# Patient Record
Sex: FEMALE | Race: WHITE | Employment: FULL TIME | URBAN - METROPOLITAN AREA
[De-identification: names, ages, dates, MRNs, and addresses within clinical notes are randomized per-mention and may not be internally consistent; named-entity substitution may affect disease eponyms.]

---

## 2017-01-03 ENCOUNTER — HOSPITAL ENCOUNTER (OUTPATIENT)
Dept: PHYSICAL THERAPY | Age: 57
Discharge: HOME | End: 2017-01-03
Admitting: PHYSICIAN ASSISTANT

## 2017-01-09 ENCOUNTER — OFFICE VISIT (OUTPATIENT)
Dept: ORTHOPEDIC SURGERY | Age: 57
End: 2017-01-09

## 2017-01-09 VITALS
WEIGHT: 205 LBS | SYSTOLIC BLOOD PRESSURE: 117 MMHG | BODY MASS INDEX: 27.77 KG/M2 | TEMPERATURE: 96.7 F | DIASTOLIC BLOOD PRESSURE: 76 MMHG | HEIGHT: 72 IN | HEART RATE: 82 BPM

## 2017-01-09 DIAGNOSIS — M70.62 TROCHANTERIC BURSITIS OF LEFT HIP: Primary | ICD-10-CM

## 2017-01-09 DIAGNOSIS — M17.0 PRIMARY OSTEOARTHRITIS OF BOTH KNEES: ICD-10-CM

## 2017-01-09 PROCEDURE — 20610 DRAIN/INJ JOINT/BURSA W/O US: CPT | Performed by: PHYSICIAN ASSISTANT

## 2017-01-09 PROCEDURE — 99213 OFFICE O/P EST LOW 20 MIN: CPT | Performed by: PHYSICIAN ASSISTANT

## 2017-01-10 ENCOUNTER — HOSPITAL ENCOUNTER (OUTPATIENT)
Dept: PHYSICAL THERAPY | Age: 57
Discharge: HOME | End: 2017-01-10
Admitting: PHYSICIAN ASSISTANT

## 2017-01-11 ENCOUNTER — OFFICE VISIT (OUTPATIENT)
Dept: PRIMARY CARE CLINIC | Age: 57
End: 2017-01-11

## 2017-01-11 VITALS
TEMPERATURE: 98 F | HEART RATE: 81 BPM | WEIGHT: 199 LBS | BODY MASS INDEX: 26.99 KG/M2 | SYSTOLIC BLOOD PRESSURE: 132 MMHG | RESPIRATION RATE: 18 BRPM | OXYGEN SATURATION: 98 % | DIASTOLIC BLOOD PRESSURE: 84 MMHG

## 2017-01-11 DIAGNOSIS — Z83.3 FAMILY HISTORY OF TYPE 2 DIABETES MELLITUS IN FATHER: ICD-10-CM

## 2017-01-11 DIAGNOSIS — Z23 NEED FOR STREPTOCOCCUS PNEUMONIAE VACCINATION: ICD-10-CM

## 2017-01-11 DIAGNOSIS — Z76.89 ENCOUNTER TO ESTABLISH CARE: ICD-10-CM

## 2017-01-11 DIAGNOSIS — C44.90 SKIN CANCER: ICD-10-CM

## 2017-01-11 DIAGNOSIS — E78.00 HYPERCHOLESTEROLEMIA: ICD-10-CM

## 2017-01-11 DIAGNOSIS — G56.03 BILATERAL CARPAL TUNNEL SYNDROME: ICD-10-CM

## 2017-01-11 DIAGNOSIS — M54.32 BILATERAL SCIATICA: Primary | ICD-10-CM

## 2017-01-11 DIAGNOSIS — K21.9 GASTROESOPHAGEAL REFLUX DISEASE WITHOUT ESOPHAGITIS: ICD-10-CM

## 2017-01-11 DIAGNOSIS — M54.31 BILATERAL SCIATICA: Primary | ICD-10-CM

## 2017-01-11 DIAGNOSIS — Z11.59 NEED FOR HEPATITIS C SCREENING TEST: ICD-10-CM

## 2017-01-11 DIAGNOSIS — M81.0 OSTEOPOROSIS: ICD-10-CM

## 2017-01-11 DIAGNOSIS — M79.672 PAIN IN BOTH FEET: ICD-10-CM

## 2017-01-11 DIAGNOSIS — M79.671 PAIN IN BOTH FEET: ICD-10-CM

## 2017-01-11 DIAGNOSIS — E89.0 H/O PARTIAL THYROIDECTOMY: ICD-10-CM

## 2017-01-11 PROCEDURE — 99215 OFFICE O/P EST HI 40 MIN: CPT | Performed by: INTERNAL MEDICINE

## 2017-01-11 PROCEDURE — 90471 IMMUNIZATION ADMIN: CPT | Performed by: INTERNAL MEDICINE

## 2017-01-11 PROCEDURE — 90732 PPSV23 VACC 2 YRS+ SUBQ/IM: CPT | Performed by: INTERNAL MEDICINE

## 2017-01-11 RX ORDER — PRAVASTATIN SODIUM 40 MG
TABLET ORAL
Qty: 90 TABLET | Refills: 3 | Status: SHIPPED | OUTPATIENT
Start: 2017-01-11 | End: 2017-01-20

## 2017-01-11 RX ORDER — ALENDRONATE SODIUM 70 MG/1
TABLET ORAL
Qty: 12 TABLET | Refills: 3 | Status: SHIPPED | OUTPATIENT
Start: 2017-01-11 | End: 2017-01-20

## 2017-01-11 RX ORDER — RANITIDINE 150 MG/1
150 TABLET ORAL NIGHTLY
Qty: 180 TABLET | Refills: 3 | Status: SHIPPED | OUTPATIENT
Start: 2017-01-11 | End: 2017-04-06

## 2017-01-11 ASSESSMENT — ENCOUNTER SYMPTOMS
SINUS PRESSURE: 1
PHOTOPHOBIA: 0
NAUSEA: 1
VOMITING: 0
ABDOMINAL PAIN: 0
CONSTIPATION: 1
VOICE CHANGE: 0
SORE THROAT: 0
EYE ITCHING: 0
EYE DISCHARGE: 0
ANAL BLEEDING: 0
RHINORRHEA: 1
STRIDOR: 0
APNEA: 1
WHEEZING: 0
BACK PAIN: 1
BLOOD IN STOOL: 0
CHOKING: 1
ABDOMINAL DISTENTION: 0
TROUBLE SWALLOWING: 0
COUGH: 0
RECTAL PAIN: 0
CHEST TIGHTNESS: 0
EYE REDNESS: 0
EYE PAIN: 0
DIARRHEA: 0

## 2017-01-12 LAB
A/G RATIO: 1.6 (ref 1.1–2.2)
ALBUMIN SERPL-MCNC: 4.1 G/DL (ref 3.4–5)
ALP BLD-CCNC: 91 U/L (ref 40–129)
ALT SERPL-CCNC: 17 U/L (ref 10–40)
ANION GAP SERPL CALCULATED.3IONS-SCNC: 14 MMOL/L (ref 3–16)
AST SERPL-CCNC: 15 U/L (ref 15–37)
BASOPHILS ABSOLUTE: 0 K/UL (ref 0–0.2)
BASOPHILS RELATIVE PERCENT: 0.5 %
BILIRUB SERPL-MCNC: 0.4 MG/DL (ref 0–1)
BILIRUBIN URINE: NEGATIVE
BLOOD, URINE: NEGATIVE
BUN BLDV-MCNC: 30 MG/DL (ref 7–20)
CALCIUM SERPL-MCNC: 9.3 MG/DL (ref 8.3–10.6)
CHLORIDE BLD-SCNC: 104 MMOL/L (ref 99–110)
CHOLESTEROL, TOTAL: 171 MG/DL (ref 0–199)
CLARITY: CLEAR
CO2: 26 MMOL/L (ref 21–32)
COLOR: YELLOW
COMMENT UA: ABNORMAL
CREAT SERPL-MCNC: 0.8 MG/DL (ref 0.6–1.1)
EOSINOPHILS ABSOLUTE: 0 K/UL (ref 0–0.6)
EOSINOPHILS RELATIVE PERCENT: 0.6 %
EPITHELIAL CELLS, UA: 5 /HPF (ref 0–5)
GFR AFRICAN AMERICAN: >60
GFR NON-AFRICAN AMERICAN: >60
GLOBULIN: 2.6 G/DL
GLUCOSE BLD-MCNC: 89 MG/DL (ref 70–99)
GLUCOSE URINE: NEGATIVE MG/DL
HCT VFR BLD CALC: 41.8 % (ref 36–48)
HDLC SERPL-MCNC: 73 MG/DL (ref 40–60)
HEMOGLOBIN: 13.6 G/DL (ref 12–16)
HEPATITIS C ANTIBODY INTERPRETATION: NORMAL
HYALINE CASTS: 6 /HPF (ref 0–8)
KETONES, URINE: NEGATIVE MG/DL
LDL CHOLESTEROL CALCULATED: 85 MG/DL
LEUKOCYTE ESTERASE, URINE: ABNORMAL
LYMPHOCYTES ABSOLUTE: 2.1 K/UL (ref 1–5.1)
LYMPHOCYTES RELATIVE PERCENT: 26.4 %
MCH RBC QN AUTO: 31.2 PG (ref 26–34)
MCHC RBC AUTO-ENTMCNC: 32.6 G/DL (ref 31–36)
MCV RBC AUTO: 95.6 FL (ref 80–100)
MICROSCOPIC EXAMINATION: YES
MONOCYTES ABSOLUTE: 0.5 K/UL (ref 0–1.3)
MONOCYTES RELATIVE PERCENT: 6.1 %
NEUTROPHILS ABSOLUTE: 5.3 K/UL (ref 1.7–7.7)
NEUTROPHILS RELATIVE PERCENT: 66.4 %
NITRITE, URINE: NEGATIVE
PDW BLD-RTO: 15.5 % (ref 12.4–15.4)
PH UA: 7
PLATELET # BLD: 346 K/UL (ref 135–450)
PMV BLD AUTO: 8.3 FL (ref 5–10.5)
POTASSIUM SERPL-SCNC: 4.9 MMOL/L (ref 3.5–5.1)
PROTEIN UA: NEGATIVE MG/DL
RBC # BLD: 4.37 M/UL (ref 4–5.2)
RBC UA: 6 /HPF (ref 0–4)
SODIUM BLD-SCNC: 144 MMOL/L (ref 136–145)
SPECIFIC GRAVITY UA: 1.02
TOTAL PROTEIN: 6.7 G/DL (ref 6.4–8.2)
TRIGL SERPL-MCNC: 66 MG/DL (ref 0–150)
TSH REFLEX FT4: 2.42 UIU/ML (ref 0.27–4.2)
URINE TYPE: ABNORMAL
UROBILINOGEN, URINE: 0.2 E.U./DL
VITAMIN B-12: 790 PG/ML (ref 211–911)
VITAMIN D 25-HYDROXY: 20.3 NG/ML
VLDLC SERPL CALC-MCNC: 13 MG/DL
WBC # BLD: 8 K/UL (ref 4–11)
WBC UA: 11 /HPF (ref 0–5)

## 2017-01-13 DIAGNOSIS — E55.9 VITAMIN D DEFICIENCY: Primary | ICD-10-CM

## 2017-01-13 LAB
ESTIMATED AVERAGE GLUCOSE: 105.4 MG/DL
HBA1C MFR BLD: 5.3 %

## 2017-01-13 RX ORDER — ERGOCALCIFEROL 1.25 MG/1
50000 CAPSULE ORAL WEEKLY
Qty: 4 CAPSULE | Refills: 5 | Status: SHIPPED | OUTPATIENT
Start: 2017-01-13 | End: 2017-01-14 | Stop reason: SDUPTHER

## 2017-01-14 DIAGNOSIS — E55.9 VITAMIN D DEFICIENCY: ICD-10-CM

## 2017-01-14 DIAGNOSIS — M79.672 PAIN IN BOTH FEET: ICD-10-CM

## 2017-01-14 DIAGNOSIS — R31.29 MICROSCOPIC HEMATURIA: ICD-10-CM

## 2017-01-14 DIAGNOSIS — M79.671 PAIN IN BOTH FEET: ICD-10-CM

## 2017-01-14 DIAGNOSIS — R82.81 PYURIA: Primary | ICD-10-CM

## 2017-01-14 RX ORDER — ERGOCALCIFEROL 1.25 MG/1
50000 CAPSULE ORAL WEEKLY
Qty: 4 CAPSULE | Refills: 5 | Status: SHIPPED | OUTPATIENT
Start: 2017-01-14 | End: 2017-02-08 | Stop reason: SDUPTHER

## 2017-01-14 RX ORDER — GABAPENTIN 300 MG/1
300 CAPSULE ORAL 3 TIMES DAILY
Qty: 90 CAPSULE | Refills: 3 | Status: SHIPPED | OUTPATIENT
Start: 2017-01-14 | End: 2017-01-20

## 2017-01-17 ENCOUNTER — PROCEDURE VISIT (OUTPATIENT)
Dept: NEUROLOGY | Age: 57
End: 2017-01-17

## 2017-01-17 DIAGNOSIS — R20.2 NUMBNESS AND TINGLING: Primary | ICD-10-CM

## 2017-01-17 DIAGNOSIS — R20.0 NUMBNESS AND TINGLING: Primary | ICD-10-CM

## 2017-01-17 PROCEDURE — 95909 NRV CNDJ TST 5-6 STUDIES: CPT | Performed by: PSYCHIATRY & NEUROLOGY

## 2017-01-17 PROCEDURE — 95886 MUSC TEST DONE W/N TEST COMP: CPT | Performed by: PSYCHIATRY & NEUROLOGY

## 2017-01-18 ENCOUNTER — HOSPITAL ENCOUNTER (OUTPATIENT)
Dept: PHYSICAL THERAPY | Age: 57
Discharge: HOME | End: 2017-01-18
Admitting: PHYSICIAN ASSISTANT

## 2017-01-20 ENCOUNTER — TELEPHONE (OUTPATIENT)
Dept: PRIMARY CARE CLINIC | Age: 57
End: 2017-01-20

## 2017-01-20 ENCOUNTER — HOSPITAL ENCOUNTER (OUTPATIENT)
Dept: CT IMAGING | Age: 57
Discharge: HOME | End: 2017-01-20
Attending: INTERNAL MEDICINE | Admitting: INTERNAL MEDICINE

## 2017-01-20 DIAGNOSIS — M79.605 PAIN IN BOTH LOWER EXTREMITIES: Primary | ICD-10-CM

## 2017-01-20 DIAGNOSIS — M79.671 PAIN IN BOTH FEET: ICD-10-CM

## 2017-01-20 DIAGNOSIS — M79.604 PAIN IN BOTH LOWER EXTREMITIES: Primary | ICD-10-CM

## 2017-01-20 DIAGNOSIS — R31.29 OTHER MICROSCOPIC HEMATURIA: ICD-10-CM

## 2017-01-20 DIAGNOSIS — R31.29 MICROSCOPIC HEMATURIA: ICD-10-CM

## 2017-01-20 DIAGNOSIS — R52 PAIN: ICD-10-CM

## 2017-01-20 DIAGNOSIS — M54.31 BILATERAL SCIATICA: ICD-10-CM

## 2017-01-20 DIAGNOSIS — E78.00 HYPERCHOLESTEROLEMIA: ICD-10-CM

## 2017-01-20 DIAGNOSIS — M54.32 BILATERAL SCIATICA: ICD-10-CM

## 2017-01-20 DIAGNOSIS — M79.672 PAIN IN BOTH FEET: ICD-10-CM

## 2017-01-20 LAB
C-REACTIVE PROTEIN: 0.7 MG/L (ref 0–5.1)
SEDIMENTATION RATE, ERYTHROCYTE: 30 MM/HR (ref 0–30)

## 2017-01-20 RX ORDER — LIDOCAINE 50 MG/G
1 PATCH TOPICAL DAILY
Qty: 30 PATCH | Refills: 2 | Status: SHIPPED | OUTPATIENT
Start: 2017-01-20 | End: 2017-02-08

## 2017-01-21 LAB — URINE CULTURE, ROUTINE: NORMAL

## 2017-01-24 ENCOUNTER — OFFICE VISIT (OUTPATIENT)
Dept: RHEUMATOLOGY | Age: 57
End: 2017-01-24

## 2017-01-24 VITALS
DIASTOLIC BLOOD PRESSURE: 82 MMHG | HEART RATE: 66 BPM | BODY MASS INDEX: 27.12 KG/M2 | WEIGHT: 200 LBS | SYSTOLIC BLOOD PRESSURE: 124 MMHG

## 2017-01-24 DIAGNOSIS — M79.7 FIBROMYALGIA: Primary | ICD-10-CM

## 2017-01-24 DIAGNOSIS — Z79.899 ENCOUNTER FOR LONG-TERM (CURRENT) USE OF MEDICATIONS: ICD-10-CM

## 2017-01-24 LAB
ALBUMIN SERPL-MCNC: 4.3 G/DL (ref 3.4–5)
ALP BLD-CCNC: 94 U/L (ref 40–129)
ALT SERPL-CCNC: 20 U/L (ref 10–40)
AST SERPL-CCNC: 19 U/L (ref 15–37)
BILIRUB SERPL-MCNC: 0.5 MG/DL (ref 0–1)
BILIRUBIN DIRECT: <0.2 MG/DL (ref 0–0.3)
BILIRUBIN, INDIRECT: NORMAL MG/DL (ref 0–1)
CREAT SERPL-MCNC: 0.8 MG/DL (ref 0.6–1.1)
GFR AFRICAN AMERICAN: >60
GFR NON-AFRICAN AMERICAN: >60
TOTAL PROTEIN: 6.9 G/DL (ref 6.4–8.2)

## 2017-01-24 PROCEDURE — 99213 OFFICE O/P EST LOW 20 MIN: CPT | Performed by: INTERNAL MEDICINE

## 2017-01-24 RX ORDER — VITAMIN B COMPLEX
1 CAPSULE ORAL DAILY
COMMUNITY

## 2017-01-24 RX ORDER — DULOXETIN HYDROCHLORIDE 30 MG/1
30 CAPSULE, DELAYED RELEASE ORAL DAILY
Qty: 30 CAPSULE | Refills: 2 | Status: SHIPPED | OUTPATIENT
Start: 2017-01-24 | End: 2017-02-08 | Stop reason: SDUPTHER

## 2017-01-24 RX ORDER — MELOXICAM 15 MG/1
15 TABLET ORAL DAILY
Qty: 30 TABLET | Refills: 3 | Status: SHIPPED | OUTPATIENT
Start: 2017-01-24 | End: 2017-04-06

## 2017-01-24 RX ORDER — DULOXETIN HYDROCHLORIDE 30 MG/1
30 CAPSULE, DELAYED RELEASE ORAL DAILY
COMMUNITY
End: 2017-01-24 | Stop reason: SDUPTHER

## 2017-01-31 ENCOUNTER — HOSPITAL ENCOUNTER (OUTPATIENT)
Dept: PHYSICAL THERAPY | Age: 57
Discharge: HOME | End: 2017-01-31
Admitting: PHYSICIAN ASSISTANT

## 2017-02-07 DIAGNOSIS — R31.29 MICROSCOPIC HEMATURIA: Primary | ICD-10-CM

## 2017-02-08 ENCOUNTER — OFFICE VISIT (OUTPATIENT)
Dept: PRIMARY CARE CLINIC | Age: 57
End: 2017-02-08

## 2017-02-08 VITALS
DIASTOLIC BLOOD PRESSURE: 85 MMHG | BODY MASS INDEX: 26.58 KG/M2 | SYSTOLIC BLOOD PRESSURE: 123 MMHG | TEMPERATURE: 97.5 F | HEART RATE: 77 BPM | WEIGHT: 196 LBS

## 2017-02-08 DIAGNOSIS — I73.00 RAYNAUD'S DISEASE WITHOUT GANGRENE: Primary | ICD-10-CM

## 2017-02-08 DIAGNOSIS — M79.7 FIBROMYALGIA: ICD-10-CM

## 2017-02-08 DIAGNOSIS — J02.9 PHARYNGITIS, UNSPECIFIED ETIOLOGY: ICD-10-CM

## 2017-02-08 DIAGNOSIS — E78.00 HYPERCHOLESTEROLEMIA: ICD-10-CM

## 2017-02-08 DIAGNOSIS — Z79.899 ENCOUNTER FOR LONG-TERM (CURRENT) USE OF MEDICATIONS: ICD-10-CM

## 2017-02-08 DIAGNOSIS — E55.9 VITAMIN D DEFICIENCY: ICD-10-CM

## 2017-02-08 LAB — TOTAL CK: 29 U/L (ref 26–192)

## 2017-02-08 PROCEDURE — 99214 OFFICE O/P EST MOD 30 MIN: CPT | Performed by: INTERNAL MEDICINE

## 2017-02-08 RX ORDER — PRAVASTATIN SODIUM 40 MG
TABLET ORAL
Qty: 90 TABLET | Refills: 3
Start: 2017-02-08 | End: 2018-01-15 | Stop reason: SDUPTHER

## 2017-02-08 RX ORDER — ERGOCALCIFEROL 1.25 MG/1
50000 CAPSULE ORAL WEEKLY
Qty: 4 CAPSULE | Refills: 5 | Status: SHIPPED | OUTPATIENT
Start: 2017-02-08 | End: 2017-12-15 | Stop reason: SDUPTHER

## 2017-02-08 RX ORDER — AZITHROMYCIN 250 MG/1
TABLET, FILM COATED ORAL
Qty: 1 PACKET | Refills: 0 | Status: SHIPPED | OUTPATIENT
Start: 2017-02-08 | End: 2017-02-18

## 2017-02-08 RX ORDER — AMLODIPINE BESYLATE 2.5 MG/1
2.5 TABLET ORAL DAILY
Qty: 30 TABLET | Refills: 3 | Status: SHIPPED | OUTPATIENT
Start: 2017-02-08 | End: 2017-06-07 | Stop reason: SDUPTHER

## 2017-02-08 RX ORDER — DULOXETINE 40 MG/1
30 CAPSULE, DELAYED RELEASE ORAL DAILY
Qty: 30 CAPSULE | Refills: 5 | Status: SHIPPED | OUTPATIENT
Start: 2017-02-08 | End: 2017-03-08

## 2017-02-08 ASSESSMENT — ENCOUNTER SYMPTOMS
EYE PAIN: 0
COUGH: 0
ANAL BLEEDING: 0
TROUBLE SWALLOWING: 0
NAUSEA: 0
ABDOMINAL DISTENTION: 0
SORE THROAT: 1
WHEEZING: 0
EYE ITCHING: 0
RECTAL PAIN: 0
SINUS PRESSURE: 0
STRIDOR: 0
ABDOMINAL PAIN: 0
BLOOD IN STOOL: 0
APNEA: 1
BACK PAIN: 1
DIARRHEA: 0
EYE REDNESS: 0
CHOKING: 0
CONSTIPATION: 0
PHOTOPHOBIA: 0
EYE DISCHARGE: 0
RHINORRHEA: 0
VOMITING: 0
VOICE CHANGE: 0
CHEST TIGHTNESS: 0

## 2017-02-08 ASSESSMENT — PATIENT HEALTH QUESTIONNAIRE - PHQ9
SUM OF ALL RESPONSES TO PHQ9 QUESTIONS 1 & 2: 0
1. LITTLE INTEREST OR PLEASURE IN DOING THINGS: 0
SUM OF ALL RESPONSES TO PHQ QUESTIONS 1-9: 0
2. FEELING DOWN, DEPRESSED OR HOPELESS: 0

## 2017-02-27 ENCOUNTER — OFFICE VISIT (OUTPATIENT)
Dept: PRIMARY CARE CLINIC | Age: 57
End: 2017-02-27

## 2017-02-27 VITALS
OXYGEN SATURATION: 97 % | TEMPERATURE: 97.4 F | BODY MASS INDEX: 26.5 KG/M2 | HEART RATE: 76 BPM | SYSTOLIC BLOOD PRESSURE: 123 MMHG | WEIGHT: 195.4 LBS | DIASTOLIC BLOOD PRESSURE: 81 MMHG

## 2017-02-27 DIAGNOSIS — H92.03 OTALGIA, BILATERAL: Primary | ICD-10-CM

## 2017-02-27 PROCEDURE — 99214 OFFICE O/P EST MOD 30 MIN: CPT | Performed by: FAMILY MEDICINE

## 2017-02-27 RX ORDER — AMOXICILLIN 500 MG/1
TABLET, FILM COATED ORAL
Qty: 30 TABLET | Refills: 0 | Status: SHIPPED | OUTPATIENT
Start: 2017-02-27 | End: 2017-04-06

## 2017-02-27 ASSESSMENT — ENCOUNTER SYMPTOMS
CHOKING: 0
DIARRHEA: 0
SINUS PRESSURE: 0
VOMITING: 0
BLOOD IN STOOL: 0
EYE REDNESS: 0
ABDOMINAL DISTENTION: 0
STRIDOR: 0
COUGH: 0
SHORTNESS OF BREATH: 0
PHOTOPHOBIA: 0
APNEA: 0
WHEEZING: 0
NAUSEA: 0
CONSTIPATION: 0
SORE THROAT: 0
RECTAL PAIN: 0
EYE DISCHARGE: 0
RHINORRHEA: 0
EYE PAIN: 0
EYE ITCHING: 0
COLOR CHANGE: 0
TROUBLE SWALLOWING: 0
CHEST TIGHTNESS: 0
BACK PAIN: 0

## 2017-03-08 ENCOUNTER — OFFICE VISIT (OUTPATIENT)
Dept: PRIMARY CARE CLINIC | Age: 57
End: 2017-03-08

## 2017-03-08 VITALS
RESPIRATION RATE: 18 BRPM | TEMPERATURE: 98.5 F | OXYGEN SATURATION: 94 % | WEIGHT: 194 LBS | DIASTOLIC BLOOD PRESSURE: 72 MMHG | SYSTOLIC BLOOD PRESSURE: 113 MMHG | HEART RATE: 89 BPM | BODY MASS INDEX: 26.31 KG/M2

## 2017-03-08 DIAGNOSIS — M79.7 FIBROMYALGIA: ICD-10-CM

## 2017-03-08 DIAGNOSIS — R49.0 HOARSENESS: ICD-10-CM

## 2017-03-08 DIAGNOSIS — M79.2 NEUROPATHIC PAIN: Primary | ICD-10-CM

## 2017-03-08 DIAGNOSIS — Z79.899 ENCOUNTER FOR LONG-TERM (CURRENT) USE OF MEDICATIONS: ICD-10-CM

## 2017-03-08 DIAGNOSIS — R13.14 PHARYNGOESOPHAGEAL DYSPHAGIA: ICD-10-CM

## 2017-03-08 DIAGNOSIS — B37.0 THRUSH: ICD-10-CM

## 2017-03-08 PROCEDURE — 99214 OFFICE O/P EST MOD 30 MIN: CPT | Performed by: INTERNAL MEDICINE

## 2017-03-08 RX ORDER — DULOXETIN HYDROCHLORIDE 60 MG/1
60 CAPSULE, DELAYED RELEASE ORAL DAILY
Qty: 30 CAPSULE | Refills: 3 | Status: SHIPPED | OUTPATIENT
Start: 2017-03-08 | End: 2017-07-29 | Stop reason: SDUPTHER

## 2017-03-08 ASSESSMENT — ENCOUNTER SYMPTOMS
APNEA: 1
NAUSEA: 0
EYE ITCHING: 0
TROUBLE SWALLOWING: 0
ABDOMINAL DISTENTION: 0
EYE DISCHARGE: 0
VOMITING: 0
VOICE CHANGE: 0
SORE THROAT: 1
CHOKING: 0
STRIDOR: 0
CHEST TIGHTNESS: 0
RECTAL PAIN: 0
PHOTOPHOBIA: 0
SINUS PRESSURE: 0
ANAL BLEEDING: 0
EYE REDNESS: 0
WHEEZING: 0
ABDOMINAL PAIN: 0
COUGH: 0
DIARRHEA: 0
BACK PAIN: 1
CONSTIPATION: 0
BLOOD IN STOOL: 0
EYE PAIN: 0
RHINORRHEA: 0

## 2017-03-08 ASSESSMENT — PATIENT HEALTH QUESTIONNAIRE - PHQ9
2. FEELING DOWN, DEPRESSED OR HOPELESS: 0
1. LITTLE INTEREST OR PLEASURE IN DOING THINGS: 0
SUM OF ALL RESPONSES TO PHQ QUESTIONS 1-9: 0
SUM OF ALL RESPONSES TO PHQ9 QUESTIONS 1 & 2: 0

## 2017-03-20 ENCOUNTER — HOSPITAL ENCOUNTER (OUTPATIENT)
Dept: ULTRASOUND IMAGING | Age: 57
Discharge: HOME | End: 2017-03-20
Attending: INTERNAL MEDICINE | Admitting: INTERNAL MEDICINE

## 2017-03-20 DIAGNOSIS — M79.7 FIBROMYALGIA: ICD-10-CM

## 2017-04-03 ENCOUNTER — INITIAL CONSULT (OUTPATIENT)
Dept: SURGERY | Age: 57
End: 2017-04-03

## 2017-04-03 VITALS
SYSTOLIC BLOOD PRESSURE: 123 MMHG | BODY MASS INDEX: 26.28 KG/M2 | HEIGHT: 72 IN | DIASTOLIC BLOOD PRESSURE: 79 MMHG | WEIGHT: 194 LBS | HEART RATE: 80 BPM

## 2017-04-03 DIAGNOSIS — R10.11 RUQ PAIN: Primary | ICD-10-CM

## 2017-04-03 PROCEDURE — 99204 OFFICE O/P NEW MOD 45 MIN: CPT | Performed by: SURGERY

## 2017-04-03 RX ORDER — PREDNISONE 1 MG/1
2.5 TABLET ORAL DAILY
COMMUNITY
End: 2017-04-13 | Stop reason: ALTCHOICE

## 2017-04-03 ASSESSMENT — ENCOUNTER SYMPTOMS
NAUSEA: 1
ABDOMINAL DISTENTION: 1
ABDOMINAL PAIN: 1

## 2017-04-06 ENCOUNTER — OFFICE VISIT (OUTPATIENT)
Dept: PRIMARY CARE CLINIC | Age: 57
End: 2017-04-06

## 2017-04-06 VITALS
HEART RATE: 77 BPM | OXYGEN SATURATION: 98 % | WEIGHT: 192 LBS | HEIGHT: 72 IN | TEMPERATURE: 98.2 F | BODY MASS INDEX: 26.01 KG/M2 | SYSTOLIC BLOOD PRESSURE: 123 MMHG | DIASTOLIC BLOOD PRESSURE: 80 MMHG | RESPIRATION RATE: 16 BRPM

## 2017-04-06 DIAGNOSIS — R49.0 HOARSENESS: ICD-10-CM

## 2017-04-06 DIAGNOSIS — R10.11 RUQ PAIN: Primary | ICD-10-CM

## 2017-04-06 DIAGNOSIS — R11.0 CHRONIC NAUSEA: ICD-10-CM

## 2017-04-06 DIAGNOSIS — B37.0 THRUSH: ICD-10-CM

## 2017-04-06 PROCEDURE — 99213 OFFICE O/P EST LOW 20 MIN: CPT | Performed by: INTERNAL MEDICINE

## 2017-04-06 RX ORDER — MELOXICAM 15 MG/1
15 TABLET ORAL DAILY
COMMUNITY
Start: 2017-01-24 | End: 2017-04-06

## 2017-04-06 RX ORDER — HYDROCODONE BITARTRATE AND ACETAMINOPHEN 5; 325 MG/1; MG/1
1 TABLET ORAL EVERY 6 HOURS PRN
Qty: 10 TABLET | Refills: 0 | Status: SHIPPED | OUTPATIENT
Start: 2017-04-06 | End: 2017-04-13

## 2017-04-06 RX ORDER — DULOXETIN HYDROCHLORIDE 20 MG/1
60 CAPSULE, DELAYED RELEASE ORAL
COMMUNITY
Start: 2017-02-13 | End: 2017-04-06

## 2017-04-06 RX ORDER — PANTOPRAZOLE SODIUM 40 MG/1
40 TABLET, DELAYED RELEASE ORAL DAILY
Qty: 30 TABLET | Refills: 3 | Status: SHIPPED | OUTPATIENT
Start: 2017-04-06 | End: 2017-04-17

## 2017-04-06 ASSESSMENT — ENCOUNTER SYMPTOMS
NAUSEA: 0
CHOKING: 0
ANAL BLEEDING: 0
APNEA: 1
EYE PAIN: 0
EYE DISCHARGE: 0
ABDOMINAL DISTENTION: 0
CONSTIPATION: 0
SINUS PRESSURE: 0
DIARRHEA: 0
RHINORRHEA: 0
SORE THROAT: 0
ABDOMINAL PAIN: 1
RECTAL PAIN: 0
VOICE CHANGE: 0
EYE REDNESS: 0
TROUBLE SWALLOWING: 0
EYE ITCHING: 0
BACK PAIN: 1
COUGH: 0
CHEST TIGHTNESS: 0
VOMITING: 0
PHOTOPHOBIA: 0
STRIDOR: 0
WHEEZING: 0
BLOOD IN STOOL: 0

## 2017-04-06 ASSESSMENT — PATIENT HEALTH QUESTIONNAIRE - PHQ9
SUM OF ALL RESPONSES TO PHQ QUESTIONS 1-9: 0
1. LITTLE INTEREST OR PLEASURE IN DOING THINGS: 0
2. FEELING DOWN, DEPRESSED OR HOPELESS: 0
SUM OF ALL RESPONSES TO PHQ9 QUESTIONS 1 & 2: 0

## 2017-04-07 ENCOUNTER — HOSPITAL ENCOUNTER (OUTPATIENT)
Dept: NUCLEAR MEDICINE | Age: 57
Discharge: HOME | End: 2017-04-07
Attending: SURGERY | Admitting: SURGERY

## 2017-04-07 DIAGNOSIS — R10.11 RUQ PAIN: ICD-10-CM

## 2017-04-07 RX ADMIN — Medication 6 MILLICURIE: at 08:15

## 2017-04-12 ENCOUNTER — TELEPHONE (OUTPATIENT)
Dept: SURGERY | Age: 57
End: 2017-04-12

## 2017-04-13 ENCOUNTER — PAT TELEPHONE (OUTPATIENT)
Dept: PREADMISSION TESTING | Age: 57
End: 2017-04-13

## 2017-04-13 VITALS — WEIGHT: 192 LBS | HEIGHT: 72 IN | BODY MASS INDEX: 26.01 KG/M2

## 2017-04-17 ENCOUNTER — HOSPITAL ENCOUNTER (OUTPATIENT)
Dept: ENDOSCOPY | Age: 57
Discharge: HOME | End: 2017-04-17
Attending: INTERNAL MEDICINE | Admitting: INTERNAL MEDICINE

## 2017-04-17 VITALS
RESPIRATION RATE: 20 BRPM | DIASTOLIC BLOOD PRESSURE: 66 MMHG | OXYGEN SATURATION: 99 % | SYSTOLIC BLOOD PRESSURE: 104 MMHG | HEART RATE: 70 BPM | TEMPERATURE: 96.8 F

## 2017-04-17 DIAGNOSIS — R49.0 HOARSENESS: ICD-10-CM

## 2017-04-17 DIAGNOSIS — R11.0 CHRONIC NAUSEA: ICD-10-CM

## 2017-04-17 DIAGNOSIS — R10.11 RUQ PAIN: ICD-10-CM

## 2017-04-17 RX ORDER — SODIUM CHLORIDE 9 MG/ML
INJECTION, SOLUTION INTRAVENOUS CONTINUOUS
Status: DISCONTINUED | OUTPATIENT
Start: 2017-04-17 | End: 2017-04-18 | Stop reason: HOSPADM

## 2017-04-17 RX ORDER — SUCRALFATE ORAL 1 G/10ML
1 SUSPENSION ORAL 4 TIMES DAILY
Qty: 1200 ML | Refills: 0 | Status: SHIPPED | OUTPATIENT
Start: 2017-04-17 | End: 2017-06-08

## 2017-04-17 RX ORDER — PANTOPRAZOLE SODIUM 40 MG/1
40 TABLET, DELAYED RELEASE ORAL 2 TIMES DAILY
Qty: 60 TABLET | Refills: 3 | Status: SHIPPED | OUTPATIENT
Start: 2017-04-17 | End: 2019-08-07 | Stop reason: ALTCHOICE

## 2017-04-17 RX ORDER — SODIUM CHLORIDE 0.9 % (FLUSH) 0.9 %
10 SYRINGE (ML) INJECTION EVERY 12 HOURS SCHEDULED
Status: DISCONTINUED | OUTPATIENT
Start: 2017-04-17 | End: 2017-04-18 | Stop reason: HOSPADM

## 2017-04-17 RX ORDER — APREPITANT 40 MG/1
40 CAPSULE ORAL ONCE
Status: DISCONTINUED | OUTPATIENT
Start: 2017-04-17 | End: 2017-04-17 | Stop reason: SDUPTHER

## 2017-04-17 RX ORDER — LABETALOL HYDROCHLORIDE 5 MG/ML
5 INJECTION, SOLUTION INTRAVENOUS EVERY 10 MIN PRN
Status: DISCONTINUED | OUTPATIENT
Start: 2017-04-17 | End: 2017-04-18 | Stop reason: HOSPADM

## 2017-04-17 RX ORDER — SODIUM CHLORIDE 0.9 % (FLUSH) 0.9 %
10 SYRINGE (ML) INJECTION PRN
Status: DISCONTINUED | OUTPATIENT
Start: 2017-04-17 | End: 2017-04-18 | Stop reason: HOSPADM

## 2017-04-17 RX ORDER — FENTANYL CITRATE 50 UG/ML
25 INJECTION, SOLUTION INTRAMUSCULAR; INTRAVENOUS EVERY 5 MIN PRN
Status: DISCONTINUED | OUTPATIENT
Start: 2017-04-17 | End: 2017-04-18 | Stop reason: HOSPADM

## 2017-04-17 RX ORDER — APREPITANT 40 MG/1
40 CAPSULE ORAL ONCE
Status: COMPLETED | OUTPATIENT
Start: 2017-04-17 | End: 2017-04-17

## 2017-04-17 RX ORDER — SCOLOPAMINE TRANSDERMAL SYSTEM 1 MG/1
1 PATCH, EXTENDED RELEASE TRANSDERMAL ONCE
Status: DISCONTINUED | OUTPATIENT
Start: 2017-04-17 | End: 2017-04-18 | Stop reason: HOSPADM

## 2017-04-17 RX ORDER — SCOLOPAMINE TRANSDERMAL SYSTEM 1 MG/1
1 PATCH, EXTENDED RELEASE TRANSDERMAL ONCE
Status: DISCONTINUED | OUTPATIENT
Start: 2017-04-17 | End: 2017-04-17 | Stop reason: SDUPTHER

## 2017-04-17 RX ORDER — PROMETHAZINE HYDROCHLORIDE 25 MG/ML
6.25 INJECTION, SOLUTION INTRAMUSCULAR; INTRAVENOUS
Status: ACTIVE | OUTPATIENT
Start: 2017-04-17 | End: 2017-04-17

## 2017-04-17 RX ADMIN — APREPITANT 40 MG: 40 CAPSULE ORAL at 07:40

## 2017-04-17 RX ADMIN — SODIUM CHLORIDE: 9 INJECTION, SOLUTION INTRAVENOUS at 07:40

## 2017-04-17 ASSESSMENT — PAIN - FUNCTIONAL ASSESSMENT: PAIN_FUNCTIONAL_ASSESSMENT: 0-10

## 2017-04-17 ASSESSMENT — PAIN SCALES - GENERAL: PAINLEVEL_OUTOF10: 0

## 2017-05-03 ENCOUNTER — HOSPITAL ENCOUNTER (OUTPATIENT)
Dept: CT IMAGING | Age: 57
Discharge: HOME | End: 2017-05-03
Attending: INTERNAL MEDICINE | Admitting: INTERNAL MEDICINE

## 2017-05-03 DIAGNOSIS — R10.11 RUQ PAIN: ICD-10-CM

## 2017-05-10 ENCOUNTER — HOSPITAL ENCOUNTER (OUTPATIENT)
Dept: OTHER | Age: 57
Discharge: HOME | End: 2017-05-10
Attending: INTERNAL MEDICINE | Admitting: INTERNAL MEDICINE

## 2017-05-10 DIAGNOSIS — R52 PAIN: ICD-10-CM

## 2017-05-15 ENCOUNTER — OFFICE VISIT (OUTPATIENT)
Dept: PRIMARY CARE CLINIC | Age: 57
End: 2017-05-15

## 2017-05-15 VITALS
HEART RATE: 85 BPM | DIASTOLIC BLOOD PRESSURE: 66 MMHG | TEMPERATURE: 98.8 F | OXYGEN SATURATION: 96 % | RESPIRATION RATE: 14 BRPM | BODY MASS INDEX: 25.41 KG/M2 | WEIGHT: 192.6 LBS | SYSTOLIC BLOOD PRESSURE: 98 MMHG

## 2017-05-15 DIAGNOSIS — G62.9 NEUROPATHY: ICD-10-CM

## 2017-05-15 DIAGNOSIS — K25.3 ACUTE GASTRIC ULCER: ICD-10-CM

## 2017-05-15 DIAGNOSIS — M54.14 RADICULAR PAIN OF THORACIC REGION: ICD-10-CM

## 2017-05-15 DIAGNOSIS — R16.1 SPLENOMEGALY: Primary | ICD-10-CM

## 2017-05-15 DIAGNOSIS — R10.2 PELVIC PAIN IN FEMALE: ICD-10-CM

## 2017-05-15 PROBLEM — K25.9 GASTRIC ULCER: Status: ACTIVE | Noted: 2017-05-15

## 2017-05-15 PROCEDURE — 99214 OFFICE O/P EST MOD 30 MIN: CPT | Performed by: INTERNAL MEDICINE

## 2017-05-15 RX ORDER — GABAPENTIN 300 MG/1
300 CAPSULE ORAL 3 TIMES DAILY
Qty: 90 CAPSULE | Refills: 3 | Status: SHIPPED | OUTPATIENT
Start: 2017-05-15 | End: 2019-06-19 | Stop reason: ALTCHOICE

## 2017-05-15 RX ORDER — TRAMADOL HYDROCHLORIDE 50 MG/1
TABLET ORAL
COMMUNITY
Start: 2017-05-11 | End: 2017-06-21

## 2017-05-20 ASSESSMENT — ENCOUNTER SYMPTOMS
RECTAL PAIN: 0
EYE PAIN: 0
CHOKING: 0
BACK PAIN: 1
APNEA: 0
RHINORRHEA: 0
SORE THROAT: 0
EYE DISCHARGE: 0
WHEEZING: 0
ABDOMINAL PAIN: 1
EYE REDNESS: 0
STRIDOR: 0
CHEST TIGHTNESS: 0
VOMITING: 0
PHOTOPHOBIA: 0
EYE ITCHING: 0
DIARRHEA: 0
BLOOD IN STOOL: 0
NAUSEA: 0
ANAL BLEEDING: 0
VOICE CHANGE: 0
TROUBLE SWALLOWING: 0
SINUS PRESSURE: 0
ABDOMINAL DISTENTION: 0
COUGH: 0
CONSTIPATION: 0

## 2017-05-20 ASSESSMENT — PATIENT HEALTH QUESTIONNAIRE - PHQ9
1. LITTLE INTEREST OR PLEASURE IN DOING THINGS: 0
2. FEELING DOWN, DEPRESSED OR HOPELESS: 0
SUM OF ALL RESPONSES TO PHQ QUESTIONS 1-9: 0
SUM OF ALL RESPONSES TO PHQ9 QUESTIONS 1 & 2: 0

## 2017-05-26 ENCOUNTER — TELEPHONE (OUTPATIENT)
Dept: PRIMARY CARE CLINIC | Age: 57
End: 2017-05-26

## 2017-05-26 DIAGNOSIS — M80.0AXA: Primary | ICD-10-CM

## 2017-05-31 ENCOUNTER — OFFICE VISIT (OUTPATIENT)
Dept: ORTHOPEDIC SURGERY | Age: 57
End: 2017-05-31

## 2017-05-31 VITALS
TEMPERATURE: 97.7 F | BODY MASS INDEX: 26.28 KG/M2 | DIASTOLIC BLOOD PRESSURE: 81 MMHG | SYSTOLIC BLOOD PRESSURE: 116 MMHG | HEIGHT: 72 IN | WEIGHT: 194 LBS | HEART RATE: 87 BPM

## 2017-05-31 DIAGNOSIS — M70.62 TROCHANTERIC BURSITIS, LEFT HIP: ICD-10-CM

## 2017-05-31 DIAGNOSIS — M17.0 PRIMARY OSTEOARTHRITIS OF BOTH KNEES: Primary | ICD-10-CM

## 2017-05-31 PROCEDURE — 99212 OFFICE O/P EST SF 10 MIN: CPT | Performed by: PHYSICIAN ASSISTANT

## 2017-05-31 PROCEDURE — 20610 DRAIN/INJ JOINT/BURSA W/O US: CPT | Performed by: PHYSICIAN ASSISTANT

## 2017-06-08 ENCOUNTER — HOSPITAL ENCOUNTER (OUTPATIENT)
Dept: NUCLEAR MEDICINE | Age: 57
Discharge: HOME | End: 2017-06-08
Admitting: INTERNAL MEDICINE

## 2017-06-08 DIAGNOSIS — M80.00XA AGE-RELATED OSTEOPOROSIS WITH CURRENT PATHOLOGICAL FRACTURE: ICD-10-CM

## 2017-06-08 DIAGNOSIS — M80.0AXA: ICD-10-CM

## 2017-06-08 RX ORDER — TC 99M MEDRONATE 20 MG/10ML
25 INJECTION, POWDER, LYOPHILIZED, FOR SOLUTION INTRAVENOUS
Status: COMPLETED | OUTPATIENT
Start: 2017-06-08 | End: 2017-06-08

## 2017-06-08 RX ADMIN — TC 99M MEDRONATE 25 MILLICURIE: 20 INJECTION, POWDER, LYOPHILIZED, FOR SOLUTION INTRAVENOUS at 09:00

## 2017-06-09 DIAGNOSIS — M80.0AXA: ICD-10-CM

## 2017-06-09 LAB
PROTEIN PROTEIN: 0.05 G/DL
PROTEIN PROTEIN: 47 MG/DL

## 2017-06-12 ENCOUNTER — HOSPITAL ENCOUNTER (OUTPATIENT)
Dept: WOMENS IMAGING | Age: 57
Discharge: HOME | End: 2017-06-12
Attending: INTERNAL MEDICINE | Admitting: INTERNAL MEDICINE

## 2017-06-12 DIAGNOSIS — M80.00XA AGE-RELATED OSTEOPOROSIS WITH CURRENT PATHOLOGICAL FRACTURE: ICD-10-CM

## 2017-06-12 DIAGNOSIS — M80.0AXA: ICD-10-CM

## 2017-06-12 LAB
ALBUMIN SERPL-MCNC: 3.8 G/DL (ref 3.1–4.9)
ALPHA-1-GLOBULIN: 0.3 G/DL (ref 0.2–0.4)
ALPHA-2-GLOBULIN: 0.8 G/DL (ref 0.4–1.1)
BETA GLOBULIN: 1.2 G/DL (ref 0.9–1.6)
GAMMA GLOBULIN: 1.3 G/DL (ref 0.6–1.8)
SPE/IFE INTERPRETATION: NORMAL
TOTAL PROTEIN: 7.4 G/DL (ref 6.4–8.2)
URINE ELECTROPHORESIS INTERP: NORMAL

## 2017-06-14 DIAGNOSIS — J45.991 COUGH VARIANT ASTHMA: Primary | ICD-10-CM

## 2017-06-14 RX ORDER — ALBUTEROL SULFATE 90 UG/1
2 AEROSOL, METERED RESPIRATORY (INHALATION) EVERY 6 HOURS PRN
Qty: 1 INHALER | Refills: 3 | Status: SHIPPED | OUTPATIENT
Start: 2017-06-14 | End: 2019-08-07 | Stop reason: ALTCHOICE

## 2017-06-14 RX ORDER — BUDESONIDE AND FORMOTEROL FUMARATE DIHYDRATE 160; 4.5 UG/1; UG/1
2 AEROSOL RESPIRATORY (INHALATION) 2 TIMES DAILY
Qty: 1 INHALER | Refills: 3 | Status: SHIPPED | OUTPATIENT
Start: 2017-06-14 | End: 2019-08-07 | Stop reason: ALTCHOICE

## 2017-06-21 ENCOUNTER — OFFICE VISIT (OUTPATIENT)
Dept: PRIMARY CARE CLINIC | Age: 57
End: 2017-06-21

## 2017-06-21 VITALS
BODY MASS INDEX: 25.63 KG/M2 | WEIGHT: 189 LBS | OXYGEN SATURATION: 98 % | DIASTOLIC BLOOD PRESSURE: 83 MMHG | TEMPERATURE: 98.8 F | SYSTOLIC BLOOD PRESSURE: 121 MMHG | HEART RATE: 80 BPM

## 2017-06-21 DIAGNOSIS — E55.9 VITAMIN D DEFICIENCY: ICD-10-CM

## 2017-06-21 DIAGNOSIS — B37.2 SKIN CANDIDIASIS: ICD-10-CM

## 2017-06-21 DIAGNOSIS — R80.9 PROTEINURIA: ICD-10-CM

## 2017-06-21 DIAGNOSIS — K25.3 ACUTE GASTRIC ULCER: ICD-10-CM

## 2017-06-21 DIAGNOSIS — J45.991 ASTHMA, COUGH VARIANT: ICD-10-CM

## 2017-06-21 DIAGNOSIS — S22.31XA CLOSED FRACTURE OF RIB OF RIGHT SIDE, INITIAL ENCOUNTER: ICD-10-CM

## 2017-06-21 DIAGNOSIS — J20.9 ACUTE BRONCHITIS, UNSPECIFIED ORGANISM: Primary | ICD-10-CM

## 2017-06-21 LAB
ALBUMIN SERPL-MCNC: 4.5 G/DL (ref 3.4–5)
ANION GAP SERPL CALCULATED.3IONS-SCNC: 13 MMOL/L (ref 3–16)
BASOPHILS ABSOLUTE: 0 K/UL (ref 0–0.2)
BASOPHILS RELATIVE PERCENT: 0.6 %
BUN BLDV-MCNC: 14 MG/DL (ref 7–20)
CALCIUM SERPL-MCNC: 9.5 MG/DL (ref 8.3–10.6)
CHLORIDE BLD-SCNC: 104 MMOL/L (ref 99–110)
CO2: 26 MMOL/L (ref 21–32)
CREAT SERPL-MCNC: 0.8 MG/DL (ref 0.6–1.1)
CREATININE URINE: 272.3 MG/DL (ref 28–259)
EOSINOPHILS ABSOLUTE: 0.1 K/UL (ref 0–0.6)
EOSINOPHILS RELATIVE PERCENT: 1.8 %
GFR AFRICAN AMERICAN: >60
GFR NON-AFRICAN AMERICAN: >60
GLUCOSE BLD-MCNC: 81 MG/DL (ref 70–99)
HCT VFR BLD CALC: 43.2 % (ref 36–48)
HEMOGLOBIN: 13.8 G/DL (ref 12–16)
LYMPHOCYTES ABSOLUTE: 2 K/UL (ref 1–5.1)
LYMPHOCYTES RELATIVE PERCENT: 29.6 %
MCH RBC QN AUTO: 31.1 PG (ref 26–34)
MCHC RBC AUTO-ENTMCNC: 31.9 G/DL (ref 31–36)
MCV RBC AUTO: 97.8 FL (ref 80–100)
MICROALBUMIN UR-MCNC: 1.8 MG/DL
MICROALBUMIN/CREAT UR-RTO: 6.6 MG/G (ref 0–30)
MONOCYTES ABSOLUTE: 0.3 K/UL (ref 0–1.3)
MONOCYTES RELATIVE PERCENT: 4.7 %
NEUTROPHILS ABSOLUTE: 4.2 K/UL (ref 1.7–7.7)
NEUTROPHILS RELATIVE PERCENT: 63.3 %
PDW BLD-RTO: 14.8 % (ref 12.4–15.4)
PHOSPHORUS: 3.6 MG/DL (ref 2.5–4.9)
PLATELET # BLD: 260 K/UL (ref 135–450)
PMV BLD AUTO: 8.2 FL (ref 5–10.5)
POTASSIUM SERPL-SCNC: 5.1 MMOL/L (ref 3.5–5.1)
RBC # BLD: 4.42 M/UL (ref 4–5.2)
SODIUM BLD-SCNC: 143 MMOL/L (ref 136–145)
VITAMIN D 25-HYDROXY: 39.5 NG/ML
WBC # BLD: 6.7 K/UL (ref 4–11)

## 2017-06-21 PROCEDURE — 99214 OFFICE O/P EST MOD 30 MIN: CPT | Performed by: INTERNAL MEDICINE

## 2017-06-21 RX ORDER — HYDROCODONE BITARTRATE AND ACETAMINOPHEN 7.5; 3 MG/1; MG/1
1 TABLET ORAL 2 TIMES DAILY PRN
Qty: 60 TABLET | Refills: 0 | Status: SHIPPED | OUTPATIENT
Start: 2017-06-21 | End: 2017-09-26

## 2017-06-21 RX ORDER — DOXYCYCLINE HYCLATE 100 MG
100 TABLET ORAL 2 TIMES DAILY
Qty: 20 TABLET | Refills: 0 | Status: SHIPPED | OUTPATIENT
Start: 2017-06-21 | End: 2017-07-01

## 2017-06-21 RX ORDER — KETOCONAZOLE 20 MG/G
CREAM TOPICAL
Qty: 30 G | Refills: 1 | Status: SHIPPED | OUTPATIENT
Start: 2017-06-21 | End: 2017-11-29 | Stop reason: ALTCHOICE

## 2017-06-21 ASSESSMENT — ENCOUNTER SYMPTOMS
COUGH: 1
PHOTOPHOBIA: 0
APNEA: 0
CHOKING: 0
ABDOMINAL PAIN: 1
VOMITING: 0
EYE ITCHING: 0
ABDOMINAL DISTENTION: 0
EYE DISCHARGE: 0
DIARRHEA: 0
BACK PAIN: 1
BLOOD IN STOOL: 0
RHINORRHEA: 0
SORE THROAT: 0
RECTAL PAIN: 0
STRIDOR: 0
EYE PAIN: 0
VOICE CHANGE: 0
ANAL BLEEDING: 0
NAUSEA: 0
CHEST TIGHTNESS: 0
WHEEZING: 0
TROUBLE SWALLOWING: 0
SINUS PRESSURE: 0
CONSTIPATION: 0
EYE REDNESS: 0

## 2017-07-05 ENCOUNTER — PAT TELEPHONE (OUTPATIENT)
Dept: PREADMISSION TESTING | Age: 57
End: 2017-07-05

## 2017-07-05 VITALS — BODY MASS INDEX: 25.47 KG/M2 | WEIGHT: 188 LBS | HEIGHT: 72 IN

## 2017-07-06 ENCOUNTER — HOSPITAL ENCOUNTER (OUTPATIENT)
Dept: ENDOSCOPY | Age: 57
Discharge: HOME | End: 2017-07-06
Attending: INTERNAL MEDICINE | Admitting: INTERNAL MEDICINE

## 2017-07-06 VITALS
WEIGHT: 188.05 LBS | SYSTOLIC BLOOD PRESSURE: 108 MMHG | DIASTOLIC BLOOD PRESSURE: 74 MMHG | RESPIRATION RATE: 16 BRPM | OXYGEN SATURATION: 98 % | HEART RATE: 71 BPM | BODY MASS INDEX: 24.81 KG/M2 | TEMPERATURE: 97.3 F

## 2017-07-06 RX ORDER — SODIUM CHLORIDE 0.9 % (FLUSH) 0.9 %
10 SYRINGE (ML) INJECTION EVERY 12 HOURS SCHEDULED
Status: DISCONTINUED | OUTPATIENT
Start: 2017-07-06 | End: 2017-07-07 | Stop reason: HOSPADM

## 2017-07-06 RX ORDER — FENTANYL CITRATE 50 UG/ML
50 INJECTION, SOLUTION INTRAMUSCULAR; INTRAVENOUS EVERY 5 MIN PRN
Status: DISCONTINUED | OUTPATIENT
Start: 2017-07-06 | End: 2017-07-07 | Stop reason: HOSPADM

## 2017-07-06 RX ORDER — FENTANYL CITRATE 50 UG/ML
25 INJECTION, SOLUTION INTRAMUSCULAR; INTRAVENOUS EVERY 5 MIN PRN
Status: DISCONTINUED | OUTPATIENT
Start: 2017-07-06 | End: 2017-07-07 | Stop reason: HOSPADM

## 2017-07-06 RX ORDER — SODIUM CHLORIDE 9 MG/ML
INJECTION, SOLUTION INTRAVENOUS CONTINUOUS
Status: DISCONTINUED | OUTPATIENT
Start: 2017-07-06 | End: 2017-07-07 | Stop reason: HOSPADM

## 2017-07-06 RX ORDER — ONDANSETRON 2 MG/ML
4 INJECTION INTRAMUSCULAR; INTRAVENOUS
Status: ACTIVE | OUTPATIENT
Start: 2017-07-06 | End: 2017-07-06

## 2017-07-06 RX ORDER — SODIUM CHLORIDE 0.9 % (FLUSH) 0.9 %
10 SYRINGE (ML) INJECTION PRN
Status: DISCONTINUED | OUTPATIENT
Start: 2017-07-06 | End: 2017-07-07 | Stop reason: HOSPADM

## 2017-07-06 RX ORDER — MEPERIDINE HYDROCHLORIDE 25 MG/ML
12.5 INJECTION INTRAMUSCULAR; INTRAVENOUS; SUBCUTANEOUS EVERY 5 MIN PRN
Status: DISCONTINUED | OUTPATIENT
Start: 2017-07-06 | End: 2017-07-07 | Stop reason: HOSPADM

## 2017-07-06 RX ADMIN — SODIUM CHLORIDE: 9 INJECTION, SOLUTION INTRAVENOUS at 10:36

## 2017-07-06 ASSESSMENT — PAIN SCALES - GENERAL
PAINLEVEL_OUTOF10: 0
PAINLEVEL_OUTOF10: 0

## 2017-07-06 ASSESSMENT — PAIN - FUNCTIONAL ASSESSMENT: PAIN_FUNCTIONAL_ASSESSMENT: 0-10

## 2017-07-17 ENCOUNTER — OFFICE VISIT (OUTPATIENT)
Dept: PRIMARY CARE CLINIC | Age: 57
End: 2017-07-17

## 2017-07-17 ENCOUNTER — HOSPITAL ENCOUNTER (OUTPATIENT)
Dept: OTHER | Age: 57
Discharge: HOME | End: 2017-07-17
Attending: INTERNAL MEDICINE | Admitting: INTERNAL MEDICINE

## 2017-07-17 VITALS
WEIGHT: 188 LBS | DIASTOLIC BLOOD PRESSURE: 77 MMHG | OXYGEN SATURATION: 97 % | SYSTOLIC BLOOD PRESSURE: 114 MMHG | RESPIRATION RATE: 16 BRPM | HEIGHT: 72 IN | BODY MASS INDEX: 25.47 KG/M2 | TEMPERATURE: 98.2 F | HEART RATE: 75 BPM

## 2017-07-17 DIAGNOSIS — R07.81 PAIN IN RIB: ICD-10-CM

## 2017-07-17 DIAGNOSIS — M79.18 GLUTEAL PAIN: Primary | ICD-10-CM

## 2017-07-17 DIAGNOSIS — M79.18 GLUTEAL PAIN: ICD-10-CM

## 2017-07-17 DIAGNOSIS — J45.991 COUGH VARIANT ASTHMA: ICD-10-CM

## 2017-07-17 LAB — TOTAL CK: 31 U/L (ref 26–192)

## 2017-07-17 PROCEDURE — 99213 OFFICE O/P EST LOW 20 MIN: CPT | Performed by: INTERNAL MEDICINE

## 2017-07-17 ASSESSMENT — PATIENT HEALTH QUESTIONNAIRE - PHQ9
2. FEELING DOWN, DEPRESSED OR HOPELESS: 0
SUM OF ALL RESPONSES TO PHQ9 QUESTIONS 1 & 2: 0
1. LITTLE INTEREST OR PLEASURE IN DOING THINGS: 0
SUM OF ALL RESPONSES TO PHQ QUESTIONS 1-9: 0

## 2017-07-19 ENCOUNTER — HOSPITAL ENCOUNTER (OUTPATIENT)
Dept: OTHER | Age: 57
Discharge: HOME | End: 2017-07-19
Attending: INTERNAL MEDICINE | Admitting: INTERNAL MEDICINE

## 2017-07-19 DIAGNOSIS — R07.81 PAIN IN RIB: ICD-10-CM

## 2017-07-20 ENCOUNTER — OFFICE VISIT (OUTPATIENT)
Dept: ORTHOPEDIC SURGERY | Age: 57
End: 2017-07-20

## 2017-07-20 VITALS
HEART RATE: 83 BPM | HEIGHT: 72 IN | DIASTOLIC BLOOD PRESSURE: 84 MMHG | BODY MASS INDEX: 26.28 KG/M2 | WEIGHT: 194 LBS | SYSTOLIC BLOOD PRESSURE: 133 MMHG

## 2017-07-20 DIAGNOSIS — M54.16 LUMBAR RADICULITIS: ICD-10-CM

## 2017-07-20 DIAGNOSIS — M41.9 SCOLIOSIS OF THORACOLUMBAR SPINE, UNSPECIFIED SCOLIOSIS TYPE: Primary | ICD-10-CM

## 2017-07-20 PROCEDURE — 99214 OFFICE O/P EST MOD 30 MIN: CPT | Performed by: NURSE PRACTITIONER

## 2017-07-20 RX ORDER — METHYLPREDNISOLONE 4 MG/1
TABLET ORAL
Qty: 1 KIT | Refills: 0 | Status: SHIPPED | OUTPATIENT
Start: 2017-07-20 | End: 2017-09-26

## 2017-07-24 ASSESSMENT — ENCOUNTER SYMPTOMS
SINUS PRESSURE: 0
CONSTIPATION: 0
APNEA: 0
EYE DISCHARGE: 0
EYE ITCHING: 0
TROUBLE SWALLOWING: 0
ABDOMINAL DISTENTION: 0
STRIDOR: 0
CHEST TIGHTNESS: 0
VOMITING: 0
CHOKING: 0
ANAL BLEEDING: 0
EYE REDNESS: 0
SORE THROAT: 0
COUGH: 0
VOICE CHANGE: 0
EYE PAIN: 0
RECTAL PAIN: 0
ABDOMINAL PAIN: 0
NAUSEA: 0
WHEEZING: 0
PHOTOPHOBIA: 0
BACK PAIN: 1
BLOOD IN STOOL: 0
RHINORRHEA: 0
DIARRHEA: 0

## 2017-07-29 DIAGNOSIS — M79.2 NEUROPATHIC PAIN: ICD-10-CM

## 2017-07-31 DIAGNOSIS — M79.2 NEUROPATHIC PAIN: ICD-10-CM

## 2017-07-31 RX ORDER — DULOXETIN HYDROCHLORIDE 60 MG/1
CAPSULE, DELAYED RELEASE ORAL
Qty: 30 CAPSULE | Refills: 0 | Status: SHIPPED | OUTPATIENT
Start: 2017-07-31 | End: 2017-07-31 | Stop reason: SDUPTHER

## 2017-08-03 RX ORDER — DULOXETIN HYDROCHLORIDE 60 MG/1
CAPSULE, DELAYED RELEASE ORAL
Qty: 30 CAPSULE | Refills: 2 | Status: SHIPPED | OUTPATIENT
Start: 2017-08-03 | End: 2017-11-27 | Stop reason: SDUPTHER

## 2017-08-23 ENCOUNTER — OFFICE VISIT (OUTPATIENT)
Dept: PRIMARY CARE CLINIC | Age: 57
End: 2017-08-23

## 2017-08-23 VITALS
RESPIRATION RATE: 18 BRPM | DIASTOLIC BLOOD PRESSURE: 76 MMHG | OXYGEN SATURATION: 94 % | TEMPERATURE: 98.5 F | WEIGHT: 194 LBS | HEIGHT: 72 IN | BODY MASS INDEX: 26.28 KG/M2 | SYSTOLIC BLOOD PRESSURE: 115 MMHG | HEART RATE: 82 BPM

## 2017-08-23 DIAGNOSIS — C06.0 SQUAMOUS CELL CANCER OF BUCCAL MUCOSA (HCC): ICD-10-CM

## 2017-08-23 DIAGNOSIS — R49.0 HOARSENESS OF VOICE: ICD-10-CM

## 2017-08-23 DIAGNOSIS — C44.719 BASAL CELL CARCINOMA, LEG, LEFT: ICD-10-CM

## 2017-08-23 DIAGNOSIS — M16.10 ARTHRITIS PAIN OF HIP: ICD-10-CM

## 2017-08-23 DIAGNOSIS — E04.1 LEFT THYROID NODULE: ICD-10-CM

## 2017-08-23 DIAGNOSIS — B35.1 NAIL FUNGUS: Primary | ICD-10-CM

## 2017-08-23 LAB
ALBUMIN SERPL-MCNC: 4.3 G/DL (ref 3.4–5)
ANION GAP SERPL CALCULATED.3IONS-SCNC: 12 MMOL/L (ref 3–16)
BUN BLDV-MCNC: 18 MG/DL (ref 7–20)
C-REACTIVE PROTEIN: 0.9 MG/L (ref 0–5.1)
CALCIUM SERPL-MCNC: 9.5 MG/DL (ref 8.3–10.6)
CHLORIDE BLD-SCNC: 102 MMOL/L (ref 99–110)
CO2: 27 MMOL/L (ref 21–32)
CREAT SERPL-MCNC: 0.8 MG/DL (ref 0.6–1.1)
GFR AFRICAN AMERICAN: >60
GFR NON-AFRICAN AMERICAN: >60
GLUCOSE BLD-MCNC: 81 MG/DL (ref 70–99)
PHOSPHORUS: 3.5 MG/DL (ref 2.5–4.9)
POTASSIUM SERPL-SCNC: 4.8 MMOL/L (ref 3.5–5.1)
SODIUM BLD-SCNC: 141 MMOL/L (ref 136–145)
TSH REFLEX FT4: 1.61 UIU/ML (ref 0.27–4.2)

## 2017-08-23 PROCEDURE — 99214 OFFICE O/P EST MOD 30 MIN: CPT | Performed by: INTERNAL MEDICINE

## 2017-08-23 RX ORDER — TERBINAFINE HYDROCHLORIDE 250 MG/1
250 TABLET ORAL DAILY
Qty: 42 TABLET | Refills: 0 | Status: SHIPPED | OUTPATIENT
Start: 2017-08-23 | End: 2017-09-24 | Stop reason: SDUPTHER

## 2017-08-23 ASSESSMENT — PATIENT HEALTH QUESTIONNAIRE - PHQ9
SUM OF ALL RESPONSES TO PHQ QUESTIONS 1-9: 0
SUM OF ALL RESPONSES TO PHQ9 QUESTIONS 1 & 2: 0
1. LITTLE INTEREST OR PLEASURE IN DOING THINGS: 0
2. FEELING DOWN, DEPRESSED OR HOPELESS: 0

## 2017-08-25 ENCOUNTER — HOSPITAL ENCOUNTER (OUTPATIENT)
Dept: ULTRASOUND IMAGING | Age: 57
Discharge: HOME | End: 2017-08-25
Attending: INTERNAL MEDICINE | Admitting: INTERNAL MEDICINE

## 2017-08-25 DIAGNOSIS — E04.1 NONTOXIC SINGLE THYROID NODULE: ICD-10-CM

## 2017-08-25 DIAGNOSIS — E04.1 LEFT THYROID NODULE: ICD-10-CM

## 2017-08-29 ASSESSMENT — ENCOUNTER SYMPTOMS
EYE ITCHING: 0
RHINORRHEA: 0
VOMITING: 0
COUGH: 0
EYE PAIN: 0
STRIDOR: 0
CHOKING: 0
ABDOMINAL PAIN: 0
ABDOMINAL DISTENTION: 0
RECTAL PAIN: 0
EYE DISCHARGE: 0
PHOTOPHOBIA: 0
ANAL BLEEDING: 0
NAUSEA: 0
SORE THROAT: 0
WHEEZING: 0
VOICE CHANGE: 0
DIARRHEA: 0
EYE REDNESS: 0
CHEST TIGHTNESS: 0
CONSTIPATION: 0
BLOOD IN STOOL: 0
TROUBLE SWALLOWING: 0
APNEA: 0
BACK PAIN: 1
SINUS PRESSURE: 0

## 2017-08-30 DIAGNOSIS — R07.81 RIB PAIN ON RIGHT SIDE: Primary | ICD-10-CM

## 2017-09-06 ENCOUNTER — HOSPITAL ENCOUNTER (OUTPATIENT)
Dept: OTHER | Age: 57
Discharge: HOME | End: 2017-09-06
Attending: INTERNAL MEDICINE | Admitting: INTERNAL MEDICINE

## 2017-09-06 DIAGNOSIS — R07.81 RIB PAIN ON RIGHT SIDE: ICD-10-CM

## 2017-09-07 DIAGNOSIS — M85.80 OSTEOPENIA, UNSPECIFIED LOCATION: Primary | ICD-10-CM

## 2017-09-07 DIAGNOSIS — S22.43XG: ICD-10-CM

## 2017-09-12 ENCOUNTER — TELEPHONE (OUTPATIENT)
Dept: PRIMARY CARE CLINIC | Age: 57
End: 2017-09-12

## 2017-09-24 DIAGNOSIS — B35.1 NAIL FUNGUS: ICD-10-CM

## 2017-09-26 ENCOUNTER — OFFICE VISIT (OUTPATIENT)
Dept: ENT CLINIC | Age: 57
End: 2017-09-26

## 2017-09-26 VITALS
SYSTOLIC BLOOD PRESSURE: 113 MMHG | WEIGHT: 199.1 LBS | HEART RATE: 74 BPM | BODY MASS INDEX: 26.97 KG/M2 | HEIGHT: 72 IN | DIASTOLIC BLOOD PRESSURE: 63 MMHG

## 2017-09-26 DIAGNOSIS — R49.0 HOARSENESS OF VOICE: Primary | ICD-10-CM

## 2017-09-26 DIAGNOSIS — R22.1 MASS OF LEFT SIDE OF NECK: ICD-10-CM

## 2017-09-26 PROCEDURE — 99243 OFF/OP CNSLTJ NEW/EST LOW 30: CPT | Performed by: OTOLARYNGOLOGY

## 2017-09-26 RX ORDER — AMOXICILLIN AND CLAVULANATE POTASSIUM 875; 125 MG/1; MG/1
1 TABLET, FILM COATED ORAL 2 TIMES DAILY
COMMUNITY
End: 2017-10-12 | Stop reason: ALTCHOICE

## 2017-09-26 RX ORDER — TRAMADOL HYDROCHLORIDE 50 MG/1
TABLET ORAL
COMMUNITY
Start: 2017-08-29 | End: 2017-10-23

## 2017-09-26 NOTE — MR AVS SNAPSHOT
After Visit Summary             Haleigh Zapata   2017 4:40 PM   Office Visit    Description:  Female : 1960   Provider:  Rosita Zhang MD   Department:  [facility] Ear Nose Throat              Your Follow-Up and Future Appointments         Below is a list of your follow-up and future appointments. This may not be a complete list as you may have made appointments directly with providers that we are not aware of or your providers may have made some for you. Please call your providers to confirm appointments. It is important to keep your appointments. Please bring your current insurance card, photo ID, co-pay, and all medication bottles to your appointment. If self-pay, payment is expected at the time of service. Your To-Do List     Future Appointments Provider Department Dept Phone    2017 8:45 AM Josiah Kent, [address] [facility] [phone]    Please arrive 15 minutes prior to scheduled appointment time to complete paper work, bring photo ID and insurance cards. 10/5/2017 1:50 PM [facility] [address] [facility] [phone]    10/12/2017 10:00 AM Dylan Lnik, [address] [facility] [phone]    Please arrive 15 minutes prior to scheduled appointment time to complete paper work, bring photo ID and insurance cards. 10/23/2017 9:20 AM Mervat Krueger  [address] [facility] [phone]    Please arrive 15 minutes prior to appointment, bring photo ID and insurance card. Future Orders Complete By Expires    CT Soft Tissue Neck W Contrast [[id number] Custom]  2017 (Approximate) 2018    Follow-Up    Return for flexible fiberoptic nasopharyngolaryngoscopy, one week after CT scan of neck.          Information from Your Visit        Department     Name Address Phone Fax    [facility] Andrew Martinez 14 [facility] 90 [address]  [address] 33 [address] [facility] [phone] albuterol sulfate HFA (VENTOLIN HFA) 108 (90 Base) MCG/ACT inhaler Inhale 2 puffs into the lungs every 6 hours as needed for Wheezing (cough)    amLODIPine (NORVASC) 2.5 MG tablet TAKE ONE TABLET BY MOUTH DAILY    gabapentin (NEURONTIN) 300 MG capsule Take 1 capsule by mouth 3 times daily    pantoprazole (PROTONIX) 40 MG tablet Take 1 tablet by mouth 2 times daily    ondansetron (ZOFRAN) 4 MG tablet Take 1 tablet by mouth every 8 hours as needed for Nausea    pravastatin (PRAVACHOL) 40 MG tablet TAKE ONE TABLET BY MOUTH DAILY    vitamin D (ERGOCALCIFEROL) 17213 UNITS CAPS capsule Take 1 capsule by mouth once a week    b complex vitamins capsule Take 1 capsule by mouth daily    Multiple Vitamins-Minerals (THERAPEUTIC MULTIVITAMIN-MINERALS) tablet Take 1 tablet by mouth daily. Allergies              Keflex [Cephalexin] Nausea And Vomiting    vomiting    Adhesive Tape Other (See Comments)    Red & painful    Morphine Other (See Comments)    Hyper.  Pt refuse    Augmentin [Amoxicillin-pot Clavulanate] Nausea And Vomiting         Additional Information        Basic Information     Date Of Birth Sex Race Ethnicity Preferred Language    1960 Female White Non-/Non  English      Problem List as of 9/26/2017  Date Reviewed: 9/26/2017                Multiple fractures of ribs, bilateral, subsequent encounter for fracture with delayed healing    Cough variant asthma    Primary osteoarthritis of both knees    Trochanteric bursitis, left hip    Splenomegaly    Gastric ulcer    Family history of type 2 diabetes mellitus in father    Skin cancer    Right ankle sprain    Fibula fracture    Chondromalacia of patella    Primary localized osteoarthrosis, lower leg    Trochanteric bursitis of right hip    Right hip pain    Knee pain    Enchondroma of femur    Scoliosis    Rheumatoid arthritis of hand (HCC)    Osteoporosis    Hypercholesterolemia    Sick sinus syndrome (HCC)    Thyroid nodule    Classic migraine

## 2017-09-26 NOTE — PROGRESS NOTES
denies any history of HTN    Bradycardia     Bronchitis     Cancer ([address] 2009    BCC Rt leg,squamous cell    Classic migraine     Degenerated intervertebral disc     GERD (gastroesophageal reflux disease)     Hx of blood clots     neck,arm after first pacemaker inserted    Hypercholesterolemia     IBS (irritable bowel syndrome)     Nausea & vomiting     Neuropathy (HCC)     Osteoporosis     Peripheral vascular disease ([address]     PONV (postoperative nausea and vomiting)     family hx also of post op n/v    Rheumatoid arthritis of the hand     left thumb MCP joint , left great toe MTP joint    Seizure ([address]     as a child due to hole in heart-last one [address] sinus syndrome [facility])     s/p pacemaker 2008    Sleep apnea     bi-pap    Thyroid nodule     benign s/p surgery       Past Surgical History:   Procedure Laterality Date    CARDIAC SURGERY  1/1955    Congenital heart defect    KNEE ARTHROSCOPY Right     torn meniscous    KNEE SURGERY Left 2010   [address]   [address] [facility] & 2007    Placement under stomach muscle    PACEMAKER PLACEMENT  2016    THYROIDECTOMY, PARTIAL Right 2011    TONSILLECTOMY AND ADENOIDECTOMY  1978    TYMPANOSTOMY TUBE PLACEMENT      UPPER GASTROINTESTINAL ENDOSCOPY  04/17/2017    UPPER GASTROINTESTINAL ENDOSCOPY  07/06/2017         EXAMINATION:    VITALS SIGNS reviewed. Vitals:    09/26/17 1618   BP: 113/63   Site: Left Arm   Position: Sitting   Cuff Size: Medium Adult   Pulse: 74   Weight: 199 lb 1.6 oz (90.3 kg)   Height: 6' 1\" (1.854 m)     GENERAL. APPEARANCE:  Well developed, well nourished, no apparent distress, no apparent deformities. ABILITY TO COMMUNICATE/QUALITY OF VOICE:  Communicated without difficulty. Normal voice. INSPECTION, HEAD AND FACE:  Normal overall appearance, with no scars, lesions or masses. SINUSES:  The maxillary and frontal sinuses were nontender, bilaterally.   SALIVARY GLANDS:  Parotid, submandibular and sublingual glands were normal.  FACIAL STRENGTH, MOTION:  Normal and equal for all five branches bilaterally. EYES:  Extraocular motion:  intact, normal primary gaze alignment. HEARING ASSESSMENT:  Finger rub:  Able to hear finger rub bilaterally. Tuning fork tests, [address] tuning fork:  Ritchie midline. Rinne air > bone bilaterally. EARS:  OTOSCOPY: The TMs had patent butterfly tubes and were otherwise normal.    EXTERNAL EAR/NOSE:  Normal pinnae and mastoids. Normal external nose. (+) NOSE:  There was a mild left inferior spur. The nasal septum was otherwise normal and midline. The inferior turbinates were normal.  The nasal mucosa and secretions were normal.  No pus or polyps were seen. (+) LIPS, TEETH AND GUMS:  There are several missing teeth. Otherwise normal.    (+) OROPHARYNX/ORALCAVITY:  Post tonsillectomy. There was a small torus palatinus. Oral mucosa, hard and soft palates, tongue, and pharynx were normal.  (+) NECK:  There was a well-healed thyroidectomy scar. There was a possible left anterior neck mass. No masses or tenderness. No abnormal appearance, asymmetry or tracheal deviation. Laryngeal cartilages and hyoid bone were normal to palpation.   (+) THYROID:There was fullness on the left consistent with left goiter versus a neck mass. No nodules, enlargement, tenderness or masses. LYMPH NODES, CERVICAL, FACIAL AND SUPRACLAVICULAR:  No lymphadenopathy.         Narrative   EXAMINATION:   THYROID ULTRASOUND       8/25/2017       COMPARISON:   March 20, 2017       HISTORY:   ORDERING PHYSICIAN PROVIDED HISTORY: Left thyroid nodule   TECHNOLOGIST PROVIDED HISTORY:       FINDINGS:   Right thyroid lobe:  Absent       Left thyroid lobe:  4.6 x 1.9 x 1.2 cm       Isthmus:  0.2 cm       Thyroid Gland:  Background echogenicity is fairly homogeneous.       Nodules: Multiple predominately cystic nodules within the left lobe.  These   all measure less than 1 cm and are similar to previous exam.       Cervical lymphadenopathy: No abnormal lymph nodes in the imaged portions of   the neck.           Impression   No substantial change in scattered predominately cystic nodules left lobe   measuring less than 1 cm.             IMPRESSION / DIAGNOSES / ORDERS:       Africa Brewer was seen today for hoarse. Diagnoses and all orders for this visit:    Hoarseness of voice    Mass of left side of neck  Comments:  left lower anterior neck at thyroid gland level, goiter versus neck mass. Orders:  -     CT Soft Tissue Neck W Contrast; Future        RECOMMENDATIONS/PLAN:    1. See Patient Instructions. 2. Repeat thyroid ultrasound in one year, about 8/25/2018. 3. Return for flexible fiberoptic nasopharyngolaryngoscopy, one week after CT scan of neck.

## 2017-09-27 ENCOUNTER — OFFICE VISIT (OUTPATIENT)
Dept: ORTHOPEDIC SURGERY | Age: 57
End: 2017-09-27

## 2017-09-27 VITALS
SYSTOLIC BLOOD PRESSURE: 124 MMHG | HEART RATE: 81 BPM | HEIGHT: 72 IN | WEIGHT: 194 LBS | BODY MASS INDEX: 26.28 KG/M2 | TEMPERATURE: 98.4 F | DIASTOLIC BLOOD PRESSURE: 82 MMHG

## 2017-09-27 DIAGNOSIS — M70.62 TROCHANTERIC BURSITIS OF LEFT HIP: ICD-10-CM

## 2017-09-27 DIAGNOSIS — M17.0 PRIMARY OSTEOARTHRITIS OF BOTH KNEES: Primary | ICD-10-CM

## 2017-09-27 PROCEDURE — 99999 PR OFFICE/OUTPT VISIT,PROCEDURE ONLY: CPT | Performed by: PHYSICIAN ASSISTANT

## 2017-09-27 RX ORDER — TERBINAFINE HYDROCHLORIDE 250 MG/1
TABLET ORAL
Qty: 30 TABLET | Refills: 0 | Status: SHIPPED | OUTPATIENT
Start: 2017-09-27 | End: 2017-11-10 | Stop reason: SDUPTHER

## 2017-09-27 RX ORDER — AMOXICILLIN AND CLAVULANATE POTASSIUM 500; 125 MG/1; MG/1
TABLET, FILM COATED ORAL
COMMUNITY
Start: 2017-09-26 | End: 2017-10-12 | Stop reason: ALTCHOICE

## 2017-09-27 RX ORDER — GENTAMICIN SULFATE 1 MG/G
OINTMENT TOPICAL
COMMUNITY
Start: 2017-09-22 | End: 2017-10-12 | Stop reason: ALTCHOICE

## 2017-10-09 ENCOUNTER — HOSPITAL ENCOUNTER (OUTPATIENT)
Dept: CT IMAGING | Age: 57
Discharge: HOME | End: 2017-10-09
Attending: OTOLARYNGOLOGY | Admitting: OTOLARYNGOLOGY

## 2017-10-09 DIAGNOSIS — R22.1 LOCALIZED SWELLING, MASS OR LUMP OF NECK: ICD-10-CM

## 2017-10-09 DIAGNOSIS — R22.1 MASS OF LEFT SIDE OF NECK: ICD-10-CM

## 2017-10-10 ENCOUNTER — CHARTING TRANS (OUTPATIENT)
Dept: OTHER | Age: 57
End: 2017-10-10

## 2017-10-10 ENCOUNTER — LAB SERVICES (OUTPATIENT)
Dept: OTHER | Age: 57
End: 2017-10-10

## 2017-10-12 ENCOUNTER — OFFICE VISIT (OUTPATIENT)
Dept: ENDOCRINOLOGY | Age: 57
End: 2017-10-12

## 2017-10-12 VITALS
WEIGHT: 203.2 LBS | DIASTOLIC BLOOD PRESSURE: 81 MMHG | HEIGHT: 72 IN | HEART RATE: 84 BPM | BODY MASS INDEX: 27.52 KG/M2 | SYSTOLIC BLOOD PRESSURE: 123 MMHG | OXYGEN SATURATION: 98 %

## 2017-10-12 DIAGNOSIS — M80.00XA AGE-RELATED OSTEOPOROSIS WITH CURRENT PATHOLOGICAL FRACTURE, INITIAL ENCOUNTER: Primary | ICD-10-CM

## 2017-10-12 DIAGNOSIS — E04.1 THYROID NODULE: ICD-10-CM

## 2017-10-12 DIAGNOSIS — S22.43XG: ICD-10-CM

## 2017-10-12 LAB
ALBUMIN SERPL-MCNC: 4.2 G/DL (ref 3.6–5.1)
ALBUMIN/GLOB SERPL: 1.4 (ref 1–2.4)
ALP SERPL-CCNC: 60 UNITS/L (ref 45–117)
ALT SERPL-CCNC: 30 UNITS/L
ANION GAP SERPL CALC-SCNC: 15 MMOL/L (ref 10–20)
AST SERPL-CCNC: 16 UNITS/L
BASOPHILS # BLD: 0 K/MCL (ref 0–0.3)
BASOPHILS NFR BLD: 0 %
BILIRUB SERPL-MCNC: 0.3 MG/DL (ref 0.2–1)
BUN SERPL-MCNC: 15 MG/DL (ref 6–20)
BUN/CREAT SERPL: 19 (ref 7–25)
CALCIUM SERPL-MCNC: 9.4 MG/DL (ref 8.4–10.2)
CHLORIDE SERPL-SCNC: 102 MMOL/L (ref 98–107)
CO2 SERPL-SCNC: 27 MMOL/L (ref 21–32)
CREAT SERPL-MCNC: 0.78 MG/DL (ref 0.51–0.95)
DIFFERENTIAL METHOD BLD: ABNORMAL
EOSINOPHIL # BLD: 0 K/MCL (ref 0.1–0.5)
EOSINOPHIL NFR BLD: 1 %
ERYTHROCYTE [DISTWIDTH] IN BLOOD: 12.9 % (ref 11–15)
GLOBULIN SER-MCNC: 3 G/DL (ref 2–4)
GLUCOSE SERPL-MCNC: 101 MG/DL (ref 65–99)
HEMATOCRIT: 40.9 % (ref 36–46.5)
HEMOGLOBIN: 13.6 G/DL (ref 12–15.5)
LENGTH OF FAST TIME PATIENT: 0 HRS
LYMPHOCYTES # BLD: 2 K/MCL (ref 1–4)
LYMPHOCYTES NFR BLD: 23 %
MEAN CORPUSCULAR HEMOGLOBIN: 29.6 PG (ref 26–34)
MEAN CORPUSCULAR HGB CONC: 33.3 G/DL (ref 32–36.5)
MEAN CORPUSCULAR VOLUME: 89.1 FL (ref 78–100)
MONOCYTES # BLD: 0.4 K/MCL (ref 0.3–0.9)
MONOCYTES NFR BLD: 5 %
NEUTROPHILS # BLD: 6.4 K/MCL (ref 1.8–7.7)
NEUTROPHILS NFR BLD: 71 %
PLATELET COUNT: 322 K/MCL (ref 140–450)
POTASSIUM SERPL-SCNC: 3.8 MMOL/L (ref 3.4–5.1)
RED CELL COUNT: 4.59 MIL/MCL (ref 4–5.2)
SODIUM SERPL-SCNC: 140 MMOL/L (ref 135–145)
TOTAL PROTEIN: 7.2 G/DL (ref 6.4–8.2)
TSH SERPL-ACNC: 1.74 MCUNITS/ML (ref 0.35–5)
WHITE BLOOD COUNT: 8.9 K/MCL (ref 4.2–11)

## 2017-10-12 PROCEDURE — 99203 OFFICE O/P NEW LOW 30 MIN: CPT | Performed by: NURSE PRACTITIONER

## 2017-10-12 RX ORDER — CIPROFLOXACIN 500 MG/1
500 TABLET, FILM COATED ORAL 2 TIMES DAILY
COMMUNITY
End: 2017-11-29 | Stop reason: ALTCHOICE

## 2017-10-12 ASSESSMENT — PATIENT HEALTH QUESTIONNAIRE - PHQ9
SUM OF ALL RESPONSES TO PHQ QUESTIONS 1-9: 0
2. FEELING DOWN, DEPRESSED OR HOPELESS: 0
1. LITTLE INTEREST OR PLEASURE IN DOING THINGS: 0
SUM OF ALL RESPONSES TO PHQ9 QUESTIONS 1 & 2: 0

## 2017-10-12 ASSESSMENT — ENCOUNTER SYMPTOMS
BACK PAIN: 0
VOICE CHANGE: 1
COLOR CHANGE: 0
SHORTNESS OF BREATH: 0
CONSTIPATION: 0
EYE PAIN: 0
DIARRHEA: 0
ABDOMINAL PAIN: 0
TROUBLE SWALLOWING: 1

## 2017-10-12 NOTE — PROGRESS NOTES
changes include what appears to be  a non-thyroid swelling in the anterior left aspect of her neck and hoarseness of her voice. · She c/o fatigue, weight changes, cold intolerance, but no bowel/skin changes or CVS symptoms. · No h/o of radiation or iodine exposure. · No FH of thyroid problems. No FH of thyroid cancer     FINDINGS:   Right thyroid lobe:  Absent       Left thyroid lobe:  4.6 x 1.9 x 1.2 cm       Isthmus:  0.2 cm       Thyroid Gland:  Background echogenicity is fairly homogeneous.       Nodules: Multiple predominately cystic nodules within the left lobe.  These   all measure less than 1 cm and are similar to previous exam.       Cervical lymphadenopathy: No abnormal lymph nodes in the imaged portions of   the neck.           Impression   No substantial change in scattered predominately cystic nodules left lobe   measuring less than 1 cm.      Lab Results   Component Value Date    TSH 1.69 07/02/2014          Past Medical History:   Diagnosis Date    Benign essential HTN     Pt. denies any history of HTN    Bradycardia     Bronchitis     Cancer ([address] 2009    BCC Rt leg,squamous cell    Classic migraine     Degenerated intervertebral disc     GERD (gastroesophageal reflux disease)     Hx of blood clots     neck,arm after first pacemaker inserted    Hypercholesterolemia     IBS (irritable bowel syndrome)     Nausea & vomiting     Neuropathy (HCC)     Osteoporosis     Peripheral vascular disease (HCC)     PONV (postoperative nausea and vomiting)     family hx also of post op n/v    Rheumatoid arthritis of the hand     left thumb MCP joint , left great toe MTP joint    Seizure ([address]     as a child due to hole in heart-last one 1961    Sick sinus syndrome ([address]     s/p pacemaker 2008    Sleep apnea     bi-pap    Thyroid nodule     benign s/p surgery       Family History   Problem Relation Age of Onset    Rheum Arthritis Mother     Hypertension Mother     Cancer Father      lung   

## 2017-10-12 NOTE — ASSESSMENT & PLAN NOTE
· 57 year patient with a h/o of early menopause and no HRT, who has been on biphosphonates for almost a total of 5+ years (Actonel + Fosamax) On a drug holiday since 01/2017. · Multiple rib fractures and a repeat DEXA shows osteopenia. · Dietary calcium may be inadequate   · Work up as below    Therapeutic Plan  Calcium, target 1200 mg daily; recommended calcium intake and the effects of excessive calcium intake from supplements. Vitamin D, recommended amount to be determined after review of 25-OH D lab result. Current vitamin D intake is fine@ 81494 IU qweekly  Exercise : is limited due to pain with deep breathing and exertion. Discussed avoiding activities that place compressive forces on her spine; specifically pushing, pulling, bending, lifting and twisting to help prevent vertebral fractures.   Pharmacologic therapy: to be reviewed after her labs

## 2017-10-17 ENCOUNTER — CHARTING TRANS (OUTPATIENT)
Dept: OTHER | Age: 57
End: 2017-10-17

## 2017-10-17 LAB — PAP WITH HIGH RISK HPV: NORMAL

## 2017-10-18 ENCOUNTER — OFFICE VISIT (OUTPATIENT)
Dept: ORTHOPEDIC SURGERY | Age: 57
End: 2017-10-18

## 2017-10-18 VITALS
BODY MASS INDEX: 26.28 KG/M2 | WEIGHT: 194 LBS | HEART RATE: 83 BPM | HEIGHT: 72 IN | TEMPERATURE: 98.2 F | SYSTOLIC BLOOD PRESSURE: 121 MMHG | DIASTOLIC BLOOD PRESSURE: 83 MMHG

## 2017-10-18 DIAGNOSIS — M75.81 RIGHT ROTATOR CUFF TENDONITIS: ICD-10-CM

## 2017-10-18 DIAGNOSIS — M70.62 TROCHANTERIC BURSITIS OF LEFT HIP: ICD-10-CM

## 2017-10-18 DIAGNOSIS — M25.511 ACUTE PAIN OF BOTH SHOULDERS: Primary | ICD-10-CM

## 2017-10-18 DIAGNOSIS — M75.82 ROTATOR CUFF TENDONITIS, LEFT: ICD-10-CM

## 2017-10-18 DIAGNOSIS — M25.512 ACUTE PAIN OF BOTH SHOULDERS: Primary | ICD-10-CM

## 2017-10-18 PROCEDURE — 20610 DRAIN/INJ JOINT/BURSA W/O US: CPT | Performed by: PHYSICIAN ASSISTANT

## 2017-10-18 PROCEDURE — 99214 OFFICE O/P EST MOD 30 MIN: CPT | Performed by: PHYSICIAN ASSISTANT

## 2017-10-18 NOTE — PROGRESS NOTES
times daily 60 tablet 3    ondansetron (ZOFRAN) 4 MG tablet Take 1 tablet by mouth every 8 hours as needed for Nausea 10 tablet 0    pravastatin (PRAVACHOL) 40 MG tablet TAKE ONE TABLET BY MOUTH DAILY (Patient taking differently: Take 40 mg by mouth nightly TAKE ONE TABLET BY MOUTH DAILY) 90 tablet 3    vitamin D (ERGOCALCIFEROL) 98291 UNITS CAPS capsule Take 1 capsule by mouth once a week 4 capsule 5    b complex vitamins capsule Take 1 capsule by mouth daily      Multiple Vitamins-Minerals (THERAPEUTIC MULTIVITAMIN-MINERALS) tablet Take 1 tablet by mouth daily. No current facility-administered medications for this visit. Objective:     She is alert, oriented x 3, pleasant, well nourished, developed and in no   acute distress. /83   Pulse 83   Temp 98.2 °F (36.8 °C) (Temporal)   Ht 6' (1.829 m)   Wt 194 lb (88 kg)   BMI 26.31 kg/m²        Examination of the left shoulder shows: There is no deformity. There is no erythema. There is no  soft tissue swelling. Deltoid region is  tender to palpation. AC Joint is not tender to palpation. Clavicle is not tender to palpation. Bicipital Groove is not  tender to palpation. Pectoralis  is not tender to palpation. Scapula/ trapezius is not tender to palpation. There is mild weakness with supraspinatus testing. There is moderate pain with supraspinatus testing. Yergason Test negative. Drop Arm Test negative. Apprehension Test negative. Cross Arm Test negative. Shoulder AROM-            Examination of the right shoulder shows: There is no deformity. There is no erythema. There is no  soft tissue swelling. Deltoid region is  tender to palpation. AC Joint is not tender to palpation. Clavicle is not tender to palpation. Bicipital Groove is not  tender to palpation. Pectoralis  is not tender to palpation. Scapula/ trapezius is not tender to palpation.   There is mild weakness with supraspinatus testing. There is moderate pain with supraspinatus testing. Yergason Test negative. Drop Arm Test negative. Apprehension Test negative. Cross Arm Test negative. Shoulder AROM-                  Examination of the upper extremities are intact with sensation to light touch. Motor testing  5/5 in all major motor groups including hand intrinsics. Radial, Median and Ulnar nerves are intact. Escobedo's Sign absent. Examination of the upper extremities shows intact perfusion to all extremities. No cyanosis. Digits are warm to touch. Capillary refill is less than 2 seconds. No edema noted. Examination of the skin over the upper extremities:  Reveals the skin to be intact without lacerations or abrasions. There are no significant erythema, rashes or skin lesions. Examination of the left hip shows: There is not deformity. There is not erythema. There is no pain with internal and external rotation. There is no pain with flexion and extension. There is no pain with active SLR. ROM diminished range of motion. Leg lengths: Equal  Trochanteric region is  tender to palpation. Sacral Iliac is not tender to palpation. There is no pain with weight bearing. X Rays: performed in the office today:   AP, Y and Axillary views of the left shoulder. There is no evidence of fracture or dislocation. The subacromial space is mild  narrowed. There is evidence of mild  AC joint arthritis. There is not evidence of Gleno-Humeral arthritis. AP, Y and Axillary views of the right shoulder. There is no evidence of fracture or dislocation. The subacromial space is mild  narrowed. There is evidence of mild  AC joint arthritis. There is not evidence of Gleno-Humeral arthritis. Additional Tests reviewed: none  Additional Outside Records reviewed: none    Diagnosis:       ICD-10-CM ICD-9-CM    1.  Acute pain of both shoulders M25.511 719.41 XR SHOULDER LEFT (MIN 2 VIEWS)    M25.512

## 2017-10-19 ENCOUNTER — IMAGING SERVICES (OUTPATIENT)
Dept: OTHER | Age: 57
End: 2017-10-19

## 2017-10-19 ENCOUNTER — HOSPITAL (OUTPATIENT)
Dept: OTHER | Age: 57
End: 2017-10-19
Attending: PHYSICIAN ASSISTANT

## 2017-10-20 ENCOUNTER — CHARTING TRANS (OUTPATIENT)
Dept: OTHER | Age: 57
End: 2017-10-20

## 2017-10-23 ENCOUNTER — OFFICE VISIT (OUTPATIENT)
Dept: PRIMARY CARE CLINIC | Age: 57
End: 2017-10-23

## 2017-10-23 VITALS
OXYGEN SATURATION: 93 % | BODY MASS INDEX: 26.72 KG/M2 | HEART RATE: 85 BPM | TEMPERATURE: 98.8 F | RESPIRATION RATE: 18 BRPM | SYSTOLIC BLOOD PRESSURE: 143 MMHG | WEIGHT: 197 LBS | DIASTOLIC BLOOD PRESSURE: 90 MMHG

## 2017-10-23 DIAGNOSIS — M77.8 RIGHT SHOULDER TENDONITIS: ICD-10-CM

## 2017-10-23 DIAGNOSIS — K21.9 GASTROESOPHAGEAL REFLUX DISEASE WITHOUT ESOPHAGITIS: Primary | ICD-10-CM

## 2017-10-23 DIAGNOSIS — L98.9 SKIN PROBLEM: ICD-10-CM

## 2017-10-23 DIAGNOSIS — E04.1 THYROID NODULE: ICD-10-CM

## 2017-10-23 PROCEDURE — 99214 OFFICE O/P EST MOD 30 MIN: CPT | Performed by: INTERNAL MEDICINE

## 2017-10-23 RX ORDER — CELECOXIB 200 MG/1
200 CAPSULE ORAL DAILY
Qty: 5 CAPSULE | Refills: 1 | Status: SHIPPED | OUTPATIENT
Start: 2017-10-23 | End: 2017-11-02 | Stop reason: SDUPTHER

## 2017-10-23 ASSESSMENT — ENCOUNTER SYMPTOMS
EYE PAIN: 0
CHOKING: 0
RHINORRHEA: 0
WHEEZING: 0
ABDOMINAL DISTENTION: 0
EYE REDNESS: 0
TROUBLE SWALLOWING: 0
COUGH: 0
APNEA: 0
BACK PAIN: 1
CHEST TIGHTNESS: 0
DIARRHEA: 0
NAUSEA: 0
VOMITING: 0
VOICE CHANGE: 1
EYE DISCHARGE: 0
SORE THROAT: 0
BLOOD IN STOOL: 0
EYE ITCHING: 0
ABDOMINAL PAIN: 0
PHOTOPHOBIA: 0
CONSTIPATION: 0
RECTAL PAIN: 0
ANAL BLEEDING: 0
STRIDOR: 0
SINUS PRESSURE: 0

## 2017-10-23 NOTE — PROGRESS NOTES
tablet 0    mupirocin (BACTROBAN) 2 % ointment       DULoxetine (CYMBALTA) 60 MG extended release capsule TAKE ONE CAPSULE BY MOUTH DAILY 30 capsule 2    ketoconazole (NIZORAL) 2 % cream Apply topically twice daily. 30 g 1    budesonide-formoterol (SYMBICORT) 160-4.5 MCG/ACT AERO Inhale 2 puffs into the lungs 2 times daily 1 Inhaler 3    albuterol sulfate HFA (VENTOLIN HFA) 108 (90 Base) MCG/ACT inhaler Inhale 2 puffs into the lungs every 6 hours as needed for Wheezing (cough) 1 Inhaler 3    amLODIPine (NORVASC) 2.5 MG tablet TAKE ONE TABLET BY MOUTH DAILY (Patient taking differently: TAKE ONE TABLET BY MOUTH DAILY AT BEDTIME) 30 tablet 11    gabapentin (NEURONTIN) 300 MG capsule Take 1 capsule by mouth 3 times daily 90 capsule 3    pantoprazole (PROTONIX) 40 MG tablet Take 1 tablet by mouth 2 times daily 60 tablet 3    ondansetron (ZOFRAN) 4 MG tablet Take 1 tablet by mouth every 8 hours as needed for Nausea 10 tablet 0    pravastatin (PRAVACHOL) 40 MG tablet TAKE ONE TABLET BY MOUTH DAILY (Patient taking differently: Take 40 mg by mouth nightly TAKE ONE TABLET BY MOUTH DAILY) 90 tablet 3    vitamin D (ERGOCALCIFEROL) 75670 UNITS CAPS capsule Take 1 capsule by mouth once a week 4 capsule 5    b complex vitamins capsule Take 1 capsule by mouth daily      Multiple Vitamins-Minerals (THERAPEUTIC MULTIVITAMIN-MINERALS) tablet Take 1 tablet by mouth daily.  ciprofloxacin (CIPRO) 500 MG tablet Take 500 mg by mouth 2 times daily       No current facility-administered medications on file prior to visit. Allergies   Allergen Reactions    Keflex [Cephalexin] Nausea And Vomiting     vomiting    Adhesive Tape Other (See Comments)     Red & painful    Morphine Other (See Comments)     Hyper.  Pt refuse     Patient Active Problem List   Diagnosis    Herpes zoster    Neuropathy ([address]    Osteoporosis    Hypercholesterolemia    Sick sinus syndrome ([address]    Thyroid nodule    Classic migraine    oriented to person, place, and time. She has normal reflexes. No cranial nerve deficit. She exhibits normal muscle tone. Coordination normal.   Skin: Skin is warm and dry. No rash noted. She is not diaphoretic. No erythema. No pallor.        echar removed and skin is well healed underneath    Vertical scar below well healed. Both are from removal of basal cell carcinomal   Psychiatric: She has a normal mood and affect. Her behavior is normal. Judgment and thought content normal.       Assessment:      1. Gastroesophageal reflux disease without esophagitis with good symptom control will need to continue Protonix twice a day and monitor magnesium level and renal function. RENAL FUNCTION PANEL    MAGNESIUM   2. Right shoulder tendonitis status post steroid injection. Since gastritis healed we'll give Celebrex 200 mg daily for 1 week only. celecoxib (CELEBREX) 200 MG capsule   3. Thyroid nodule , ultrasound ordered    4. Skin problem hyperpigmentation and thickening of the skin will have patient see dermatologist.  Appointment scheduled for December but will have her go within a month. Plan:      Naheed Rios received counseling on the following healthy behaviors: medication adherence    Patient given educational materials on After visit summary with diagnosis and treatment plan. I have instructed Naheed Rios to complete a self tracking handout on Weights and instructed them to bring it with them to her next appointment. Discussed use, benefit, and side effects of prescribed medications. Barriers to medication compliance addressed. All patient questions answered. Pt voiced understanding.

## 2017-10-24 ENCOUNTER — OFFICE VISIT (OUTPATIENT)
Dept: ENT CLINIC | Age: 57
End: 2017-10-24

## 2017-10-24 VITALS
WEIGHT: 197.4 LBS | BODY MASS INDEX: 26.77 KG/M2 | SYSTOLIC BLOOD PRESSURE: 126 MMHG | HEART RATE: 87 BPM | DIASTOLIC BLOOD PRESSURE: 84 MMHG

## 2017-10-24 DIAGNOSIS — J37.0 CHRONIC LARYNGITIS: Chronic | ICD-10-CM

## 2017-10-24 DIAGNOSIS — R49.0 HOARSENESS OF VOICE: Primary | ICD-10-CM

## 2017-10-24 DIAGNOSIS — R22.1 MASS OF LEFT SIDE OF NECK: Chronic | ICD-10-CM

## 2017-10-24 DIAGNOSIS — K21.9 LPRD (LARYNGOPHARYNGEAL REFLUX DISEASE): Chronic | ICD-10-CM

## 2017-10-24 PROCEDURE — 31575 DIAGNOSTIC LARYNGOSCOPY: CPT | Performed by: OTOLARYNGOLOGY

## 2017-10-24 ASSESSMENT — PATIENT HEALTH QUESTIONNAIRE - PHQ9
1. LITTLE INTEREST OR PLEASURE IN DOING THINGS: 0
SUM OF ALL RESPONSES TO PHQ QUESTIONS 1-9: 0
2. FEELING DOWN, DEPRESSED OR HOPELESS: 0
SUM OF ALL RESPONSES TO PHQ9 QUESTIONS 1 & 2: 0

## 2017-10-24 NOTE — PATIENT INSTRUCTIONS
Laryngopharyngeal Reflux    Laryngopharyngeal reflux (LPR) is a condition in which stomach fluid refluxes, or flows backwards, up into your throat. This affects the back area of your voice box. It happens hundreds of times a day and involves a very small amount of fluid each time. There is no sensation or awareness of this reflux, such as the heartburn, pain, or indigestion associated with a related condition, gastroesophageal reflux, or GERD. These two conditions are distinctly different, although they may coexist in a given person. Generally, you will not be aware when LPR is happening. However, the stomach fluid contains an enzyme, called pepsin, which causes inflammation in your throat, which in turn, causes your symptoms. The most common symptoms associated with LPR reflux are a sensation of a lump in the throat or mucus that you cannot clear or something stuck in the throat, chronic cough, chronic hoarseness, sorethroat, and postnasal drainage. The most common sign of LPR is frequent throat clearing. The only effective treatment for this condition is use of a medication, called a proton pump inhibitor (PPI). This medication decreases the amount of acid your stomach lining makes and puts into the stomach fluid. This raises the pH of the stomach fluid. Under this condition the pepsin is less active when reflux does occur and it, therefore, does not irritate your throat as much. Research with omeprazole, one type of PPI medication, showed that twice daily therapy may be needed to provide maximum and optimum effect to control or improve these symptoms. In addition, the PPI medication must be taken on an empty stomach to maximize digestion and absorption of the medication. Your stomach is empty when you have had nothing to eat or drink for the preceding two hours.   Further, for maximum effectiveness, you must eat a meal or snack about 45 minutes after each dose to maximize uptake from the bloodstream of the medication by the acid producing cells of the stomach in order to decrease the amount of acid your stomach makes. If this happens, the pH of the fluids in the stomach is increased from 2 to 4 and the pepsin enzyme is inactive, or less active, and causes less inflammation of your throat. This will decrease your symptoms. We will start with once daily PPI therapy, on an empty stomach with a meal or snack 45 minutes later. We will then reassess your condition in 8 weeks. The dosing interval may need to be increased to twice daily therapy or the dosage increased. Twice daily therapy may be accomplished by taking the medication first thing each morning and delaying your breakfast for about 45 minutes. Then, you can take the second dose of the medication 45 minutes before your dinner meal.  If you forget to take your pill before you eat dinner, wait until two hours after dinner and take your pill. Then, have a light snack or finish your dinner about 45 minutes later. Your primary physician or gastroenterologist may also be treating you for GERD. This treatment for LPR will not interfere with your GERD management. Please be aware of the possible side effects and interactions of Pantoprazole (Protonix), including, but not limited to:  allergic reaction, nausea, headache, diarrhea, increased risk of fractures wrists or hips or spine, increased risk of Clostridium difficile-associated diarrhea (CDAD), low serum magnesium, excess gas, bloating, stomach pain, constipation, dry mouth, blisters, or peeling skin. A recent study has shown a possible association of omeprazole with increased risk of heart attack. A recent study has shown a possible association of omeprazole with kidney damage or disease. These potential adverse effects are considered to be very rare, if they actually occur at all secondary to these medications.   A recent consensus statement of national gastroenterologist has found no increased incidence of these adverse effects with PPI medications. You should discuss these with your primary care physician. Also, read all information given by your pharmacist regarding your medications. Please ask if you have any additional questions.

## 2017-10-24 NOTE — PROGRESS NOTES
topical 4% lidocaine solution by nasal sprayer, twice. After about ten minutes, the Endo-sheath was placed on the flexible fiberoptic nasopharyngolaryngoscope, which then was inserted through the right nare/nasal cavity and advanced to the nasopharynx and then to the hypopharynx and larynx. After adequate endoscopic visualization, the endoscope was removed. The patient appeared to tolerate the procedure well with no evidence of perioperative complications. Narrative   EXAMINATION:   CT OF THE NECK SOFT TISSUE WITH CONTRAST  10/9/2017       TECHNIQUE:   CT of the neck was performed with the administration of intravenous contrast.   Multiplanar reformatted images are provided for review. Dose modulation,   iterative reconstruction, and/or weight based adjustment of the mA/kV was   utilized to reduce the radiation dose to as low as reasonably achievable.       COMPARISON:   None.       HISTORY:   ORDERING PHYSICIAN PROVIDED HISTORY: Mass of left side of neck   TECHNOLOGIST PROVIDED HISTORY:   Technologist Provided Reason for Exam: evaluation of a mass in the left   anterior lower neck at thyroid level, but appears to be external to thyroid   on exam, and possibly consistent with a lipoma since August, partial   thyroidectomy, hx basal cell skin ca   Acuity: Acute   Type of Encounter: Initial       FINDINGS:   PHARYNX/LARYNX:  The palatine tonsils are normal in appearance.  The tongue   is normal in appearance.  The valleculae, epiglottis, aryepiglottic folds and   pyriform sinuses appear unremarkable.  The true and false vocal cords are   normal in appearance.  No mass or abscess is seen.       SALIVARY GLANDS/THYROID:  The parotid and submandibular glands appear   unremarkable.  The thyroid gland appears unremarkable without evidence of a   mass or nodule.       LYMPH NODES:  No cervical or supraclavicular lymphadenopathy is seen.       SOFT TISSUES:  No appreciable soft tissue swelling or mass is seen. Theresa Dial is   a radiopaque marker projecting at the skin surface anterior to the left   thyroid lobe which demonstrates mild heterogeneity.  The right thyroid lobe   has been resected previously.       BRAIN/ORBITS/SINUSES:  The visualized portion of the intracranial contents   appear unremarkable.  The visualized portion of the orbits, paranasal sinuses   and mastoid air cells demonstrate no acute abnormality.       LUNG APICES/SUPERIOR MEDIASTINUM:  No focal consolidation is seen within the   visualized lung apices.  No superior mediastinal lymphadenopathy or mass. The visualized portion of the trachea appears unremarkable.       BONES:  No aggressive appearing lytic or blastic bony lesion.           Impression   No acute abnormality of the soft tissue structures of the neck.  No focal   mass is identified at the site of palpable abnormality. Theresa Dial is asymmetry   in relation to the fact that the patient has had right thyroidectomy while   the left thyroid lobe remains present.               IMPRESSION / Pablito Barnes / Duane Bougie / Honorio Morley was seen today for hoarse. Diagnoses and all orders for this visit:    Hoarseness of voice    LPRD (laryngopharyngeal reflux disease)    Chronic laryngitis  Comments:  LPR related    Mass of left side of neck  Comments:  appears to be the left thyroid lobe         RECOMMENDATIONS / PLAN    1. See Patient Instructions. 2. Return in about 6 months (around 4/24/2018) for recheck/follow-up, possible flex throat endoscopy, and sooner if condition worsens.

## 2017-10-30 ENCOUNTER — HOSPITAL ENCOUNTER (OUTPATIENT)
Dept: ULTRASOUND IMAGING | Age: 57
Discharge: HOME | End: 2017-10-30
Attending: INTERNAL MEDICINE | Admitting: NURSE PRACTITIONER

## 2017-10-30 ENCOUNTER — PATIENT MESSAGE (OUTPATIENT)
Dept: ENDOCRINOLOGY | Age: 57
End: 2017-10-30

## 2017-10-30 DIAGNOSIS — K21.9 GASTROESOPHAGEAL REFLUX DISEASE WITHOUT ESOPHAGITIS: ICD-10-CM

## 2017-10-30 DIAGNOSIS — M80.00XA AGE-RELATED OSTEOPOROSIS WITH CURRENT PATHOLOGICAL FRACTURE, INITIAL ENCOUNTER: ICD-10-CM

## 2017-10-30 DIAGNOSIS — S22.43XG: ICD-10-CM

## 2017-10-30 DIAGNOSIS — E04.1 THYROID NODULE: ICD-10-CM

## 2017-10-30 LAB
24HR URINE VOLUME (ML): 2000 ML
ALBUMIN SERPL-MCNC: 4.5 G/DL (ref 3.4–5)
ALP BLD-CCNC: 109 U/L (ref 40–129)
ANION GAP SERPL CALCULATED.3IONS-SCNC: 14 MMOL/L (ref 3–16)
BUN BLDV-MCNC: 18 MG/DL (ref 7–20)
CALCIUM SERPL-MCNC: 9.4 MG/DL (ref 8.3–10.6)
CHLORIDE BLD-SCNC: 102 MMOL/L (ref 99–110)
CO2: 27 MMOL/L (ref 21–32)
CREAT SERPL-MCNC: 0.7 MG/DL (ref 0.6–1.1)
CREATININE 24 HOUR URINE: 1 G/24HR (ref 0.6–1.5)
GFR AFRICAN AMERICAN: >60
GFR NON-AFRICAN AMERICAN: >60
GLUCOSE BLD-MCNC: 80 MG/DL (ref 70–99)
HCT VFR BLD CALC: 44.2 % (ref 36–48)
HEMOGLOBIN: 14 G/DL (ref 12–16)
MAGNESIUM: 2.5 MG/DL (ref 1.8–2.4)
MCH RBC QN AUTO: 30.2 PG (ref 26–34)
MCHC RBC AUTO-ENTMCNC: 31.8 G/DL (ref 31–36)
MCV RBC AUTO: 95 FL (ref 80–100)
PARATHYROID HORMONE INTACT: 56 PG/ML (ref 14–72)
PDW BLD-RTO: 14.2 % (ref 12.4–15.4)
PHOSPHORUS: 3.5 MG/DL (ref 2.5–4.9)
PLATELET # BLD: 339 K/UL (ref 135–450)
PMV BLD AUTO: 8.2 FL (ref 5–10.5)
POTASSIUM SERPL-SCNC: 3.8 MMOL/L (ref 3.5–5.1)
RBC # BLD: 4.65 M/UL (ref 4–5.2)
SODIUM BLD-SCNC: 143 MMOL/L (ref 136–145)
T3 FREE: 2.1 PG/ML (ref 2.3–4.2)
T4 FREE: 1.2 NG/DL (ref 0.9–1.8)
TSH SERPL DL<=0.05 MIU/L-ACNC: 1.94 UIU/ML (ref 0.27–4.2)
VITAMIN D 25-HYDROXY: 47 NG/ML
WBC # BLD: 8.5 K/UL (ref 4–11)

## 2017-10-30 NOTE — TELEPHONE ENCOUNTER
From: Abdifatah Amaya  To: Osiel Reyes, JUSTEN  Sent: 10/30/2017 11:20 AM EDT  Subject: Visit Follow-Up Question    Celso Ponce,  Just a quick note to let you know I completed the bloodwork and 24-hour urine test plus the thyroid ultrasound this morning (10/30). Looking forward to the results.    Have a wonderful day,  Bharti Crain   : 1960

## 2017-10-31 LAB
24HR URINE VOLUME (ML): 2000 ML
CALCIUM 24 HOUR URINE: 184 MG/24 HR (ref 42–353)
CREATININE 24 HOUR URINE: 1 G/24HR (ref 0.6–1.5)

## 2017-11-05 LAB — THYROGLOBULIN BY LC-MS/MS, SERUM/PLASMA: 19.2 NG/ML (ref 1.3–31.8)

## 2017-11-07 ENCOUNTER — TELEPHONE (OUTPATIENT)
Dept: ENDOCRINOLOGY | Age: 57
End: 2017-11-07

## 2017-11-07 NOTE — TELEPHONE ENCOUNTER
Called pt and l/m to call us back to reschedule her appt to see Dr. Lou Cuevas Per Balwinder Erickson, JUSTEN.      11/7/2017 @ 402pm.

## 2017-11-08 ENCOUNTER — PATIENT MESSAGE (OUTPATIENT)
Dept: ENDOCRINOLOGY | Age: 57
End: 2017-11-08

## 2017-11-08 NOTE — TELEPHONE ENCOUNTER
From: Mulugeta Roberson  To: Aren Arias CNP  Sent: 11/8/2017 9:34 AM EST  Subject: Test Results Question    I have a question about [id number] resulted on 10/30/17 at 9:27 AM.  Celso Valenzuela, thanks for the test result. What is my next step? I'm going to my  dermatologist on 12/1, should I have her look at this lump on my neck? Or should I do something else?   Thanks,  Moshe Galeano

## 2017-11-10 DIAGNOSIS — B35.1 NAIL FUNGUS: ICD-10-CM

## 2017-11-14 RX ORDER — TERBINAFINE HYDROCHLORIDE 250 MG/1
TABLET ORAL
Qty: 30 TABLET | Refills: 0 | Status: SHIPPED | OUTPATIENT
Start: 2017-11-14 | End: 2017-12-27 | Stop reason: ALTCHOICE

## 2017-11-15 ENCOUNTER — OFFICE VISIT (OUTPATIENT)
Dept: ORTHOPEDIC SURGERY | Age: 57
End: 2017-11-15

## 2017-11-15 VITALS
BODY MASS INDEX: 26.28 KG/M2 | SYSTOLIC BLOOD PRESSURE: 134 MMHG | TEMPERATURE: 98.1 F | DIASTOLIC BLOOD PRESSURE: 94 MMHG | HEART RATE: 79 BPM | WEIGHT: 194 LBS | HEIGHT: 72 IN

## 2017-11-15 DIAGNOSIS — M17.0 PRIMARY OSTEOARTHRITIS OF BOTH KNEES: Primary | ICD-10-CM

## 2017-11-15 PROCEDURE — 99212 OFFICE O/P EST SF 10 MIN: CPT | Performed by: PHYSICIAN ASSISTANT

## 2017-11-15 PROCEDURE — 20610 DRAIN/INJ JOINT/BURSA W/O US: CPT | Performed by: PHYSICIAN ASSISTANT

## 2017-11-15 NOTE — PROGRESS NOTES
Subjective:      Patient ID: Rocio Lund is a 61 y.o.  female. Chief Complaint   Patient presents with    Shoulder Pain     bilateral shoulder pain    Knee Pain     bilateral knee pain        HPI: She is here for evaluation and treatment of left and right knee pain related to arthritis. She states the previous injection which was performed 5/31/2017 helped relieve the knee symptoms. The bilateral knee pain has gradually returned. There has not been a recent injury. Pain is 7/10. Pain is worse with activity. Pain improves somewhat with rest and elevation. She recently was treated for bilateral rotator cuff tendinitis with cortisone injections on 10/18/2017. She states her shoulders are doing well since the injections. She is doing a home exercise program which is helping. Review of Systems:   Negative for fever or chills. Negative for numbness or tingling around the knee.     Past Medical History:   Diagnosis Date    Benign essential HTN     Pt. denies any history of HTN    Bradycardia     Bronchitis     Cancer ([address] 2009    BCC Rt leg,squamous cell    Classic migraine     Degenerated intervertebral disc     GERD (gastroesophageal reflux disease)     Hx of blood clots     neck,arm after first pacemaker inserted    Hypercholesterolemia     IBS (irritable bowel syndrome)     Nausea & vomiting     Neuropathy (HCC)     Osteoporosis     Peripheral vascular disease (HCC)     PONV (postoperative nausea and vomiting)     family hx also of post op n/v    Rheumatoid arthritis of the hand     left thumb MCP joint , left great toe MTP joint    Seizure ([address]     as a child due to hole in heart-last one [address] sinus syndrome ([address]     s/p pacemaker 2008    Sleep apnea     bi-pap    Thyroid nodule     benign s/p surgery       Family History   Problem Relation Age of Onset    Rheum Arthritis Mother     Hypertension Mother     Cancer Father      lung    Diabetes Father     COPD Father palpation. AC Joint is not tender to palpation. Clavicle is not tender to palpation. Bicipital Groove is not  tender to palpation. Pectoralis  is not tender to palpation. Scapula/ trapezius is not tender to palpation. There is no weakness with supraspinatus testing. There is mild pain with supraspinatus testing. X Rays: was not performed in the office today:       Assessment:       ICD-10-CM ICD-9-CM    1. Primary osteoarthritis of both knees M17.0 715.16 XR KNEE RIGHT (1-2 VIEWS)      XR KNEE LEFT (1-2 VIEWS)      XR Knee Bilateral Standing      20610 - SD DRAIN/INJECT LARGE JOINT/BURSA      SD TRIAMCINOLONE ACETONIDE INJ        Plan:     The natural history of the patient's diagnosis as well as the treatment options were discussed in full and questions were answered. Risks and benefits of the treatment options also reviewed in detail. May increase blood sugars in diabetics. She understands that eventually knee arthroplasty will be the surgical correction needed to control the pain related to the arthritic knee condition. And when the injections fail to control the symptoms then surgical correction should be considered. Risk and benefits of corticosteroid intra-articular injection was discussed today. All questions were answered to her satisfaction. She verbally consented to proceed with intra-articular injection today. Cortisone Injection                                                       PROCEDURE NOTE:     Pre op Diagnosis: Knee pain/ DJD left and right Knee     Post op Diagnosis: Same  With the patient's permission, her left and right knee was prepped  in standard sterile fashion with  Alcohol and 2 cc of 0.25% Marcaine and 1 cc of Kenalog 40 mg was injected into the left lateral compartment  without difficulty. The patient tolerated this well without difficulty. A band-aid was applied.  The patient was advised to ice the knee for 15-20 minutes to relieve any injection site

## 2017-11-16 DIAGNOSIS — E78.00 HYPERCHOLESTEROLEMIA: Primary | ICD-10-CM

## 2017-11-29 ENCOUNTER — OFFICE VISIT (OUTPATIENT)
Dept: ENDOCRINOLOGY | Age: 57
End: 2017-11-29

## 2017-11-29 VITALS
SYSTOLIC BLOOD PRESSURE: 116 MMHG | DIASTOLIC BLOOD PRESSURE: 76 MMHG | OXYGEN SATURATION: 98 % | BODY MASS INDEX: 27.09 KG/M2 | WEIGHT: 200 LBS | HEIGHT: 72 IN | HEART RATE: 78 BPM

## 2017-11-29 DIAGNOSIS — E55.9 VITAMIN D DEFICIENCY: ICD-10-CM

## 2017-11-29 DIAGNOSIS — E04.2 MULTIPLE THYROID NODULES: ICD-10-CM

## 2017-11-29 DIAGNOSIS — E89.0 S/P PARTIAL THYROIDECTOMY: ICD-10-CM

## 2017-11-29 DIAGNOSIS — Z92.29 HISTORY OF BISPHOSPHONATE THERAPY: ICD-10-CM

## 2017-11-29 DIAGNOSIS — M81.0 POSTMENOPAUSAL OSTEOPOROSIS: Primary | ICD-10-CM

## 2017-11-29 PROCEDURE — 99215 OFFICE O/P EST HI 40 MIN: CPT | Performed by: INTERNAL MEDICINE

## 2017-11-29 ASSESSMENT — PATIENT HEALTH QUESTIONNAIRE - PHQ9
1. LITTLE INTEREST OR PLEASURE IN DOING THINGS: 0
SUM OF ALL RESPONSES TO PHQ QUESTIONS 1-9: 0
SUM OF ALL RESPONSES TO PHQ9 QUESTIONS 1 & 2: 0
2. FEELING DOWN, DEPRESSED OR HOPELESS: 0

## 2017-11-29 NOTE — PROGRESS NOTES
Patient ID: Ren Mendosa is a 61 y.o. female    Chief Complaint: Postmenopausal Osteoporosis, vit d def and goiter     HPI:   Ren Mendosa is here for an evaluation of osteoporosis. DXA scan in June 2016 showed T score -1.5 at left hip, -2.2 at femoral neck, -2.5 at left distal forearm. Stable DXA scan. Actonel stopped in March 2013, took it for about 5 years. Fosamax May 2013 till Jan 2017. At age 11, left foot fracture after tripping over numb foot, managed conservatively. Left leg fracture and right heel fracture in 2014. Rib fractures in 2017. Mother with osteoporosis. Maternal GM osteoporosis and hip fracture. Joint cortisone shots every 6 months since 2013. Last shot in Nov 2017. There  is  no previous history of prolonged immobility , malignant disease or its treatment, symptoms of malabsorption, thyroid disease, liver disease, alcohol abuse or smoking. Age of menopause was 42 years. There is no history of recent jaw surgery or planned jaw surgery. He maintains follow-up with the dentist on regular basis. Total daily calcium intake is approximately 1000 mg/day. Cheese daily. MVTonce a day. Supplemental vitamin D 50,000 units weekly. Good balance          Multinodular goiter:   Right lobectomy 2008     TFTS normal   US Oct 2017 showed s/p right lobectomy. Left side has multiple subcentimeter nodules.      The following portions of the patient's history were reviewed and updated as appropriate:     Family History   Problem Relation Age of Onset    Rheum Arthritis Mother     Hypertension Mother     Osteoporosis Mother     Cancer Father      lung    Diabetes Father     COPD Father     High Blood Pressure Sister    [facility] Migraines Sister     Osteoporosis Maternal Grandmother          Social History     Social History    Marital status: Single     Spouse name: N/A    Number of children: N/A    Years of education: N/A     Occupational Outpatient Prescriptions:     DULoxetine (CYMBALTA) 60 MG extended release capsule, TAKE ONE CAPSULE BY MOUTH DAILY, Disp: 30 capsule, Rfl: 5    terbinafine (LAMISIL) 250 MG tablet, TAKE ONE TABLET BY MOUTH DAILY, Disp: 30 tablet, Rfl: 0    budesonide-formoterol (SYMBICORT) 160-4.5 MCG/ACT AERO, Inhale 2 puffs into the lungs 2 times daily, Disp: 1 Inhaler, Rfl: 3    albuterol sulfate HFA (VENTOLIN HFA) 108 (90 Base) MCG/ACT inhaler, Inhale 2 puffs into the lungs every 6 hours as needed for Wheezing (cough), Disp: 1 Inhaler, Rfl: 3    amLODIPine (NORVASC) 2.5 MG tablet, TAKE ONE TABLET BY MOUTH DAILY (Patient taking differently: TAKE ONE TABLET BY MOUTH DAILY AT BEDTIME), Disp: 30 tablet, Rfl: 11    gabapentin (NEURONTIN) 300 MG capsule, Take 1 capsule by mouth 3 times daily, Disp: 90 capsule, Rfl: 3    pantoprazole (PROTONIX) 40 MG tablet, Take 1 tablet by mouth 2 times daily, Disp: 60 tablet, Rfl: 3    ondansetron (ZOFRAN) 4 MG tablet, Take 1 tablet by mouth every 8 hours as needed for Nausea, Disp: 10 tablet, Rfl: 0    pravastatin (PRAVACHOL) 40 MG tablet, TAKE ONE TABLET BY MOUTH DAILY (Patient taking differently: Take 40 mg by mouth nightly TAKE ONE TABLET BY MOUTH DAILY), Disp: 90 tablet, Rfl: 3    vitamin D (ERGOCALCIFEROL) 85237 UNITS CAPS capsule, Take 1 capsule by mouth once a week, Disp: 4 capsule, Rfl: 5    b complex vitamins capsule, Take 1 capsule by mouth daily, Disp: , Rfl:     Multiple Vitamins-Minerals (THERAPEUTIC MULTIVITAMIN-MINERALS) tablet, Take 1 tablet by mouth daily. , Disp: , Rfl:           Review of Systems:    Constitutional: Negative for fever, chills, and unexpected weight change. HENT: Negative for congestion, ear pain, rhinorrhea,  sore throat and trouble swallowing. Eyes: Negative for photophobia, redness, itching. Respiratory: Negative for cough, shortness of breath and sputum. Cardiovascular: Negative for chest pain, palpitations and leg swelling.

## 2017-12-13 DIAGNOSIS — M81.0 POSTMENOPAUSAL OSTEOPOROSIS: ICD-10-CM

## 2017-12-13 DIAGNOSIS — Z92.29 HISTORY OF BISPHOSPHONATE THERAPY: ICD-10-CM

## 2017-12-15 LAB
CREATININE URINE: 111 MG/DL
N-TELOPEPTIDE, CROSS-LINKED, URINE: 31

## 2017-12-18 ENCOUNTER — TELEPHONE (OUTPATIENT)
Dept: ENDOCRINOLOGY | Age: 57
End: 2017-12-18

## 2017-12-27 ENCOUNTER — OFFICE VISIT (OUTPATIENT)
Dept: PRIMARY CARE CLINIC | Age: 57
End: 2017-12-27

## 2017-12-27 VITALS
BODY MASS INDEX: 26.78 KG/M2 | RESPIRATION RATE: 18 BRPM | WEIGHT: 203 LBS | HEART RATE: 82 BPM | DIASTOLIC BLOOD PRESSURE: 69 MMHG | TEMPERATURE: 97.4 F | SYSTOLIC BLOOD PRESSURE: 117 MMHG

## 2017-12-27 DIAGNOSIS — Z83.3 FAMILY HISTORY OF TYPE 2 DIABETES MELLITUS IN FATHER: ICD-10-CM

## 2017-12-27 DIAGNOSIS — G62.9 NEUROPATHY: ICD-10-CM

## 2017-12-27 DIAGNOSIS — B35.1 NAIL FUNGUS: ICD-10-CM

## 2017-12-27 DIAGNOSIS — Z23 NEED FOR VACCINATION AGAINST STREPTOCOCCUS PNEUMONIAE: ICD-10-CM

## 2017-12-27 DIAGNOSIS — E78.2 MIXED HYPERLIPIDEMIA: ICD-10-CM

## 2017-12-27 DIAGNOSIS — K21.00 GASTROESOPHAGEAL REFLUX DISEASE WITH ESOPHAGITIS: Primary | ICD-10-CM

## 2017-12-27 DIAGNOSIS — R07.81 RIB PAIN: ICD-10-CM

## 2017-12-27 DIAGNOSIS — M79.7 FIBROMYALGIA: ICD-10-CM

## 2017-12-27 DIAGNOSIS — J45.991 COUGH VARIANT ASTHMA: ICD-10-CM

## 2017-12-27 PROCEDURE — 90670 PCV13 VACCINE IM: CPT | Performed by: INTERNAL MEDICINE

## 2017-12-27 PROCEDURE — 90471 IMMUNIZATION ADMIN: CPT | Performed by: INTERNAL MEDICINE

## 2017-12-27 PROCEDURE — 99214 OFFICE O/P EST MOD 30 MIN: CPT | Performed by: INTERNAL MEDICINE

## 2017-12-27 RX ORDER — SUCRALFATE 1 G/1
1 TABLET ORAL 4 TIMES DAILY
Qty: 120 TABLET | Refills: 5 | Status: SHIPPED | OUTPATIENT
Start: 2017-12-27 | End: 2019-06-19 | Stop reason: ALTCHOICE

## 2017-12-27 RX ORDER — TERBINAFINE HYDROCHLORIDE 250 MG/1
250 TABLET ORAL DAILY
Qty: 42 TABLET | Refills: 0 | Status: SHIPPED | OUTPATIENT
Start: 2017-12-27 | End: 2018-02-07

## 2017-12-27 RX ORDER — OSELTAMIVIR PHOSPHATE 75 MG/1
75 CAPSULE ORAL 2 TIMES DAILY
Qty: 20 CAPSULE | Refills: 0 | Status: SHIPPED | OUTPATIENT
Start: 2017-12-27 | End: 2018-01-06

## 2017-12-27 ASSESSMENT — ENCOUNTER SYMPTOMS
COUGH: 0
EYE DISCHARGE: 0
CHEST TIGHTNESS: 0
VOICE CHANGE: 0
STRIDOR: 0
SINUS PRESSURE: 0
ABDOMINAL DISTENTION: 0
DIARRHEA: 0
EYE PAIN: 0
ABDOMINAL PAIN: 0
SORE THROAT: 0
RECTAL PAIN: 0
EYE REDNESS: 0
BACK PAIN: 0
CHOKING: 0
ANAL BLEEDING: 0
BLOOD IN STOOL: 0
NAUSEA: 0
RHINORRHEA: 0
CONSTIPATION: 0
PHOTOPHOBIA: 0
APNEA: 0
VOMITING: 0
WHEEZING: 0
TROUBLE SWALLOWING: 0
EYE ITCHING: 0

## 2017-12-27 NOTE — PATIENT INSTRUCTIONS
Patient Education          pneumococcal 13-valent conjugate vaccine  Pronunciation:  RODY marla CAESAR al 13-VAY lent YOSSI rios VAX een  Brand:  Prevnar 15  What is the most important information I should know about this vaccine? For children, the pneumococcal 13-valent vaccine is given in a series of shots. The first shot is usually given when the child is 3 months old. The booster shots are then given at 4 months, 6 months, and 15to 13months of age. Adults usually receive only one dose of the vaccine. In a child older than 6 months who has not yet received this vaccine, the first dose can be given any time from the age of 10 months through 11 years (before the 7th birthday). If the child is less than 3year old at the time of the first shot, he or she will need 2 booster doses. If the child is 15 to 22 months old at the time of the first shot, he or she will need 1 booster dose. A child who is 2 years or older at the time of the first shot may need only the one shot and no booster doses. The timing of this vaccination is very important for it to be effective. Your child's individual booster schedule may be different from these guidelines. Follow your doctor's instructions or the schedule recommended by the health department of the state you live in. Keep track of any and all side effects your child has after receiving this vaccine. When the child receives a booster dose, you will need to tell the doctor if the previous shot caused any side effects. You can still receive a vaccine if you have a minor cold. In the case of a more severe illness with a fever or any type of infection, wait until you get better before receiving this vaccine. Becoming infected with pneumococcal disease (such as pneumonia or meningitis) is much more dangerous to your health than receiving this vaccine. However, like any medicine, this vaccine can cause side effects but the risk of serious side effects is extremely low.   Be sure to or bleeding; or  · severe pain, itching, irritation, or skin changes where the shot was given. Less serious side effects include  · crying, fussiness;  · headache, tired feeling;  · muscle or joint pain;  · drowsiness, sleeping more or less than usual;  · mild redness, swelling, tenderness, or a hard lump where the shot was given;  · loss of appetite, mild vomiting or diarrhea;  · low fever (102 degrees or less), chills; or  · mild skin rash. This is not a complete list of side effects and others may occur. Call your doctor for medical advice about side effects. You may report vaccine side effects to the [address] [address] and Human Services at [phone]. What other drugs will affect this vaccine? Before receiving this vaccine, tell the doctor about all other vaccines you or your child have recently received. Also tell the doctor if you or your child have recently received drugs or treatments that can weaken the immune system, including:  · an oral, nasal, inhaled, or injectable steroid medicine;  · chemotherapy or radiation;  · medications to treat psoriasis, rheumatoid arthritis, or other autoimmune disorders, such as azathioprine (Imuran), etanercept (Enbrel), leflunomide ([address]), and others; or  · medicines to treat or prevent organ transplant rejection, such as basiliximab (Simulect), cyclosporine (Sandimmune, Neoral, Gengraf), muromonab CD3 (Orthoclone), mycophenolate mofetil (CellCept), sirolimus (Rapamune), or tacrolimus (Prograf). If you are using any of these medications, you may not be able to receive the vaccine, or may need to wait until the other treatments are finished. There may be other drugs that can interact with pneumococcal 13-valent vaccine. Tell your doctor about all medications you use. This includes prescription, over-the-counter, vitamin, and herbal products. Do not start a new medication without telling your doctor. Where can I get more information?   Your doctor or pharmacist can provide more information about this vaccine. Additional information is available from your local health department or the Centers for Disease Control and Prevention. Remember, keep this and all other medicines out of the reach of children, never share your medicines with others, and use this medication only for the indication prescribed. Every effort has been made to ensure that the information provided by [address] [place] [address]  is accurate, up-to-date, and complete, but no guarantee is made to that effect. Drug information contained herein may be time sensitive. [facility] information has been compiled for use by healthcare practitioners and consumers in the [place] and therefore [facility] does not warrant that uses outside of the [place] are appropriate, unless specifically indicated otherwise. [facility]'s drug information does not endorse drugs, diagnose patients or recommend therapy. [facility]'s drug information is an informational resource designed to assist licensed healthcare practitioners in caring for their patients and/or to serve consumers viewing this service as a supplement to, and not a substitute for, the expertise, skill, knowledge and judgment of healthcare practitioners. The absence of a warning for a given drug or drug combination in no way should be construed to indicate that the drug or drug combination is safe, effective or appropriate for any given patient. [facility] does not assume any responsibility for any aspect of healthcare administered with the aid of information [facility] provides. The information contained herein is not intended to cover all possible uses, directions, precautions, warnings, drug interactions, allergic reactions, or adverse effects. If you have questions about the drugs you are taking, check with your doctor, nurse or pharmacist.  Copyright 0121-1910 [address]: 4.01. Revision date: 1/16/2012.   Care instructions adapted under

## 2017-12-27 NOTE — PROGRESS NOTES
Subjective:      Patient ID: Zohra Najera is a 61 y.o. female. HPI  Patient presents for evaluation of the followin. Gastroesophageal reflux disease with esophagitis Recently not controlled with pantoprazole 40 mg twice a day. Express concern over long-term use of pantoprazole with potential to thin bones and lower magnesium levels. We'll need to check magnesium level today. Patient had a recent EGD within the past 6 months. We'll also need to monitor renal function. Will need add-on therapy to pantoprazole and hopefully if this is helpful reduce dosage of pantoprazole. Also need to screening stool for blood see if another EGD is needed. 2. Rib pain With history of osteoporosis and multiple rib fractures from coughing in the past.  New onset lower left rib pain after previous rib fracture healed without fall or trauma. Will need x-ray to see if new fracture. 3. Need for vaccination against Streptococcus pneumoniae Patient had Pneumovax 23 a year ago. 4. Family history of type 2 diabetes mellitus in father With fatigue screening for diabetes. 5. Cough variant asthma Doing well on Symbicort and hoarseness has resolved and cough has resolved. 6. Mixed hyperlipidemia   Lab Results   Component Value Date    CHOL 171 2017    CHOL 188 2013    CHOL 198 2013     Lab Results   Component Value Date    TRIG 66 2017    TRIG 76 2013    TRIG 92 2013     Lab Results   Component Value Date    HDL 73 (H) 2017    HDL 61 (H) 2013    HDL 65 (H) 2013     Lab Results   Component Value Date    LDLCALC 85 2017    LDLCALC 112 (H) 2013    LDLCALC 115 (H) 2013     Lab Results   Component Value Date    LABVLDL 13 2017    LABVLDL 15 2013    LABVLDL 18 2013     No results found for: CHOLHDLRATIO controlled with pravastatin 40 mg nightly. No myalgias or weakness.       7. Neuropathy ([address] Out with good pain tightness, wheezing and stridor. MAGGIE on bi pap and followed by [facility] for one year. Able to walk up a flight a stairs. Asthma stable on Symbicort. Chest xray on 9/15/17 shows healing of ribs slowly. Cardiovascular: Negative for chest pain and leg swelling. Pacemaker 1998 and replaced 2007, and 16.  [facility] manages by Dr. Magan Pickens. Rib fracture pain is slowly resolving. Gastrointestinal: Negative for abdominal distention, abdominal pain, anal bleeding, blood in stool, constipation, diarrhea, nausea, rectal pain and vomiting. Ventra hernia ruq and pain with lifting. Patient has pain with lifting. GERD not relieved on protonix bid. NO dysphagia. Endocrine:        Prediabetes history. Genitourinary: Negative for decreased urine volume, dysuria, genital sores, hematuria, menstrual problem, pelvic pain, urgency, vaginal bleeding and vaginal discharge. Menarche at age 12. Age of first child age 29      No breast feeding. Paternal aunt with breast cancer at age 27. Maternal aunt and maternal cousin with ovarian cancer. Saw GYN,  Dr. Cheryl Santos in 2016. Mammogram in 2016. Menopause at age 36. History of uterine dysplasia and had endometrial biopsy. Musculoskeletal: Negative for back pain. 10/18/17 ortho xrayed shoulder and no OA just tendonitis cortisone shots in both shoulders and left hip. Pain resolved in all areas except the right shoulder. Skin: Negative. Status post removal of fungal great nail on 17 and is healing without problems and is completing a course of lamisil. Allergic/Immunologic: Positive for environmental allergies. Negative for food allergies and immunocompromised state. Neurological: Negative for dizziness, tremors and syncope. Bilateral toe pain and balls of feet for past two day. Chronic low back pain and scoliosis.    Physical Therapy started tenderness. Pain in right shoulder ,    With range of motion. Lymphadenopathy:     She has no cervical adenopathy. Neurological: She is alert and oriented to person, place, and time. She has normal reflexes. No cranial nerve deficit. She exhibits normal muscle tone. Coordination normal.   Skin: Skin is warm and dry. No rash noted. She is not diaphoretic. No erythema. No pallor. Nail growing in better but still fungus in the new nail. Psychiatric: She has a normal mood and affect. Her behavior is normal. Judgment and thought content normal.       Assessment:      1. Gastroesophageal reflux disease with esophagitis Add Carafate 1 g before meals and at bedtime and if this works try to reduce Protonix to daily. Ultimate goal is to taper off Protonix and replaced with ranitidine. BLOOD OCCULT STOOL #1, Monitor renal function and magnesium level. MAGNESIUM   2. Rib pain , Left lower thigh will x-ray. XR RIBS LEFT (2 VIEWS)    CBC Auto Differential   3. Need for vaccination against Streptococcus pneumoniae We'll screen patient,  Pneumococcal conjugate vaccine 13-valent IM (PREVNAR 13)   4. Family history of type 2 diabetes mellitus in father  Hemoglobin A1C   5. Cough variant asthma Controlled with Symbicort continue. 6. Mixed hyperlipidemia Monitor labs on pravastatin. Lipid Panel   7. Neuropathy (HCC) Good symptom control continue gabapentin and Cymbalta. 8. Fibromyalgia Good control symptoms with gabapentin and Cymbalta, continue monitor electrolytes. Comprehensive Metabolic Panel    TSH WITH REFLEX TO FT4   9. Nail fungus Will retreat. terbinafine (LAMISIL) 250 MG tablet           Plan:      Bob received counseling on the following healthy behaviors: medication adherence    Patient given educational materials on after visit summary with diagnosis and treatment plan.     I have instructed Bob to complete a self tracking handout on Weights and instructed them to bring it

## 2017-12-29 DIAGNOSIS — Z83.3 FAMILY HISTORY OF TYPE 2 DIABETES MELLITUS IN FATHER: ICD-10-CM

## 2017-12-29 DIAGNOSIS — R07.81 RIB PAIN: ICD-10-CM

## 2017-12-29 DIAGNOSIS — K21.00 GASTROESOPHAGEAL REFLUX DISEASE WITH ESOPHAGITIS: ICD-10-CM

## 2017-12-29 DIAGNOSIS — M79.7 FIBROMYALGIA: ICD-10-CM

## 2017-12-29 DIAGNOSIS — E78.2 MIXED HYPERLIPIDEMIA: ICD-10-CM

## 2017-12-29 LAB
A/G RATIO: 1.6 (ref 1.1–2.2)
ALBUMIN SERPL-MCNC: 4.1 G/DL (ref 3.4–5)
ALP BLD-CCNC: 119 U/L (ref 40–129)
ALT SERPL-CCNC: 14 U/L (ref 10–40)
ANION GAP SERPL CALCULATED.3IONS-SCNC: 13 MMOL/L (ref 3–16)
AST SERPL-CCNC: 16 U/L (ref 15–37)
BASOPHILS ABSOLUTE: 0 K/UL (ref 0–0.2)
BASOPHILS RELATIVE PERCENT: 0.6 %
BILIRUB SERPL-MCNC: 0.4 MG/DL (ref 0–1)
BUN BLDV-MCNC: 21 MG/DL (ref 7–20)
CALCIUM SERPL-MCNC: 9.3 MG/DL (ref 8.3–10.6)
CHLORIDE BLD-SCNC: 104 MMOL/L (ref 99–110)
CHOLESTEROL, TOTAL: 178 MG/DL (ref 0–199)
CO2: 27 MMOL/L (ref 21–32)
CREAT SERPL-MCNC: 0.8 MG/DL (ref 0.6–1.1)
EOSINOPHILS ABSOLUTE: 0.1 K/UL (ref 0–0.6)
EOSINOPHILS RELATIVE PERCENT: 1.6 %
GFR AFRICAN AMERICAN: >60
GFR NON-AFRICAN AMERICAN: >60
GLOBULIN: 2.6 G/DL
GLUCOSE BLD-MCNC: 92 MG/DL (ref 70–99)
HCT VFR BLD CALC: 40 % (ref 36–48)
HDLC SERPL-MCNC: 70 MG/DL (ref 40–60)
HEMOGLOBIN: 13.1 G/DL (ref 12–16)
LDL CHOLESTEROL CALCULATED: 93 MG/DL
LYMPHOCYTES ABSOLUTE: 1.8 K/UL (ref 1–5.1)
LYMPHOCYTES RELATIVE PERCENT: 29.7 %
MAGNESIUM: 2.2 MG/DL (ref 1.8–2.4)
MCH RBC QN AUTO: 31 PG (ref 26–34)
MCHC RBC AUTO-ENTMCNC: 32.7 G/DL (ref 31–36)
MCV RBC AUTO: 94.8 FL (ref 80–100)
MONOCYTES ABSOLUTE: 0.3 K/UL (ref 0–1.3)
MONOCYTES RELATIVE PERCENT: 5.1 %
NEUTROPHILS ABSOLUTE: 3.9 K/UL (ref 1.7–7.7)
NEUTROPHILS RELATIVE PERCENT: 63 %
PDW BLD-RTO: 16.7 % (ref 12.4–15.4)
PLATELET # BLD: 274 K/UL (ref 135–450)
PMV BLD AUTO: 8.3 FL (ref 5–10.5)
POTASSIUM SERPL-SCNC: 4.8 MMOL/L (ref 3.5–5.1)
RBC # BLD: 4.22 M/UL (ref 4–5.2)
SODIUM BLD-SCNC: 144 MMOL/L (ref 136–145)
TOTAL PROTEIN: 6.7 G/DL (ref 6.4–8.2)
TRIGL SERPL-MCNC: 76 MG/DL (ref 0–150)
TSH REFLEX FT4: 1.11 UIU/ML (ref 0.27–4.2)
VLDLC SERPL CALC-MCNC: 15 MG/DL
WBC # BLD: 6.1 K/UL (ref 4–11)

## 2017-12-30 LAB
ESTIMATED AVERAGE GLUCOSE: 108.3 MG/DL
HBA1C MFR BLD: 5.4 %

## 2018-01-14 DIAGNOSIS — E78.00 HYPERCHOLESTEROLEMIA: ICD-10-CM

## 2018-01-15 RX ORDER — PRAVASTATIN SODIUM 40 MG
TABLET ORAL
Qty: 30 TABLET | Refills: 2 | Status: SHIPPED | OUTPATIENT
Start: 2018-01-15 | End: 2018-08-09 | Stop reason: SDUPTHER

## 2018-04-23 ENCOUNTER — TELEPHONE (OUTPATIENT)
Dept: ENT CLINIC | Age: 58
End: 2018-04-23

## 2018-05-18 DIAGNOSIS — E55.9 VITAMIN D DEFICIENCY: ICD-10-CM

## 2018-05-19 DIAGNOSIS — E55.9 VITAMIN D DEFICIENCY: Primary | ICD-10-CM

## 2018-05-19 RX ORDER — ERGOCALCIFEROL 1.25 MG/1
CAPSULE ORAL
Qty: 4 CAPSULE | Refills: 3 | Status: SHIPPED | OUTPATIENT
Start: 2018-05-19 | End: 2018-09-16 | Stop reason: SDUPTHER

## 2018-06-09 DIAGNOSIS — M79.2 NEUROPATHIC PAIN: ICD-10-CM

## 2018-06-12 RX ORDER — DULOXETIN HYDROCHLORIDE 60 MG/1
CAPSULE, DELAYED RELEASE ORAL
Qty: 30 CAPSULE | Refills: 4 | Status: SHIPPED | OUTPATIENT
Start: 2018-06-12 | End: 2018-11-15 | Stop reason: SDUPTHER

## 2018-09-16 DIAGNOSIS — E55.9 VITAMIN D DEFICIENCY: ICD-10-CM

## 2018-09-18 RX ORDER — ERGOCALCIFEROL 1.25 MG/1
CAPSULE ORAL
Qty: 4 CAPSULE | Refills: 2 | Status: SHIPPED | OUTPATIENT
Start: 2018-09-18 | End: 2018-12-14 | Stop reason: SDUPTHER

## 2018-11-02 VITALS
RESPIRATION RATE: 13 BRPM | DIASTOLIC BLOOD PRESSURE: 74 MMHG | HEART RATE: 70 BPM | HEIGHT: 66 IN | WEIGHT: 132 LBS | SYSTOLIC BLOOD PRESSURE: 112 MMHG | BODY MASS INDEX: 21.21 KG/M2

## 2018-11-15 DIAGNOSIS — M79.2 NEUROPATHIC PAIN: ICD-10-CM

## 2018-11-16 RX ORDER — DULOXETIN HYDROCHLORIDE 60 MG/1
CAPSULE, DELAYED RELEASE ORAL
Qty: 30 CAPSULE | Refills: 3 | Status: SHIPPED | OUTPATIENT
Start: 2018-11-16 | End: 2019-03-17 | Stop reason: SDUPTHER

## 2018-11-28 ENCOUNTER — TELEPHONE (OUTPATIENT)
Dept: PRIMARY CARE CLINIC | Age: 58
End: 2018-11-28

## 2018-11-28 NOTE — TELEPHONE ENCOUNTER
Patient is starting to get acid reflux again. Not feeling well and choking on water sometimes. She was sick last year and would like to get in with Dr. Mg Bernal.  cb pt

## 2018-12-14 DIAGNOSIS — E55.9 VITAMIN D DEFICIENCY: ICD-10-CM

## 2018-12-14 RX ORDER — ERGOCALCIFEROL 1.25 MG/1
CAPSULE ORAL
Qty: 4 CAPSULE | Refills: 1 | Status: SHIPPED | OUTPATIENT
Start: 2018-12-14 | End: 2019-02-11 | Stop reason: SDUPTHER

## 2019-01-01 ENCOUNTER — EXTERNAL RECORD (OUTPATIENT)
Dept: HEALTH INFORMATION MANAGEMENT | Facility: OTHER | Age: 59
End: 2019-01-01

## 2019-02-11 DIAGNOSIS — E55.9 VITAMIN D DEFICIENCY: ICD-10-CM

## 2019-02-11 RX ORDER — ERGOCALCIFEROL 1.25 MG/1
CAPSULE ORAL
Qty: 4 CAPSULE | Refills: 0 | Status: SHIPPED | OUTPATIENT
Start: 2019-02-11 | End: 2019-02-12

## 2019-02-12 RX ORDER — ERGOCALCIFEROL 1.25 MG/1
50000 CAPSULE ORAL WEEKLY
Qty: 4 CAPSULE | Refills: 5 | Status: SHIPPED | OUTPATIENT
Start: 2019-02-12 | End: 2019-10-04 | Stop reason: SDUPTHER

## 2019-02-13 DIAGNOSIS — E78.00 HYPERCHOLESTEROLEMIA: ICD-10-CM

## 2019-02-13 RX ORDER — PRAVASTATIN SODIUM 40 MG
TABLET ORAL
Qty: 30 TABLET | Refills: 4 | Status: SHIPPED | OUTPATIENT
Start: 2019-02-13 | End: 2019-07-18 | Stop reason: SDUPTHER

## 2019-03-04 ENCOUNTER — OFFICE VISIT (OUTPATIENT)
Dept: PRIMARY CARE CLINIC | Age: 59
End: 2019-03-04
Payer: COMMERCIAL

## 2019-03-04 VITALS
DIASTOLIC BLOOD PRESSURE: 71 MMHG | BODY MASS INDEX: 29.93 KG/M2 | RESPIRATION RATE: 18 BRPM | SYSTOLIC BLOOD PRESSURE: 120 MMHG | TEMPERATURE: 97.9 F | HEART RATE: 86 BPM | WEIGHT: 221 LBS | OXYGEN SATURATION: 97 % | HEIGHT: 72 IN

## 2019-03-04 DIAGNOSIS — R13.19 ESOPHAGEAL DYSPHAGIA: ICD-10-CM

## 2019-03-04 DIAGNOSIS — H92.02 LEFT EAR PAIN: ICD-10-CM

## 2019-03-04 DIAGNOSIS — E55.9 VITAMIN D DEFICIENCY: ICD-10-CM

## 2019-03-04 DIAGNOSIS — Z83.3 FAMILY HISTORY OF TYPE 2 DIABETES MELLITUS IN FATHER: ICD-10-CM

## 2019-03-04 DIAGNOSIS — E78.2 MIXED HYPERLIPIDEMIA: ICD-10-CM

## 2019-03-04 DIAGNOSIS — K21.00 GASTROESOPHAGEAL REFLUX DISEASE WITH ESOPHAGITIS: ICD-10-CM

## 2019-03-04 DIAGNOSIS — E53.8 VITAMIN B 12 DEFICIENCY: ICD-10-CM

## 2019-03-04 DIAGNOSIS — M85.80 OSTEOPENIA, UNSPECIFIED LOCATION: ICD-10-CM

## 2019-03-04 DIAGNOSIS — M25.561 ACUTE PAIN OF RIGHT KNEE: Primary | ICD-10-CM

## 2019-03-04 PROCEDURE — 99214 OFFICE O/P EST MOD 30 MIN: CPT | Performed by: INTERNAL MEDICINE

## 2019-03-04 RX ORDER — CEFUROXIME AXETIL 500 MG/1
500 TABLET ORAL 2 TIMES DAILY
Qty: 14 TABLET | Refills: 0 | Status: SHIPPED | OUTPATIENT
Start: 2019-03-04 | End: 2019-03-11

## 2019-03-04 ASSESSMENT — ENCOUNTER SYMPTOMS
HEARTBURN: 1
RECTAL PAIN: 0
BLOOD IN STOOL: 0
CHEST TIGHTNESS: 0
DIARRHEA: 0
RHINORRHEA: 0
EYE ITCHING: 0
STRIDOR: 0
SORE THROAT: 1
VOICE CHANGE: 0
NAUSEA: 0
SINUS PRESSURE: 0
BELCHING: 0
EYE PAIN: 0
WHEEZING: 0
CONSTIPATION: 0
ABDOMINAL PAIN: 0
VOMITING: 0
COUGH: 1
EYE REDNESS: 0
ANAL BLEEDING: 0
TROUBLE SWALLOWING: 0
EYE DISCHARGE: 0
ABDOMINAL DISTENTION: 0
APNEA: 0
BACK PAIN: 0
PHOTOPHOBIA: 0
WATER BRASH: 1
CHOKING: 1

## 2019-03-04 ASSESSMENT — PATIENT HEALTH QUESTIONNAIRE - PHQ9
SUM OF ALL RESPONSES TO PHQ QUESTIONS 1-9: 0
SUM OF ALL RESPONSES TO PHQ9 QUESTIONS 1 & 2: 0
2. FEELING DOWN, DEPRESSED OR HOPELESS: 0
1. LITTLE INTEREST OR PLEASURE IN DOING THINGS: 0
SUM OF ALL RESPONSES TO PHQ QUESTIONS 1-9: 0

## 2019-03-05 DIAGNOSIS — Z83.3 FAMILY HISTORY OF TYPE 2 DIABETES MELLITUS IN FATHER: ICD-10-CM

## 2019-03-05 DIAGNOSIS — E78.2 MIXED HYPERLIPIDEMIA: ICD-10-CM

## 2019-03-05 DIAGNOSIS — M81.0 POSTMENOPAUSAL OSTEOPOROSIS: ICD-10-CM

## 2019-03-05 DIAGNOSIS — K21.00 GASTROESOPHAGEAL REFLUX DISEASE WITH ESOPHAGITIS: ICD-10-CM

## 2019-03-05 DIAGNOSIS — E55.9 VITAMIN D DEFICIENCY: ICD-10-CM

## 2019-03-05 DIAGNOSIS — E53.8 VITAMIN B 12 DEFICIENCY: ICD-10-CM

## 2019-03-05 DIAGNOSIS — Z92.29 HISTORY OF BISPHOSPHONATE THERAPY: ICD-10-CM

## 2019-03-05 LAB
A/G RATIO: 1.6 (ref 1.1–2.2)
ALBUMIN SERPL-MCNC: 4.1 G/DL (ref 3.4–5)
ALP BLD-CCNC: 124 U/L (ref 40–129)
ALT SERPL-CCNC: 13 U/L (ref 10–40)
ANION GAP SERPL CALCULATED.3IONS-SCNC: 13 MMOL/L (ref 3–16)
AST SERPL-CCNC: 15 U/L (ref 15–37)
BASOPHILS ABSOLUTE: 0 K/UL (ref 0–0.2)
BASOPHILS RELATIVE PERCENT: 0.8 %
BILIRUB SERPL-MCNC: 0.6 MG/DL (ref 0–1)
BILIRUBIN URINE: NEGATIVE
BLOOD, URINE: NEGATIVE
BUN BLDV-MCNC: 17 MG/DL (ref 7–20)
CALCIUM SERPL-MCNC: 9.4 MG/DL (ref 8.3–10.6)
CHLORIDE BLD-SCNC: 104 MMOL/L (ref 99–110)
CHOLESTEROL, TOTAL: 159 MG/DL (ref 0–199)
CLARITY: ABNORMAL
CO2: 26 MMOL/L (ref 21–32)
COLOR: ABNORMAL
CREAT SERPL-MCNC: 0.8 MG/DL (ref 0.6–1.1)
EOSINOPHILS ABSOLUTE: 0.2 K/UL (ref 0–0.6)
EOSINOPHILS RELATIVE PERCENT: 3.5 %
EPITHELIAL CELLS, UA: 5 /HPF (ref 0–5)
GFR AFRICAN AMERICAN: >60
GFR NON-AFRICAN AMERICAN: >60
GLOBULIN: 2.5 G/DL
GLUCOSE BLD-MCNC: 91 MG/DL (ref 70–99)
GLUCOSE URINE: NEGATIVE MG/DL
HCT VFR BLD CALC: 39.2 % (ref 36–48)
HDLC SERPL-MCNC: 50 MG/DL (ref 40–60)
HEMOGLOBIN: 12.9 G/DL (ref 12–16)
HYALINE CASTS: 14 /LPF (ref 0–8)
KETONES, URINE: NEGATIVE MG/DL
LDL CHOLESTEROL CALCULATED: 94 MG/DL
LEUKOCYTE ESTERASE, URINE: ABNORMAL
LYMPHOCYTES ABSOLUTE: 1.6 K/UL (ref 1–5.1)
LYMPHOCYTES RELATIVE PERCENT: 29.9 %
MCH RBC QN AUTO: 29.5 PG (ref 26–34)
MCHC RBC AUTO-ENTMCNC: 32.8 G/DL (ref 31–36)
MCV RBC AUTO: 90 FL (ref 80–100)
MICROSCOPIC EXAMINATION: YES
MONOCYTES ABSOLUTE: 0.3 K/UL (ref 0–1.3)
MONOCYTES RELATIVE PERCENT: 5.2 %
NEUTROPHILS ABSOLUTE: 3.3 K/UL (ref 1.7–7.7)
NEUTROPHILS RELATIVE PERCENT: 60.6 %
NITRITE, URINE: NEGATIVE
PDW BLD-RTO: 14.9 % (ref 12.4–15.4)
PH UA: 6 (ref 5–8)
PLATELET # BLD: 307 K/UL (ref 135–450)
PMV BLD AUTO: 8.5 FL (ref 5–10.5)
POTASSIUM SERPL-SCNC: 4.3 MMOL/L (ref 3.5–5.1)
PROTEIN UA: ABNORMAL MG/DL
RBC # BLD: 4.36 M/UL (ref 4–5.2)
RBC UA: 3 /HPF (ref 0–4)
SODIUM BLD-SCNC: 143 MMOL/L (ref 136–145)
SPECIFIC GRAVITY UA: 1.02 (ref 1–1.03)
T4 TOTAL: 7.2 UG/DL (ref 4.5–10.9)
TOTAL PROTEIN: 6.6 G/DL (ref 6.4–8.2)
TRIGL SERPL-MCNC: 74 MG/DL (ref 0–150)
TSH REFLEX FT4: 1.63 UIU/ML (ref 0.27–4.2)
URINE TYPE: ABNORMAL
UROBILINOGEN, URINE: 0.2 E.U./DL
VITAMIN B-12: 770 PG/ML (ref 211–911)
VITAMIN D 25-HYDROXY: 45.7 NG/ML
VLDLC SERPL CALC-MCNC: 15 MG/DL
WBC # BLD: 5.4 K/UL (ref 4–11)
WBC UA: 33 /HPF (ref 0–5)

## 2019-03-06 LAB
ESTIMATED AVERAGE GLUCOSE: 114 MG/DL
HBA1C MFR BLD: 5.6 %

## 2019-03-07 ENCOUNTER — HOSPITAL ENCOUNTER (OUTPATIENT)
Dept: GENERAL RADIOLOGY | Age: 59
Discharge: HOME | End: 2019-03-07
Payer: COMMERCIAL

## 2019-03-07 ENCOUNTER — HOSPITAL ENCOUNTER (OUTPATIENT)
Age: 59
Discharge: HOME | End: 2019-03-07
Payer: COMMERCIAL

## 2019-03-07 DIAGNOSIS — M25.561 ACUTE PAIN OF RIGHT KNEE: ICD-10-CM

## 2019-03-07 LAB
CREATININE URINE: 162 MG/DL
N-TELOPEPTIDE, CROSS-LINKED, URINE: 42

## 2019-03-07 PROCEDURE — 73560 X-RAY EXAM OF KNEE 1 OR 2: CPT

## 2019-03-11 ENCOUNTER — OFFICE VISIT (OUTPATIENT)
Dept: ENT CLINIC | Age: 59
End: 2019-03-11
Payer: COMMERCIAL

## 2019-03-11 VITALS
BODY MASS INDEX: 29.26 KG/M2 | DIASTOLIC BLOOD PRESSURE: 83 MMHG | WEIGHT: 216 LBS | HEART RATE: 79 BPM | SYSTOLIC BLOOD PRESSURE: 124 MMHG | HEIGHT: 72 IN

## 2019-03-11 DIAGNOSIS — H66.006 RECURRENT ACUTE SUPPURATIVE OTITIS MEDIA WITHOUT SPONTANEOUS RUPTURE OF TYMPANIC MEMBRANE OF BOTH SIDES: ICD-10-CM

## 2019-03-11 DIAGNOSIS — J01.90 ACUTE RHINOSINUSITIS: ICD-10-CM

## 2019-03-11 DIAGNOSIS — H66.001 ACUTE SUPPR OTITIS MEDIA W/O SPON RUPT EAR DRUM, RIGHT EAR: Primary | ICD-10-CM

## 2019-03-11 DIAGNOSIS — H60.332 ACUTE SWIMMER'S EAR OF LEFT SIDE: ICD-10-CM

## 2019-03-11 PROCEDURE — 99214 OFFICE O/P EST MOD 30 MIN: CPT | Performed by: OTOLARYNGOLOGY

## 2019-03-11 RX ORDER — AMOXICILLIN AND CLAVULANATE POTASSIUM 875; 125 MG/1; MG/1
1 TABLET, FILM COATED ORAL 2 TIMES DAILY
Qty: 20 TABLET | Refills: 0 | Status: SHIPPED | OUTPATIENT
Start: 2019-03-11 | End: 2019-03-21

## 2019-03-11 RX ORDER — CIPROFLOXACIN AND DEXAMETHASONE 3; 1 MG/ML; MG/ML
4 SUSPENSION/ DROPS AURICULAR (OTIC) 2 TIMES DAILY
Qty: 1 BOTTLE | Refills: 0 | Status: SHIPPED | OUTPATIENT
Start: 2019-03-11 | End: 2019-06-19 | Stop reason: ALTCHOICE

## 2019-03-11 RX ORDER — ACETAMINOPHEN, ASPIRIN AND CAFFEINE 250; 250; 65 MG/1; MG/1; MG/1
2 TABLET, FILM COATED ORAL PRN
Status: ON HOLD | COMMUNITY
End: 2019-08-11 | Stop reason: HOSPADM

## 2019-03-12 ENCOUNTER — TELEPHONE (OUTPATIENT)
Dept: ENT CLINIC | Age: 59
End: 2019-03-12

## 2019-03-14 ASSESSMENT — ENCOUNTER SYMPTOMS: SHORTNESS OF BREATH: 0

## 2019-03-17 DIAGNOSIS — M79.2 NEUROPATHIC PAIN: ICD-10-CM

## 2019-03-19 ENCOUNTER — OFFICE VISIT (OUTPATIENT)
Dept: ENT CLINIC | Age: 59
End: 2019-03-19
Payer: COMMERCIAL

## 2019-03-19 VITALS
SYSTOLIC BLOOD PRESSURE: 122 MMHG | HEIGHT: 72 IN | HEART RATE: 87 BPM | DIASTOLIC BLOOD PRESSURE: 79 MMHG | WEIGHT: 217 LBS | BODY MASS INDEX: 29.39 KG/M2

## 2019-03-19 DIAGNOSIS — M26.629 TMJ SYNDROME: ICD-10-CM

## 2019-03-19 DIAGNOSIS — H66.001 ACUTE SUPPR OTITIS MEDIA W/O SPON RUPT EAR DRUM, RIGHT EAR: Primary | ICD-10-CM

## 2019-03-19 DIAGNOSIS — H60.332 ACUTE SWIMMER'S EAR OF LEFT SIDE: ICD-10-CM

## 2019-03-19 PROBLEM — J01.90 ACUTE RHINOSINUSITIS: Status: ACTIVE | Noted: 2019-03-19

## 2019-03-19 PROCEDURE — 99213 OFFICE O/P EST LOW 20 MIN: CPT | Performed by: OTOLARYNGOLOGY

## 2019-03-19 RX ORDER — DULOXETIN HYDROCHLORIDE 60 MG/1
CAPSULE, DELAYED RELEASE ORAL
Qty: 30 CAPSULE | Refills: 2 | Status: SHIPPED | OUTPATIENT
Start: 2019-03-19 | End: 2019-06-18 | Stop reason: SDUPTHER

## 2019-03-19 NOTE — PROGRESS NOTES
[facility]        PCP:  Avery Melendrez MD      CHIEF COMPLAINT:  Chief Complaint   Patient presents with    Ear Problem       HISTORY OF PRESENT ILLNESS:   Caron Favre is a 60 y.o. female here for recheck and follow-up of right acute suppurative otitis media with rupture of tympanic membrane, and left external ear infection. Right ear still hurts but not as bad as it did. He had a fever on Tuesday the 12th, and once in the 13th, but this broke on Thursday. She was not able to go on the trip to work, which required air flight due to the ear infection. The left ear feels full. She is not able to get the drops into the ear. Sinuses still acting up. Cough sneezing and hoarseness. Left ear feels plugged up and still hurts but not as bad as it did. [employer] sinus headache and pain. REVIEW OF SYSTEMS:   EARS, NOSE, THROAT:  Denied otorrhea, otalgia, hearing loss, tinnitus, epistaxis, nasal dyspnea, rhinorrhea, sore throat and chronic hoarseness. EXAMINATION:      VITALS SIGNS reviewed. Vitals:    03/19/19 1626   BP: 122/79   Site: Left Upper Arm   Position: Sitting   Cuff Size: Large Adult   Pulse: 87   Weight: 217 lb (98.4 kg)   Height: 6' 1\" (1.854 m)      GENERAL APPEARANCE:  Well developed, well nourished, no apparent distress, no apparent deformities. (+) INSPECTION, HEAD AND FACE:  The temporomandibular joints were tender. Normal overall appearance, with no scars, lesions or masses. SALIVARY GLANDS:  The parotid, ptotic submandibular, and sublingual glands were normal bilaterally. EXTERNAL EAR/NOSE:  Normal pinnae and mastoids. Normal external nose. (+) EARS, OTOMICROSCOPY:  The left tympanic membrane was dull and thickened. There was a patent tympanostomy tube in place with a clear visualized lumen. The left tympanic membrane was non-mobile to pneumatic massage, consistent with a patent PE tube.   The right tympanic membrane was Non-erythematous, dull, and thickened with some thin areas. The right tympanic membrane was mobile to pneumatic massage, with some hypermobile thinner areas consistent with healed perforations.  (+) NOSE:  The nasal septum was mildly deviated to the left. There was mucoid nasal drainage bilaterally. The right inferior turbinate was enlarged. Left inferior turbinate was normal.  The nasal mucosa and secretions were normal.  No pus or polyps were seen.       (+) OROPHARYNX/ORAL CAVITY:  Post tonsillectomy (age 22). Oral mucosa, hard and soft palates, tongue, and pharynx were normal.      NECK:  No masses or tenderness. The laryngeal cartilages and hyoid bone were normal.  No abnormal appearance, asymmetry or abnormal tracheal position. PALPATION OF LYMPH NODES, CERVICAL, FACIAL AND SUPRACLAVICULAR:  No lymphadenopathy. Bruce Claire / Savannah Monae / Vaibhav Henao / PROCEDURES:       Baldemar Morales was seen today for ear problem. Diagnoses and all orders for this visit:    Acute suppr otitis media w/o spon rupt ear drum, right ear    Acute swimmer's ear of left side    TMJ syndrome         RECOMMENDATIONS / PLAN:    1. Advised Mrs. Sharif to stop the eardrops and continue and finish the oral antibiotics. 2. Letter to Mrs. Lopez Second regarding previous advised not to fly with an active ear infection. Patient Instructions   HOME INSTRUCTIONS FOR TMJ (TEMPOROMANDIBULAR JOINT DISORDER)  · Eat a soft diet, with no tough meat for ten days and then prn TMJ pain. · Do not chew gum. · Heating Pad Therapy:  Use a heating pad on medium heat to the TMJ area for about 30 to 45 minutes, with a one minute break every five minutes, three times daily. · Take Ibuprofen as recommended. · If ear pain remains uncontrolled, return to office, and we will consider other measures, including possible CT or MRI scans, endoscopy of the throat and possible biopsies, and/or referral to a dental TMJ specialist oral surgeon for bite splint. Follow-up  Return in about 6 months (around 9/19/2019) for recheck/follow-up, and sooner if condition worsens or fails to clear up with treatment.        >> Please note portions of this note were completed with a voice recognition program.  Efforts were made to edit the dictations but occasionally words are mis-transcribed.

## 2019-03-19 NOTE — PATIENT INSTRUCTIONS
HOME INSTRUCTIONS FOR TMJ (TEMPOROMANDIBULAR JOINT DISORDER)  · Eat a soft diet, with no tough meat for ten days and then prn TMJ pain. · Do not chew gum. · Heating Pad Therapy:  Use a heating pad on medium heat to the TMJ area for about 30 to 45 minutes, with a one minute break every five minutes, three times daily. · Take Ibuprofen as recommended. · If ear pain remains uncontrolled, return to office, and we will consider other measures, including possible CT or MRI scans, endoscopy of the throat and possible biopsies, and/or referral to a dental TMJ specialist oral surgeon for bite splint.

## 2019-06-18 NOTE — PROGRESS NOTES
Phone message left to call PAT dept at [phone]  for history review and surgery instructions.  6/18/19 3966

## 2019-06-19 ENCOUNTER — ANESTHESIA EVENT (OUTPATIENT)
Dept: ENDOSCOPY | Age: 59
End: 2019-06-19
Payer: COMMERCIAL

## 2019-06-19 NOTE — PROGRESS NOTES
[facility] time__0930_________        Surgery time___1100_________    Take the following medications with a sip of water: Follow your MD/Surgeons pre-procedure instructions regarding your medications    Do not eat or drink anything after 12:00 midnight prior to your surgery. This includes water chewing gum, mints and ice chips. You may brush your teeth and gargle the morning of your surgery, but do not swallow the water     Please see your family doctor/pediatrician for a history and physical and/or concerning medications. Bring any test results/reports from your physicians office. If you are under the care of a heart doctor or specialist doctor, please be aware that you may be asked to them for clearance    You may be asked to stop blood thinners such as Coumadin, Plavix, Fragmin, Lovenox, etc., or any anti-inflammatories such as:  Aspirin, Ibuprofen, Advil, Naproxen prior to your surgery. We also ask that you stop any OTC medications such as fish oil, vitamin E, glucosamine, garlic, Multivitamins, COQ 10, etc.    We ask that you do not smoke 24 hours prior to surgery  We ask that you do not  drink any alcoholic beverages 24 hours prior to surgery     You must make arrangements for a responsible adult to take you home after your surgery. For your safety you will not be allowed to leave alone or drive yourself home. Your surgery will be cancelled if you do not have a ride home. Also for your safety, it is strongly suggested that someone stay with you the first 24 hours after your surgery. A parent or legal guardian must accompany a child scheduled for surgery and plan to stay at the hospital until the child is discharged. Please do not bring other children with you. For your comfort, please wear simple loose fitting clothing to the hospital.  Please do not bring valuables.     Do not wear any make-up or nail polish on your fingers or

## 2019-06-20 ENCOUNTER — ANESTHESIA (OUTPATIENT)
Dept: ENDOSCOPY | Age: 59
End: 2019-06-20
Payer: COMMERCIAL

## 2019-06-20 ENCOUNTER — HOSPITAL ENCOUNTER (OUTPATIENT)
Age: 59
Setting detail: OUTPATIENT SURGERY
Discharge: HOME | End: 2019-06-20
Attending: INTERNAL MEDICINE | Admitting: INTERNAL MEDICINE
Payer: COMMERCIAL

## 2019-06-20 VITALS
DIASTOLIC BLOOD PRESSURE: 78 MMHG | BODY MASS INDEX: 29.8 KG/M2 | OXYGEN SATURATION: 98 % | HEART RATE: 72 BPM | TEMPERATURE: 97.6 F | WEIGHT: 220 LBS | SYSTOLIC BLOOD PRESSURE: 138 MMHG | RESPIRATION RATE: 18 BRPM | HEIGHT: 72 IN

## 2019-06-20 VITALS
SYSTOLIC BLOOD PRESSURE: 162 MMHG | RESPIRATION RATE: 12 BRPM | OXYGEN SATURATION: 81 % | DIASTOLIC BLOOD PRESSURE: 74 MMHG

## 2019-06-20 PROCEDURE — 2580000003 HC RX 258: Performed by: ANESTHESIOLOGY

## 2019-06-20 PROCEDURE — 3700000001 HC ADD 15 MINUTES (ANESTHESIA): Performed by: INTERNAL MEDICINE

## 2019-06-20 PROCEDURE — 7100000010 HC PHASE II RECOVERY - FIRST 15 MIN: Performed by: INTERNAL MEDICINE

## 2019-06-20 PROCEDURE — 7100000000 HC PACU RECOVERY - FIRST 15 MIN: Performed by: INTERNAL MEDICINE

## 2019-06-20 PROCEDURE — 3609015300 HC ESOPHAGEAL DILATION MALONEY: Performed by: INTERNAL MEDICINE

## 2019-06-20 PROCEDURE — 88312 SPECIAL STAINS GROUP 1: CPT

## 2019-06-20 PROCEDURE — 6360000002 HC RX W HCPCS: Performed by: ANESTHESIOLOGY

## 2019-06-20 PROCEDURE — 7100000001 HC PACU RECOVERY - ADDTL 15 MIN: Performed by: INTERNAL MEDICINE

## 2019-06-20 PROCEDURE — 3700000000 HC ANESTHESIA ATTENDED CARE: Performed by: INTERNAL MEDICINE

## 2019-06-20 PROCEDURE — 7100000011 HC PHASE II RECOVERY - ADDTL 15 MIN: Performed by: INTERNAL MEDICINE

## 2019-06-20 PROCEDURE — 88305 TISSUE EXAM BY PATHOLOGIST: CPT

## 2019-06-20 PROCEDURE — 2500000003 HC RX 250 WO HCPCS: Performed by: ANESTHESIOLOGY

## 2019-06-20 PROCEDURE — 2709999900 HC NON-CHARGEABLE SUPPLY: Performed by: INTERNAL MEDICINE

## 2019-06-20 PROCEDURE — 3609012400 HC EGD TRANSORAL BIOPSY SINGLE/MULTIPLE: Performed by: INTERNAL MEDICINE

## 2019-06-20 RX ORDER — PROPOFOL 10 MG/ML
INJECTION, EMULSION INTRAVENOUS PRN
Status: DISCONTINUED | OUTPATIENT
Start: 2019-06-20 | End: 2019-06-20 | Stop reason: SDUPTHER

## 2019-06-20 RX ORDER — SODIUM CHLORIDE 9 MG/ML
INJECTION, SOLUTION INTRAVENOUS CONTINUOUS
Status: DISCONTINUED | OUTPATIENT
Start: 2019-06-20 | End: 2019-06-20 | Stop reason: HOSPADM

## 2019-06-20 RX ORDER — SODIUM CHLORIDE 0.9 % (FLUSH) 0.9 %
10 SYRINGE (ML) INJECTION PRN
Status: DISCONTINUED | OUTPATIENT
Start: 2019-06-20 | End: 2019-06-20 | Stop reason: HOSPADM

## 2019-06-20 RX ORDER — SODIUM CHLORIDE 0.9 % (FLUSH) 0.9 %
10 SYRINGE (ML) INJECTION EVERY 12 HOURS SCHEDULED
Status: DISCONTINUED | OUTPATIENT
Start: 2019-06-20 | End: 2019-06-20 | Stop reason: HOSPADM

## 2019-06-20 RX ORDER — ONDANSETRON 2 MG/ML
4 INJECTION INTRAMUSCULAR; INTRAVENOUS
Status: DISCONTINUED | OUTPATIENT
Start: 2019-06-20 | End: 2019-06-20 | Stop reason: HOSPADM

## 2019-06-20 RX ORDER — APREPITANT 40 MG/1
40 CAPSULE ORAL ONCE
Status: COMPLETED | OUTPATIENT
Start: 2019-06-20 | End: 2019-06-20

## 2019-06-20 RX ORDER — LIDOCAINE HYDROCHLORIDE 10 MG/ML
1 INJECTION, SOLUTION EPIDURAL; INFILTRATION; INTRACAUDAL; PERINEURAL
Status: DISCONTINUED | OUTPATIENT
Start: 2019-06-20 | End: 2019-06-20 | Stop reason: HOSPADM

## 2019-06-20 RX ORDER — LIDOCAINE HYDROCHLORIDE 20 MG/ML
INJECTION, SOLUTION INFILTRATION; PERINEURAL PRN
Status: DISCONTINUED | OUTPATIENT
Start: 2019-06-20 | End: 2019-06-20 | Stop reason: SDUPTHER

## 2019-06-20 RX ORDER — PROPOFOL 10 MG/ML
INJECTION, EMULSION INTRAVENOUS CONTINUOUS PRN
Status: DISCONTINUED | OUTPATIENT
Start: 2019-06-20 | End: 2019-06-20 | Stop reason: SDUPTHER

## 2019-06-20 RX ADMIN — LIDOCAINE HYDROCHLORIDE 20 MG: 20 INJECTION, SOLUTION INFILTRATION; PERINEURAL at 11:13

## 2019-06-20 RX ADMIN — SODIUM CHLORIDE: 9 INJECTION, SOLUTION INTRAVENOUS at 11:09

## 2019-06-20 RX ADMIN — PROPOFOL 100 MCG/KG/MIN: 10 INJECTION, EMULSION INTRAVENOUS at 11:12

## 2019-06-20 RX ADMIN — SODIUM CHLORIDE: 9 INJECTION, SOLUTION INTRAVENOUS at 09:59

## 2019-06-20 RX ADMIN — PROPOFOL 80 MG: 10 INJECTION, EMULSION INTRAVENOUS at 11:14

## 2019-06-20 RX ADMIN — APREPITANT 40 MG: 40 CAPSULE ORAL at 10:23

## 2019-06-20 ASSESSMENT — PAIN SCALES - GENERAL
PAINLEVEL_OUTOF10: 0

## 2019-06-20 ASSESSMENT — PULMONARY FUNCTION TESTS
PIF_VALUE: 0

## 2019-06-20 ASSESSMENT — PAIN - FUNCTIONAL ASSESSMENT: PAIN_FUNCTIONAL_ASSESSMENT: 0-10

## 2019-06-20 NOTE — PROGRESS NOTES
To ACU room 4 by cart from PACU. VSS. Awake and talking. Denies pain. HOB elevated more. Sipping ice water. Gregg well.

## 2019-06-20 NOTE — ANESTHESIA PRE PROCEDURE
Department of Anesthesiology  Preprocedure Note       Name:  Denilson Butler   Age:  60 y.o.  :  1960                                          MRN:  [id number]         Date:  2019      Surgeon: Kenia Webber):  Ivania Washington MD    Procedure: ESOPHAGOGASTRODUODENOSCOPY (N/A )    Medications prior to admission:   Prior to Admission medications    Medication Sig Start Date End Date Taking? Authorizing Provider   DULoxetine (CYMBALTA) 60 MG extended release capsule TAKE ONE CAPSULE BY MOUTH DAILY   Yes Leona Jang MD   vitamin D (ERGOCALCIFEROL) 42284 units CAPS capsule Take 1 capsule by mouth once a week Vitamin D2 19  Yes Zully Figueredo MD   pantoprazole (PROTONIX) 40 MG tablet Take 1 tablet by mouth 2 times daily 17  Yes Ivania Washington MD   ondansetron [facility]) 4 MG tablet Take 1 tablet by mouth every 8 hours as needed for Nausea 3/30/17  Yes MAURO Chaparro   b complex vitamins capsule Take 1 capsule by mouth daily   Yes Historical Provider, MD   Multiple Vitamins-Minerals (THERAPEUTIC MULTIVITAMIN-MINERALS) tablet Take 1 tablet by mouth daily.    Yes Historical Provider, MD   aspirin-acetaminophen-caffeine (Noy Sails) 989-905-87 MG per tablet Take 2 tablets by mouth as needed     Historical Provider, MD   Pseudoephedrine-Acetaminophen (SINUS PAIN/PRESSURE PO) Take 2 tablets by mouth as needed     Historical Provider, MD   pravastatin (PRAVACHOL) 40 MG tablet TAKE ONE TABLET BY MOUTH DAILY  Patient taking differently: TAKE ONE TABLET BY MOUTH DAILY AT BEDTIME 19   Zully Figueredo MD   budesonide-formoterol (SYMBICORT) 160-4.5 MCG/ACT AERO Inhale 2 puffs into the lungs 2 times daily 17   Zully Figueredo MD   albuterol sulfate HFA (VENTOLIN HFA) 108 (90 Base) MCG/ACT inhaler Inhale 2 puffs into the lungs every 6 hours as needed for Wheezing (cough) 17   Zully Figueredo MD       Current medications:    No current facility-administered medications for this encounter. Current Outpatient Medications   Medication Sig Dispense Refill    DULoxetine (CYMBALTA) 60 MG extended release capsule TAKE ONE CAPSULE BY MOUTH DAILY 30 capsule 0    vitamin D (ERGOCALCIFEROL) 40189 units CAPS capsule Take 1 capsule by mouth once a week Vitamin D2 4 capsule 5    pantoprazole (PROTONIX) 40 MG tablet Take 1 tablet by mouth 2 times daily 60 tablet 3    ondansetron (ZOFRAN) 4 MG tablet Take 1 tablet by mouth every 8 hours as needed for Nausea 10 tablet 0    b complex vitamins capsule Take 1 capsule by mouth daily      Multiple Vitamins-Minerals (THERAPEUTIC MULTIVITAMIN-MINERALS) tablet Take 1 tablet by mouth daily.  aspirin-acetaminophen-caffeine (EXCEDRIN MIGRAINE) 250-250-65 MG per tablet Take 2 tablets by mouth as needed       Pseudoephedrine-Acetaminophen (SINUS PAIN/PRESSURE PO) Take 2 tablets by mouth as needed       pravastatin (PRAVACHOL) 40 MG tablet TAKE ONE TABLET BY MOUTH DAILY (Patient taking differently: TAKE ONE TABLET BY MOUTH DAILY AT BEDTIME) 30 tablet 4    budesonide-formoterol (SYMBICORT) 160-4.5 MCG/ACT AERO Inhale 2 puffs into the lungs 2 times daily 1 Inhaler 3    albuterol sulfate HFA (VENTOLIN HFA) 108 (90 Base) MCG/ACT inhaler Inhale 2 puffs into the lungs every 6 hours as needed for Wheezing (cough) 1 Inhaler 3       Allergies: Allergies   Allergen Reactions    Keflex [Cephalexin] Nausea And Vomiting     vomiting    Adhesive Tape Other (See Comments)     Red & painful-blisters-severe    Morphine Other (See Comments)     Hyper. Pt refuse       Problem List:    Patient Active Problem List   Diagnosis Code    Herpes zoster B02.9    Neuropathy G62.9    Sick sinus syndrome ([address] I49.5    Classic migraine G43. 109    Degenerated intervertebral disc [id number]    GERD (gastroesophageal reflux disease) K21.9    Rheumatoid arthritis of hand (HCC) M06.9    Scoliosis M41.9    Chondromalacia of patella M22.40    Primary localized osteoarthrosis, lower leg M17.10    Trochanteric bursitis of right hip M70.61    Right hip pain M25.551    Knee pain M25.569    Enchondroma of femur D16.20    Right ankle sprain S93.401A    Fibula fracture S82.409A    Family history of type 2 diabetes mellitus in father Z80.1    Skin cancer C44.90    Splenomegaly R16.1    Gastric ulcer K25.9    Primary osteoarthritis of both knees M17.0    Trochanteric bursitis, left hip M70.62    Cough variant asthma J45.991    Multiple fractures of ribs, bilateral, subsequent encounter for fracture with delayed healing S22.43XG    Trochanteric bursitis of left hip M70.62    Right rotator cuff tendonitis M75.81    Rotator cuff tendonitis, left M75.82    Postmenopausal osteoporosis M81.0    Multiple thyroid nodules E04.2    S/P partial thyroidectomy Z98.890    History of bisphosphonate therapy Z92.29    Mixed hyperlipidemia E78.2    Acute suppr otitis media w/o spon rupt ear drum, right ear H66.001    Acute swimmer's ear of left side H60.332    Recurrent acute suppurative otitis media without spontaneous rupture of tympanic membrane of both sides H66.006    Acute rhinosinusitis J01.90       Past Medical History:        Diagnosis Date    Bradycardia     Bronchitis     Cancer ([address] 2009    BCC Rt leg,squamous cell,LEFT LEG,FACE-BASAL CELL    Classic migraine     Degenerated intervertebral disc     GERD (gastroesophageal reflux disease)     Hx of blood clots     neck,arm after first pacemaker inserted    Hypercholesterolemia     IBS (irritable bowel syndrome)     Nausea & vomiting     Neuropathy     Osteoporosis     Peripheral vascular disease (HCC)     PONV (postoperative nausea and vomiting)     family hx also of post op n/v    Rheumatoid arthritis of the hand     left thumb MCP joint , left great toe MTP joint    Seizure ([address]     as a child due to hole in heart-last one 1961    Sick sinus syndrome ([address]     s/p pacemaker 2008    Sleep apnea     NO C-PAP    Thyroid nodule     benign s/p surgery-GOITER-REMOVED       Past Surgical History:        Procedure Laterality Date    CARDIAC SURGERY  01/1965    Congenital heart defect    COLONOSCOPY      SEVERAL    KNEE ARTHROSCOPY Right     torn meniscous    KNEE SURGERY Left 2010    NASAL FRACTURE SURGERY  1985    PACEMAKER PLACEMENT  1998 & 2007    Placement under stomach muscle    PACEMAKER PLACEMENT  2016    THYROIDECTOMY, PARTIAL Right 2011    TONSILLECTOMY AND ADENOIDECTOMY  1978    TYMPANOSTOMY TUBE PLACEMENT      UPPER GASTROINTESTINAL ENDOSCOPY  04/17/2017    UPPER GASTROINTESTINAL ENDOSCOPY  07/06/2017       Social History:    Social History     Tobacco Use    Smoking status: Never Smoker    Smokeless tobacco: Never Used   Substance Use Topics    Alcohol use: Yes     Comment: Ocassional-rare                                Counseling given: Not Answered      Vital Signs (Current):   Vitals:    06/19/19 0847   Weight: 220 lb (99.8 kg)   Height: 6' 1\" (1.854 m)                                              BP Readings from Last 3 Encounters:   03/19/19 122/79   03/11/19 124/83   03/04/19 120/71       NPO Status:                                                                                 BMI:   Wt Readings from Last 3 Encounters:   03/19/19 217 lb (98.4 kg)   03/11/19 216 lb (98 kg)   03/04/19 221 lb (100.2 kg)     Body mass index is 29.03 kg/m².     CBC:   Lab Results   Component Value Date    WBC 5.4 03/05/2019    RBC 4.36 03/05/2019    HGB 12.9 03/05/2019    HCT 39.2 03/05/2019    MCV 90.0 03/05/2019    RDW 14.9 03/05/2019     03/05/2019       CMP:   Lab Results   Component Value Date     03/05/2019    K 4.3 03/05/2019     03/05/2019    CO2 26 03/05/2019    BUN 17 03/05/2019    CREATININE 0.8 03/05/2019    GFRAA >60 03/05/2019    GFRAA >60 05/22/2013    AGRATIO 1.6 03/05/2019    LABGLOM >60 03/05/2019    GLUCOSE 91 03/05/2019    PROT 6.6 03/05/2019    PROT 8.0 10/16/2012    CALCIUM 9.4 03/05/2019    BILITOT 0.6 03/05/2019    ALKPHOS 124 03/05/2019    AST 15 03/05/2019    ALT 13 03/05/2019       POC Tests: No results for input(s): POCGLU, POCNA, POCK, POCCL, POCBUN, POCHEMO, POCHCT in the last 72 hours. Coags:   Lab Results   Component Value Date    PROTIME 10.7 01/11/2016    INR 0.94 01/11/2016    APTT 29.4 01/11/2016       HCG (If Applicable):   Lab Results   Component Value Date    PREGTESTUR Negative 03/30/2017        ABGs: No results found for: PHART, PO2ART, BGI1LFK, OED7VIA, BEART, B7HCQHDU     Type & Screen (If Applicable):  No results found for: LABABO, LABRH    Anesthesia Evaluation     history of anesthetic complications: PONV. Airway: Mallampati: II  TM distance: >3 FB   Neck ROM: full  Mouth opening: > = 3 FB Dental:          Pulmonary:   (+) sleep apnea: on noncompliant,  asthma:                            Cardiovascular:    (+) pacemaker:,                ROS comment: SSS-bradycardia,      Neuro/Psych:   (+) headaches:,             GI/Hepatic/Renal:   (+) GERD:, PUD,           Endo/Other:    (+) : arthritis:., .                 Abdominal:           Vascular:   + PVD, aortic or cerebral, . This pre-anesthesia assessment may be used as a history and physical.    DOS STAFF ADDENDUM:    Pt seen and examined, chart reviewed (including anesthesia, drug and allergy history). No interval changes to history and physical examination. Anesthetic plan, risks, benefits, alternatives, and personnel involved discussed with patient. Patient verbalized an understanding and agrees to proceed. Jocy Solorzano MD  June 20, 2019  7:29 AM       Anesthesia Plan      MAC     ASA 3       Induction: intravenous. Anesthetic plan and risks discussed with patient. Plan discussed with CRNA.     Attending anesthesiologist reviewed and agrees with Pre Eval content            Jocy Solorzano MD   6/20/2019

## 2019-06-20 NOTE — PROGRESS NOTES
Pt to pacu from endo. Pt asleep. Wakes easily. Denies pain. Abd soft. IV infusing. Monitor in a paced rhythm.

## 2019-06-20 NOTE — OP NOTE
[address] [place] [address]  Endoscopy Note    Patient: Paulina Wall  : 1960  Acct#:     Procedure: Esophagogastroduodenoscopy with biopsy  Alma Hernandez dilation    Date:  2019     Surgeon:  Derrick Serrano MD    Referring Physician:  Dr. Tori Castrejon    Indications: This is a 60y.o. year old female who presents today with GERD, dyspepsia, and dysphagia. EGD 2017 showed LA grade A reflux esophagitis, schatzki ring, 3 antral ulcers, and a 3cm sliding hiatal hernia. Biopsies negative for malignancy or H. PYlori. Savary dilation to 56 Fr led to mucosal tearing of the schatzki ring. Anesthesia:  TIVA    Description of Procedure:  Informed consent was obtained from the patient after explanation of indications, benefits and possible risks and complications of the procedure. The patient was then taken to the endoscopy suite, placed in the left lateral decubitus position and the above IV sedation was administrered. The Olympus videoendoscope was placed in the patient's mouth and under direct visualization passed into the esophagus and advanced without difficulty to the 2nd portion of the duodenum. Views were good, patient toleration was good. Retroflexion was performed in the stomach. Findings:  1. The esophagus appeared normal without evidence of Winston's esophagus or reflux esophagitis. 2.  Distal esophageal stricture heredia dilated at 46 Fr and 64 Fr without resistance and with mucosal tearing in the distal esophagus indicative of a successful dilation. 3.  Proximal esophagus was biopsied to evaluate for eosinophillic esophagitis. 4.  3 cm sliding hiatal hernia with free reflux of fluid into the esophagus. 5.  There was moderate antral gastritis. Biopsies were obtained from the antrum and body of the stomach to evaluate for H. Pylori. 6.  Normal duodenum. The scope was then withdrawn back into the stomach, it was decompressed, and the scope was completely withdrawn.     The patient

## 2019-06-20 NOTE — ANESTHESIA POSTPROCEDURE EVALUATION
Department of Anesthesiology  Postprocedure Note    Patient: Rod Elizabeth  MRN: [id number]  YOB: 1960  Date of evaluation: 6/20/2019  Time:  3:02 PM     Procedure Summary     Date:  06/20/19 Room / Location:  [facility] 04 / [facility] ENDOSCOPY    Anesthesia Start:  1109 Anesthesia Stop:  8167    Procedures:       EGD BIOPSY (N/A )      ESOPHAGEAL DILATION Edwinna Zack (N/A ) Diagnosis:  (DYSPHAGIA)    Surgeon:  Soraya Rdz MD Responsible Provider:  Alma Rosa Baumann MD    Anesthesia Type:  MAC ASA Status:  3          Anesthesia Type: MAC    Yaz Phase I: Yaz Score: 10    Yaz Phase II: Yaz Score: 10    Last vitals: Reviewed and per EMR flowsheets.        Anesthesia Post Evaluation    Patient location during evaluation: bedside  Patient participation: complete - patient participated  Level of consciousness: awake  Pain score: 3  Airway patency: patent  Nausea & Vomiting: no nausea and no vomiting  Complications: no  Cardiovascular status: blood pressure returned to baseline  Respiratory status: acceptable  Hydration status: euvolemic negative

## 2019-06-20 NOTE — H&P
[address] [place] [address]   Pre-operative History and Physical    Patient: [facility]  : 1960  Acct#:     HISTORY OF PRESENT ILLNESS:    The patient is a 60 y.o. female who presents with EGD 2017 showed LA grade A reflux esophagitis, schatzki ring, 3 antral ulcers, and a 3cm sliding hiatal hernia.  Biopsies negative for malignancy or H. PYlori.  Savary dilation to 56 Fr led to mucosal tearing of the schatzki ring.         Past Medical History:        Diagnosis Date    Bradycardia     Bronchitis     Cancer ([address]     [address] Rt leg,squamous cell,LEFT LEG,FACE-BASAL CELL    Classic migraine     Degenerated intervertebral disc     GERD (gastroesophageal reflux disease)     Hx of blood clots     neck,arm after first pacemaker inserted    Hypercholesterolemia     IBS (irritable bowel syndrome)     Nausea & vomiting     Neuropathy     Osteoporosis     Peripheral vascular disease ([address]     PONV (postoperative nausea and vomiting)     family hx also of post op n/v    Rheumatoid arthritis of the hand     left thumb MCP joint , left great toe MTP joint    Seizure ([address]     as a child due to hole in heart-last one [address] sinus syndrome ([address]     s/p pacemaker     Sleep apnea     NO C-PAP    Thyroid nodule     benign s/p surgery-GOITER-REMOVED      Past Surgical History:        Procedure Laterality Date    CARDIAC SURGERY  1965    Congenital heart defect    COLONOSCOPY      SEVERAL    KNEE ARTHROSCOPY Right     torn meniscous    KNEE SURGERY Left 2010    NASAL FRACTURE SURGERY     [facility] & 2007    Placement under stomach muscle    PACEMAKER PLACEMENT  2016    THYROIDECTOMY, PARTIAL Right 2011    TONSILLECTOMY AND ADENOIDECTOMY      TYMPANOSTOMY TUBE PLACEMENT      UPPER GASTROINTESTINAL ENDOSCOPY  2017    UPPER GASTROINTESTINAL ENDOSCOPY  2017      Medications Prior to Admission:   No current facility-administered medications on file prior to encounter. Current Outpatient Medications on File Prior to Encounter   Medication Sig Dispense Refill    vitamin D (ERGOCALCIFEROL) 35707 units CAPS capsule Take 1 capsule by mouth once a week Vitamin D2 4 capsule 5    ondansetron (ZOFRAN) 4 MG tablet Take 1 tablet by mouth every 8 hours as needed for Nausea 10 tablet 0    b complex vitamins capsule Take 1 capsule by mouth daily      Multiple Vitamins-Minerals (THERAPEUTIC MULTIVITAMIN-MINERALS) tablet Take 1 tablet by mouth daily.  aspirin-acetaminophen-caffeine (EXCEDRIN MIGRAINE) 250-250-65 MG per tablet Take 2 tablets by mouth as needed       Pseudoephedrine-Acetaminophen (SINUS PAIN/PRESSURE PO) Take 2 tablets by mouth as needed       pravastatin (PRAVACHOL) 40 MG tablet TAKE ONE TABLET BY MOUTH DAILY (Patient taking differently: TAKE ONE TABLET BY MOUTH DAILY AT BEDTIME) 30 tablet 4    budesonide-formoterol (SYMBICORT) 160-4.5 MCG/ACT AERO Inhale 2 puffs into the lungs 2 times daily 1 Inhaler 3    albuterol sulfate HFA (VENTOLIN HFA) 108 (90 Base) MCG/ACT inhaler Inhale 2 puffs into the lungs every 6 hours as needed for Wheezing (cough) 1 Inhaler 3    pantoprazole (PROTONIX) 40 MG tablet Take 1 tablet by mouth 2 times daily 60 tablet 3        Allergies:  Keflex [cephalexin];  Adhesive tape; and Morphine    Social History:   Social History     Socioeconomic History    Marital status: Single     Spouse name: Not on file    Number of children: Not on file    Years of education: Not on file    Highest education level: Not on file   Occupational History    Not on file   Social Needs    Financial resource strain: Not on file    Food insecurity:     Worry: Not on file     Inability: Not on file    Transportation needs:     Medical: Not on file     Non-medical: Not on file   Tobacco Use    Smoking status: Never Smoker    Smokeless tobacco: Never Used   Substance and Sexual Activity    Alcohol use: Yes     Comment: Janell Kapoor Drug use: No    Sexual activity: Not Currently     Partners: Male   Lifestyle    Physical activity:     Days per week: Not on file     Minutes per session: Not on file    Stress: Not on file   Relationships    Social connections:     Talks on phone: Not on file     Gets together: Not on file     Attends [employer] service: Not on file     Active member of club or organization: Not on file     Attends meetings of clubs or organizations: Not on file     Relationship status: Not on file    Intimate partner violence:     Fear of current or ex partner: Not on file     Emotionally abused: Not on file     Physically abused: Not on file     Forced sexual activity: Not on file   Other Topics Concern    Not on file   Social History Narrative    Not on file      Family History:       Problem Relation Age of Onset    Rheum Arthritis Mother     Hypertension Mother     Osteoporosis Mother     Kidney Disease Mother     Cancer Father         lung    Diabetes Father     COPD Father     High Blood Pressure Sister     Migraines Sister     Osteoporosis Maternal Grandmother     Cancer Maternal Aunt     Cancer Paternal Aunt     Heart Attack Maternal Grandfather     Heart Disease Maternal Grandfather         PHYSICAL EXAM:      /76   Pulse 71   Temp 98.4 °F (36.9 °C) (Temporal)   Resp 16   Ht 6' 1\" (1.854 m)   Wt 220 lb (99.8 kg)   SpO2 95%   BMI 29.03 kg/m²  I        Heart:  RRR    Lungs:  CTA b    Abdomen:  S/NT/ND/+BS      ASSESSMENT AND PLAN:  ASA: per anesthesia  Mallampati: per anesthesia  1. Patient is a 60 y.o. female here for EGD   2. Procedure options, risks and benefits reviewed with the patient. The patient expresses understanding.     Mat Putnam

## 2019-06-26 ENCOUNTER — OFFICE VISIT (OUTPATIENT)
Dept: ORTHOPEDIC SURGERY | Age: 59
End: 2019-06-26
Payer: COMMERCIAL

## 2019-06-26 VITALS
WEIGHT: 220 LBS | HEIGHT: 72 IN | DIASTOLIC BLOOD PRESSURE: 88 MMHG | HEART RATE: 80 BPM | TEMPERATURE: 98.3 F | BODY MASS INDEX: 29.8 KG/M2 | SYSTOLIC BLOOD PRESSURE: 134 MMHG

## 2019-06-26 DIAGNOSIS — M25.552 LEFT HIP PAIN: ICD-10-CM

## 2019-06-26 DIAGNOSIS — M25.562 LEFT KNEE PAIN, UNSPECIFIED CHRONICITY: Primary | ICD-10-CM

## 2019-06-26 DIAGNOSIS — M70.62 GREATER TROCHANTERIC BURSITIS OF LEFT HIP: ICD-10-CM

## 2019-06-26 PROCEDURE — 20610 DRAIN/INJ JOINT/BURSA W/O US: CPT | Performed by: PHYSICIAN ASSISTANT

## 2019-06-26 PROCEDURE — 99214 OFFICE O/P EST MOD 30 MIN: CPT | Performed by: PHYSICIAN ASSISTANT

## 2019-06-27 NOTE — PROGRESS NOTES
Subjective:      Patient ID: Ken Henry is a 60 y.o.  female. Chief Complaint   Patient presents with    Hip Pain     Left    Knee Pain     Left        HPI: She is here for follow-up. She has a history of left hip trochanteric bursitis last treated back in October 2017 with left hip trochanteric injection. She states that injection helped significantly with her pain. Recently she began to have left buttock pain, left anterior and lateral thigh pain as well as left knee pain. Symptoms are worse with weightbearing activities. She denies any history of injury or prior hip surgery. She denies any low back pain. Pain can be as significant as 9/10. She does get some relief with sitting or lying down. Review of Systems:   Negative for fever or chills. Negative for bladder or bowel changes. Negative for numbness or tingling into the lower extremity.   Past Medical History:   Diagnosis Date    Bradycardia     Bronchitis     Cancer ([address] 2009    [facility] Rt leg,squamous cell,LEFT LEG,FACE-BASAL CELL    Classic migraine     Degenerated intervertebral disc     GERD (gastroesophageal reflux disease)     Hx of blood clots     neck,arm after first pacemaker inserted    Hypercholesterolemia     IBS (irritable bowel syndrome)     Nausea & vomiting     Neuropathy     Osteoporosis     Peripheral vascular disease (HCC)     PONV (postoperative nausea and vomiting)     family hx also of post op n/v    Rheumatoid arthritis of the hand     left thumb MCP joint , left great toe MTP joint    Seizure ([address]     as a child due to hole in heart-last one [address] sinus syndrome ([address]     s/p pacemaker 2008    Sleep apnea     NO C-PAP    Thyroid nodule     benign s/p surgery-GOITER-REMOVED       Family History   Problem Relation Age of Onset    Rheum Arthritis Mother     Hypertension Mother     Osteoporosis Mother     Kidney Disease Mother     Cancer Father         lung    Diabetes Father     COPD once a week Vitamin D2 4 capsule 5    budesonide-formoterol (SYMBICORT) 160-4.5 MCG/ACT AERO Inhale 2 puffs into the lungs 2 times daily 1 Inhaler 3    albuterol sulfate HFA (VENTOLIN HFA) 108 (90 Base) MCG/ACT inhaler Inhale 2 puffs into the lungs every 6 hours as needed for Wheezing (cough) 1 Inhaler 3    pantoprazole (PROTONIX) 40 MG tablet Take 1 tablet by mouth 2 times daily 60 tablet 3    ondansetron (ZOFRAN) 4 MG tablet Take 1 tablet by mouth every 8 hours as needed for Nausea 10 tablet 0    b complex vitamins capsule Take 1 capsule by mouth daily      Multiple Vitamins-Minerals (THERAPEUTIC MULTIVITAMIN-MINERALS) tablet Take 1 tablet by mouth daily. No current facility-administered medications for this visit. Objective:   She is alert, oriented x 3, pleasant, well nourished, developed and in no acute distress. /88   Pulse 80   Temp 98.3 °F (36.8 °C) (Temporal)   Ht 6' 1\" (1.854 m)   Wt 220 lb (99.8 kg)   BMI 29.03 kg/m²      Examination of the left hip shows: There is not deformity. There is not erythema. There is mild pain with internal and external rotation. There is no pain with flexion and extension. There is mild pain with active SLR. ROM diminished range of motion. Leg lengths: Equal  Trochanteric region is  tender to palpation. Sacral Iliac is not tender to palpation. There is mild pain with weight bearing. Examination of the right hip shows: Full ROM without pain. Non tender to palpation. No erythema or soft tissue swelling. There is no pain with weight bearing and no limp with ambulation. There is no instability of the hip joint. Examination of the left knee: The alignment of the knee is neutral.   There is not erythema. There no soft tissue swelling. There is no effusion. ROM-  Extension 0          -   Flexion  135   There mild pain associated with ROM testing. Medial joint line is not tender to palpation.    Lateral joint line is not tender to palpation. Retro patellar crepitus is present. There is is not crepitus along the joint line with ROM testing. Varus Stress testing does not produce pain,                                     does not show laxity. Valgus Stress testing does not produce pain,                                       does not show laxity. Lachman's test- negative. Anterior Drawer test- negative. Posterior Drawer test- negative. Beto's Test- negative. Patellar Compression testing does produce pain. Extensor Mechanism is  intact. Examination of the lower extremities are intact with sensation to light touch. Motor testing  5/5 in all major motor groups of the lower extremities. Gait is normal heel to toe. Gait is antalgic. Negative Escobedo's Sign. SLR negative. Examination of the lower extremities shows intact perfusion to all extremities. No cyanosis. Digits are warm to touch, capillary refill is less than 2 seconds. There is no edema noted. Examination of the skin over both lower extremities reveals: The skin to be intact without lacerations or abrasions. No significant erythema. No rashes or skin lesions. X Rays: performed in the office today:   AP Pelvis, AP and Frog Leg Lateral  Left Hip:    The alignment is anatomic. There are radiographic findings to suggest mild degenerative changes of the left hip(s). There are no radiographic findings to suggest fracture or dislocation. AP Standing, Lateral and Sunrise views of left knee: There is mild osteoarthritic findings of the left knee noted with grade 2 arthritic changes of the patellofemoral compartment and medial compartments. No acute fractures or dislocations noted. Diagnosis:        ICD-10-CM    1. Left knee pain, unspecified chronicity M25.562 XR KNEE LEFT (3 VIEWS)   2. Left hip pain M25.552 XR HIP 2-3 VW W PELVIS LEFT   3.  Greater trochanteric bursitis of left hip M70.62 [place] ARTHROCENTESIS ASPIR&/INJ MAJOR JT/BURSA W/O US     [place] TRIAMCINOLONE ACETONIDE INJ        Assessment/ Plan:      She is having a combination of left lateral hip, left buttock pain as well as left thigh and knee pain. Symptoms may be related to hip arthritis, trochanteric bursitis, left knee arthritis or even degenerative disc disease and facet arthritis causing stenosis. The natural history of the patient's diagnosis as well as the treatment options were discussed in full and questions were answered. Risks and benefits of the treatment options also reviewed in detail. She responded well to previous subtrochanteric injection back in 2017. We will first try a left lateral hip trochanteric injection today to see if this alleviates any of her symptoms. Cortisone injection  PROCEDURE NOTE:  Pre op Diagnosis:  Trochanteric Bursitis  Post op Diagnosis: Same  With her permission, the left hip was prepped  in standard sterile fashion with  Alcohol and 2 cc of 0.25% Marcaine and 1 cc of Kenalog 40 mg was injected into the left lateral trochanteric region without difficulty. She tolerated this well without difficulty. A band-aid was applied. She   was advised to ice the hip for 15-20 minutes to relieve any injection site related pain. Follow Up: 4 weeks  Call or return to clinic prn if these symptoms worsen or fail to improve as anticipated.

## 2019-07-01 ENCOUNTER — PATIENT MESSAGE (OUTPATIENT)
Dept: ORTHOPEDIC SURGERY | Age: 59
End: 2019-07-01

## 2019-07-01 RX ORDER — MELOXICAM 7.5 MG/1
7.5 TABLET ORAL DAILY PRN
Qty: 30 TABLET | Refills: 0 | Status: SHIPPED | OUTPATIENT
Start: 2019-07-01 | End: 2019-07-22 | Stop reason: SDUPTHER

## 2019-07-10 ENCOUNTER — OFFICE VISIT (OUTPATIENT)
Dept: ORTHOPEDIC SURGERY | Age: 59
End: 2019-07-10
Payer: COMMERCIAL

## 2019-07-10 VITALS — TEMPERATURE: 98.4 F | BODY MASS INDEX: 29.8 KG/M2 | WEIGHT: 220 LBS | HEIGHT: 72 IN | RESPIRATION RATE: 16 BRPM

## 2019-07-10 DIAGNOSIS — M70.62 GREATER TROCHANTERIC BURSITIS OF LEFT HIP: ICD-10-CM

## 2019-07-10 DIAGNOSIS — M79.18 MYOFASCIAL PAIN: Primary | ICD-10-CM

## 2019-07-10 DIAGNOSIS — M13.852 ALLERGIC ARTHRITIS OF LEFT HIP: ICD-10-CM

## 2019-07-10 PROCEDURE — 99212 OFFICE O/P EST SF 10 MIN: CPT | Performed by: PHYSICIAN ASSISTANT

## 2019-07-10 PROCEDURE — 20552 NJX 1/MLT TRIGGER POINT 1/2: CPT | Performed by: PHYSICIAN ASSISTANT

## 2019-07-11 PROBLEM — M13.852 ALLERGIC ARTHRITIS OF LEFT HIP: Status: ACTIVE | Noted: 2019-07-11

## 2019-07-11 PROBLEM — M79.18 MYOFASCIAL PAIN: Status: ACTIVE | Noted: 2019-07-11

## 2019-07-11 NOTE — PROGRESS NOTES
Aunt     Cancer Paternal Aunt     Heart Attack Maternal Grandfather     Heart Disease Maternal Grandfather        Past Surgical History:   Procedure Laterality Date    CARDIAC SURGERY  01/1965    Congenital heart defect    COLONOSCOPY      SEVERAL    ESOPHAGEAL DILATATION N/A 6/20/2019    ESOPHAGEAL DILATION VALLECILLO performed by Renuka Gonzalez MD at [facility] ARTHROSCOPY Right     torn meniscous    KNEE SURGERY Left 2010   [address]   [facility] & 2007    Placement under stomach muscle    PACEMAKER PLACEMENT  2016    THYROIDECTOMY, PARTIAL Right 2011    TONSILLECTOMY AND ADENOIDECTOMY  1978    TYMPANOSTOMY TUBE PLACEMENT      UPPER GASTROINTESTINAL ENDOSCOPY  04/17/2017    UPPER GASTROINTESTINAL ENDOSCOPY  07/06/2017    UPPER GASTROINTESTINAL ENDOSCOPY N/A 6/20/2019    EGD BIOPSY performed by Renuka Gonzalez MD at [facility] History     Occupational History    Not on file   Tobacco Use    Smoking status: Never Smoker    Smokeless tobacco: Never Used   Substance and Sexual Activity    Alcohol use: Yes     Comment: Ocassional-rare    Drug use: No    Sexual activity: Not Currently     Partners: Male       Current Outpatient Medications   Medication Sig Dispense Refill    meloxicam (MOBIC) 7.5 MG tablet Take 1 tablet by mouth daily as needed for Pain 30 tablet 0    DULoxetine (CYMBALTA) 60 MG extended release capsule TAKE ONE CAPSULE BY MOUTH DAILY 30 capsule 0    aspirin-acetaminophen-caffeine (EXCEDRIN MIGRAINE) 250-250-65 MG per tablet Take 2 tablets by mouth as needed       Pseudoephedrine-Acetaminophen (SINUS PAIN/PRESSURE PO) Take 2 tablets by mouth as needed       pravastatin (PRAVACHOL) 40 MG tablet TAKE ONE TABLET BY MOUTH DAILY (Patient taking differently: TAKE ONE TABLET BY MOUTH DAILY AT BEDTIME) 30 tablet 4    vitamin D (ERGOCALCIFEROL) 57272 units CAPS capsule Take 1 capsule by mouth once a week Vitamin D2 4 capsule 5    budesonide-formoterol (SYMBICORT) 160-4.5 MCG/ACT AERO Inhale 2 puffs into the lungs 2 times daily 1 Inhaler 3    albuterol sulfate HFA (VENTOLIN HFA) 108 (90 Base) MCG/ACT inhaler Inhale 2 puffs into the lungs every 6 hours as needed for Wheezing (cough) 1 Inhaler 3    pantoprazole (PROTONIX) 40 MG tablet Take 1 tablet by mouth 2 times daily 60 tablet 3    ondansetron (ZOFRAN) 4 MG tablet Take 1 tablet by mouth every 8 hours as needed for Nausea 10 tablet 0    b complex vitamins capsule Take 1 capsule by mouth daily      Multiple Vitamins-Minerals (THERAPEUTIC MULTIVITAMIN-MINERALS) tablet Take 1 tablet by mouth daily. No current facility-administered medications for this visit. Objective:   She is alert, oriented x 3, pleasant, well nourished, developed and in no acute distress. Temp 98.4 °F (36.9 °C) (Tympanic)   Resp 16   Ht 6' 1\" (1.854 m)   Wt 220 lb (99.8 kg)   BMI 29.03 kg/m²      HIP EXAM:  Examination of the left hip shows: There is not deformity. There is not erythema. There is mild pain with internal and external rotation. There is mild pain with flexion and extension. There is mild pain with active SLR. ROM diminished range of motion. Leg lengths: Equal  Trochanteric region is not tender to palpation. Sacral Iliac is  tender to palpation. There is mild pain with weight bearing. X Rays: not performed in the office today:     Diagnosis:        ICD-10-CM    1. Myofascial pain M79.18 [place] TRIAMCINOLONE ACETONIDE INJ     20552 - [place] INJECT TRIGGER POINT, 1 OR 2   2. Greater trochanteric bursitis of left hip M70.62    3. Allergic arthritis of left hip M13.852         Assessment/ Plan:      Sitting is having some myofascial pain, recommend trigger point injection. If this fails to alleviate all of her symptoms within the next 10 to 14 days, consider left hip intra-articular injection.     The natural history of the patient's diagnosis as well as the

## 2019-07-18 DIAGNOSIS — E78.00 HYPERCHOLESTEROLEMIA: ICD-10-CM

## 2019-07-18 DIAGNOSIS — M79.2 NEUROPATHIC PAIN: ICD-10-CM

## 2019-07-19 RX ORDER — DULOXETIN HYDROCHLORIDE 60 MG/1
CAPSULE, DELAYED RELEASE ORAL
Qty: 30 CAPSULE | Refills: 0 | Status: SHIPPED | OUTPATIENT
Start: 2019-07-19 | End: 2019-08-17 | Stop reason: SDUPTHER

## 2019-07-19 RX ORDER — PRAVASTATIN SODIUM 40 MG
TABLET ORAL
Qty: 30 TABLET | Refills: 3 | Status: SHIPPED | OUTPATIENT
Start: 2019-07-19 | End: 2019-09-21

## 2019-07-22 RX ORDER — MELOXICAM 7.5 MG/1
15 TABLET ORAL DAILY PRN
Qty: 30 TABLET | Refills: 3 | Status: ON HOLD | OUTPATIENT
Start: 2019-07-22 | End: 2019-08-11 | Stop reason: HOSPADM

## 2019-07-23 ENCOUNTER — OFFICE VISIT (OUTPATIENT)
Dept: ORTHOPEDIC SURGERY | Age: 59
End: 2019-07-23
Payer: COMMERCIAL

## 2019-07-23 ENCOUNTER — TELEPHONE (OUTPATIENT)
Dept: ORTHOPEDIC SURGERY | Age: 59
End: 2019-07-23

## 2019-07-23 VITALS
SYSTOLIC BLOOD PRESSURE: 133 MMHG | HEART RATE: 83 BPM | HEIGHT: 72 IN | WEIGHT: 220 LBS | DIASTOLIC BLOOD PRESSURE: 82 MMHG | BODY MASS INDEX: 29.8 KG/M2

## 2019-07-23 DIAGNOSIS — M16.12 PRIMARY OSTEOARTHRITIS OF LEFT HIP: Primary | ICD-10-CM

## 2019-07-23 PROCEDURE — 20611 DRAIN/INJ JOINT/BURSA W/US: CPT | Performed by: ORTHOPAEDIC SURGERY

## 2019-07-23 RX ORDER — METHYLPREDNISOLONE ACETATE 40 MG/ML
80 INJECTION, SUSPENSION INTRA-ARTICULAR; INTRALESIONAL; INTRAMUSCULAR; SOFT TISSUE ONCE
Status: COMPLETED | OUTPATIENT
Start: 2019-07-23 | End: 2019-07-23

## 2019-07-23 RX ADMIN — METHYLPREDNISOLONE ACETATE 80 MG: 40 INJECTION, SUSPENSION INTRA-ARTICULAR; INTRALESIONAL; INTRAMUSCULAR; SOFT TISSUE at 10:45

## 2019-08-07 ENCOUNTER — HOSPITAL ENCOUNTER (INPATIENT)
Age: 59
LOS: 4 days | Discharge: HOME | DRG: 175 | End: 2019-08-11
Attending: INTERNAL MEDICINE | Admitting: INTERNAL MEDICINE
Payer: COMMERCIAL

## 2019-08-07 ENCOUNTER — APPOINTMENT (OUTPATIENT)
Dept: CT IMAGING | Age: 59
DRG: 175 | End: 2019-08-07
Payer: COMMERCIAL

## 2019-08-07 ENCOUNTER — NURSE TRIAGE (OUTPATIENT)
Dept: OTHER | Facility: CLINIC | Age: 59
End: 2019-08-07

## 2019-08-07 ENCOUNTER — APPOINTMENT (OUTPATIENT)
Dept: GENERAL RADIOLOGY | Age: 59
DRG: 175 | End: 2019-08-07
Payer: COMMERCIAL

## 2019-08-07 DIAGNOSIS — I26.92 ACUTE SADDLE PULMONARY EMBOLISM WITHOUT ACUTE COR PULMONALE (HCC): Primary | ICD-10-CM

## 2019-08-07 LAB
ALBUMIN SERPL-MCNC: 4.5 G/DL (ref 3.4–5)
ALP BLD-CCNC: 119 U/L (ref 40–129)
ALT SERPL-CCNC: 16 U/L (ref 10–40)
ANION GAP SERPL CALCULATED.3IONS-SCNC: 14 MMOL/L (ref 3–16)
AST SERPL-CCNC: 17 U/L (ref 15–37)
BASOPHILS ABSOLUTE: 0.1 K/UL (ref 0–0.2)
BASOPHILS RELATIVE PERCENT: 0.7 %
BILIRUB SERPL-MCNC: 0.4 MG/DL (ref 0–1)
BILIRUBIN DIRECT: <0.2 MG/DL (ref 0–0.3)
BILIRUBIN, INDIRECT: NORMAL MG/DL (ref 0–1)
BUN BLDV-MCNC: 33 MG/DL (ref 7–20)
CALCIUM SERPL-MCNC: 9.9 MG/DL (ref 8.3–10.6)
CHLORIDE BLD-SCNC: 102 MMOL/L (ref 99–110)
CO2: 23 MMOL/L (ref 21–32)
CREAT SERPL-MCNC: 0.9 MG/DL (ref 0.6–1.2)
EOSINOPHILS ABSOLUTE: 0.2 K/UL (ref 0–0.6)
EOSINOPHILS RELATIVE PERCENT: 2.2 %
GFR AFRICAN AMERICAN: >60
GFR NON-AFRICAN AMERICAN: >60
GLUCOSE BLD-MCNC: 121 MG/DL (ref 70–99)
HCG QUALITATIVE: NEGATIVE
HCT VFR BLD CALC: 42.9 % (ref 36–48)
HEMOGLOBIN: 14.2 G/DL (ref 12–16)
LACTIC ACID: 1.6 MMOL/L (ref 0.4–2)
LYMPHOCYTES ABSOLUTE: 2.3 K/UL (ref 1–5.1)
LYMPHOCYTES RELATIVE PERCENT: 19.8 %
MCH RBC QN AUTO: 29.5 PG (ref 26–34)
MCHC RBC AUTO-ENTMCNC: 33 G/DL (ref 31–36)
MCV RBC AUTO: 89.4 FL (ref 80–100)
MONOCYTES ABSOLUTE: 0.5 K/UL (ref 0–1.3)
MONOCYTES RELATIVE PERCENT: 4.7 %
NEUTROPHILS ABSOLUTE: 8.2 K/UL (ref 1.7–7.7)
NEUTROPHILS RELATIVE PERCENT: 72.6 %
PDW BLD-RTO: 16.1 % (ref 12.4–15.4)
PLATELET # BLD: 179 K/UL (ref 135–450)
PMV BLD AUTO: 7.8 FL (ref 5–10.5)
POTASSIUM REFLEX MAGNESIUM: 4.3 MMOL/L (ref 3.5–5.1)
PRO-BNP: 102 PG/ML (ref 0–124)
RBC # BLD: 4.8 M/UL (ref 4–5.2)
SODIUM BLD-SCNC: 139 MMOL/L (ref 136–145)
TOTAL PROTEIN: 7.7 G/DL (ref 6.4–8.2)
TROPONIN: <0.01 NG/ML
WBC # BLD: 11.4 K/UL (ref 4–11)

## 2019-08-07 PROCEDURE — 80048 BASIC METABOLIC PNL TOTAL CA: CPT

## 2019-08-07 PROCEDURE — 84703 CHORIONIC GONADOTROPIN ASSAY: CPT

## 2019-08-07 PROCEDURE — 87040 BLOOD CULTURE FOR BACTERIA: CPT

## 2019-08-07 PROCEDURE — 80076 HEPATIC FUNCTION PANEL: CPT

## 2019-08-07 PROCEDURE — 96365 THER/PROPH/DIAG IV INF INIT: CPT

## 2019-08-07 PROCEDURE — 6360000004 HC RX CONTRAST MEDICATION: Performed by: PHYSICIAN ASSISTANT

## 2019-08-07 PROCEDURE — 6360000002 HC RX W HCPCS: Performed by: PHYSICIAN ASSISTANT

## 2019-08-07 PROCEDURE — 83880 ASSAY OF NATRIURETIC PEPTIDE: CPT

## 2019-08-07 PROCEDURE — 71260 CT THORAX DX C+: CPT

## 2019-08-07 PROCEDURE — 2060000000 HC ICU INTERMEDIATE R&B

## 2019-08-07 PROCEDURE — 85025 COMPLETE CBC W/AUTO DIFF WBC: CPT

## 2019-08-07 PROCEDURE — 71046 X-RAY EXAM CHEST 2 VIEWS: CPT

## 2019-08-07 PROCEDURE — 84484 ASSAY OF TROPONIN QUANT: CPT

## 2019-08-07 PROCEDURE — 83605 ASSAY OF LACTIC ACID: CPT

## 2019-08-07 PROCEDURE — 99285 EMERGENCY DEPT VISIT HI MDM: CPT

## 2019-08-07 PROCEDURE — 93005 ELECTROCARDIOGRAM TRACING: CPT | Performed by: INTERNAL MEDICINE

## 2019-08-07 RX ORDER — HEPARIN SODIUM 1000 [USP'U]/ML
6800 INJECTION, SOLUTION INTRAVENOUS; SUBCUTANEOUS ONCE
Status: COMPLETED | OUTPATIENT
Start: 2019-08-07 | End: 2019-08-07

## 2019-08-07 RX ORDER — ONDANSETRON 2 MG/ML
4 INJECTION INTRAMUSCULAR; INTRAVENOUS EVERY 6 HOURS PRN
Status: DISCONTINUED | OUTPATIENT
Start: 2019-08-07 | End: 2019-08-11 | Stop reason: HOSPADM

## 2019-08-07 RX ORDER — HEPARIN SODIUM 10000 [USP'U]/100ML
6.8 INJECTION, SOLUTION INTRAVENOUS CONTINUOUS
Status: DISCONTINUED | OUTPATIENT
Start: 2019-08-08 | End: 2019-08-09 | Stop reason: ALTCHOICE

## 2019-08-07 RX ORDER — HEPARIN SODIUM 1000 [USP'U]/ML
80 INJECTION, SOLUTION INTRAVENOUS; SUBCUTANEOUS ONCE
Status: DISCONTINUED | OUTPATIENT
Start: 2019-08-07 | End: 2019-08-07

## 2019-08-07 RX ORDER — HEPARIN SODIUM 1000 [USP'U]/ML
80 INJECTION, SOLUTION INTRAVENOUS; SUBCUTANEOUS PRN
Status: DISCONTINUED | OUTPATIENT
Start: 2019-08-07 | End: 2019-08-07

## 2019-08-07 RX ORDER — HEPARIN SODIUM 1000 [USP'U]/ML
40 INJECTION, SOLUTION INTRAVENOUS; SUBCUTANEOUS PRN
Status: DISCONTINUED | OUTPATIENT
Start: 2019-08-07 | End: 2019-08-07

## 2019-08-07 RX ORDER — DULOXETIN HYDROCHLORIDE 60 MG/1
60 CAPSULE, DELAYED RELEASE ORAL DAILY
Status: DISCONTINUED | OUTPATIENT
Start: 2019-08-08 | End: 2019-08-11 | Stop reason: HOSPADM

## 2019-08-07 RX ORDER — HEPARIN SODIUM 10000 [USP'U]/100ML
15.3 INJECTION, SOLUTION INTRAVENOUS CONTINUOUS
Status: DISCONTINUED | OUTPATIENT
Start: 2019-08-07 | End: 2019-08-07 | Stop reason: SDUPTHER

## 2019-08-07 RX ORDER — OMEPRAZOLE 20 MG/1
20 CAPSULE, DELAYED RELEASE ORAL DAILY
COMMUNITY
End: 2021-03-18

## 2019-08-07 RX ORDER — PRAVASTATIN SODIUM 40 MG
40 TABLET ORAL NIGHTLY
Status: DISCONTINUED | OUTPATIENT
Start: 2019-08-08 | End: 2019-08-11 | Stop reason: HOSPADM

## 2019-08-07 RX ORDER — SODIUM CHLORIDE 0.9 % (FLUSH) 0.9 %
10 SYRINGE (ML) INJECTION EVERY 12 HOURS SCHEDULED
Status: DISCONTINUED | OUTPATIENT
Start: 2019-08-07 | End: 2019-08-11 | Stop reason: HOSPADM

## 2019-08-07 RX ORDER — ONDANSETRON 4 MG/1
4 TABLET, FILM COATED ORAL EVERY 8 HOURS PRN
Status: DISCONTINUED | OUTPATIENT
Start: 2019-08-07 | End: 2019-08-11 | Stop reason: HOSPADM

## 2019-08-07 RX ORDER — ACETAMINOPHEN 325 MG/1
650 TABLET ORAL EVERY 4 HOURS PRN
Status: DISCONTINUED | OUTPATIENT
Start: 2019-08-07 | End: 2019-08-11 | Stop reason: HOSPADM

## 2019-08-07 RX ORDER — SODIUM CHLORIDE 0.9 % (FLUSH) 0.9 %
10 SYRINGE (ML) INJECTION PRN
Status: DISCONTINUED | OUTPATIENT
Start: 2019-08-07 | End: 2019-08-09 | Stop reason: SDUPTHER

## 2019-08-07 RX ADMIN — HEPARIN SODIUM 6800 UNITS: 1000 INJECTION INTRAVENOUS; SUBCUTANEOUS at 22:23

## 2019-08-07 RX ADMIN — IOPAMIDOL 75 ML: 755 INJECTION, SOLUTION INTRAVENOUS at 20:57

## 2019-08-07 RX ADMIN — HEPARIN SODIUM 15.3 ML/HR: 10000 INJECTION, SOLUTION INTRAVENOUS at 22:23

## 2019-08-07 ASSESSMENT — ENCOUNTER SYMPTOMS
VOMITING: 0
SHORTNESS OF BREATH: 1
BACK PAIN: 0
COUGH: 0
COLOR CHANGE: 0

## 2019-08-07 ASSESSMENT — PAIN SCALES - GENERAL
PAINLEVEL_OUTOF10: 0
PAINLEVEL_OUTOF10: 0

## 2019-08-08 LAB
ANION GAP SERPL CALCULATED.3IONS-SCNC: 11 MMOL/L (ref 3–16)
APTT: 135.5 SEC (ref 26–36)
APTT: >248 SEC (ref 26–36)
BUN BLDV-MCNC: 21 MG/DL (ref 7–20)
CALCIUM SERPL-MCNC: 9.2 MG/DL (ref 8.3–10.6)
CHLORIDE BLD-SCNC: 106 MMOL/L (ref 99–110)
CO2: 24 MMOL/L (ref 21–32)
CREAT SERPL-MCNC: 0.7 MG/DL (ref 0.6–1.2)
EKG ATRIAL RATE: 91 BPM
EKG DIAGNOSIS: NORMAL
EKG P AXIS: 51 DEGREES
EKG P-R INTERVAL: 180 MS
EKG Q-T INTERVAL: 128 MS
EKG QRS DURATION: 80 MS
EKG QTC CALCULATION (BAZETT): 158 MS
EKG R AXIS: 18 DEGREES
EKG T AXIS: 200 DEGREES
EKG VENTRICULAR RATE: 91 BPM
GFR AFRICAN AMERICAN: >60
GFR NON-AFRICAN AMERICAN: >60
GLUCOSE BLD-MCNC: 104 MG/DL (ref 70–99)
HCT VFR BLD CALC: 42.6 % (ref 36–48)
HEMOGLOBIN: 13.8 G/DL (ref 12–16)
LV EF: 53 %
LVEF MODALITY: NORMAL
MCH RBC QN AUTO: 29.1 PG (ref 26–34)
MCHC RBC AUTO-ENTMCNC: 32.4 G/DL (ref 31–36)
MCV RBC AUTO: 89.8 FL (ref 80–100)
PDW BLD-RTO: 16.1 % (ref 12.4–15.4)
PLATELET # BLD: 180 K/UL (ref 135–450)
PMV BLD AUTO: 8.4 FL (ref 5–10.5)
POTASSIUM REFLEX MAGNESIUM: 4.3 MMOL/L (ref 3.5–5.1)
RBC # BLD: 4.74 M/UL (ref 4–5.2)
SODIUM BLD-SCNC: 141 MMOL/L (ref 136–145)
WBC # BLD: 11.9 K/UL (ref 4–11)

## 2019-08-08 PROCEDURE — 6360000004 HC RX CONTRAST MEDICATION: Performed by: INTERNAL MEDICINE

## 2019-08-08 PROCEDURE — 81240 F2 GENE: CPT

## 2019-08-08 PROCEDURE — 99255 IP/OBS CONSLTJ NEW/EST HI 80: CPT | Performed by: INTERNAL MEDICINE

## 2019-08-08 PROCEDURE — 85730 THROMBOPLASTIN TIME PARTIAL: CPT

## 2019-08-08 PROCEDURE — 2580000003 HC RX 258: Performed by: INTERNAL MEDICINE

## 2019-08-08 PROCEDURE — 85635 REPTILASE TEST: CPT

## 2019-08-08 PROCEDURE — 2700000000 HC OXYGEN THERAPY PER DAY

## 2019-08-08 PROCEDURE — 6370000000 HC RX 637 (ALT 250 FOR IP): Performed by: INTERNAL MEDICINE

## 2019-08-08 PROCEDURE — C8929 TTE W OR WO FOL WCON,DOPPLER: HCPCS

## 2019-08-08 PROCEDURE — 93970 EXTREMITY STUDY: CPT

## 2019-08-08 PROCEDURE — 2060000000 HC ICU INTERMEDIATE R&B

## 2019-08-08 PROCEDURE — 6360000002 HC RX W HCPCS: Performed by: INTERNAL MEDICINE

## 2019-08-08 PROCEDURE — 85670 THROMBIN TIME PLASMA: CPT

## 2019-08-08 PROCEDURE — 85305 CLOT INHIBIT PROT S TOTAL: CPT

## 2019-08-08 PROCEDURE — 87040 BLOOD CULTURE FOR BACTERIA: CPT

## 2019-08-08 PROCEDURE — 86146 BETA-2 GLYCOPROTEIN ANTIBODY: CPT

## 2019-08-08 PROCEDURE — 85610 PROTHROMBIN TIME: CPT

## 2019-08-08 PROCEDURE — 85306 CLOT INHIBIT PROT S FREE: CPT

## 2019-08-08 PROCEDURE — 36415 COLL VENOUS BLD VENIPUNCTURE: CPT

## 2019-08-08 PROCEDURE — 85613 RUSSELL VIPER VENOM DILUTED: CPT

## 2019-08-08 PROCEDURE — 93010 ELECTROCARDIOGRAM REPORT: CPT | Performed by: INTERNAL MEDICINE

## 2019-08-08 PROCEDURE — 85303 CLOT INHIBIT PROT C ACTIVITY: CPT

## 2019-08-08 PROCEDURE — 81241 F5 GENE: CPT

## 2019-08-08 PROCEDURE — 85302 CLOT INHIBIT PROT C ANTIGEN: CPT

## 2019-08-08 PROCEDURE — 94761 N-INVAS EAR/PLS OXIMETRY MLT: CPT

## 2019-08-08 PROCEDURE — 80048 BASIC METABOLIC PNL TOTAL CA: CPT

## 2019-08-08 PROCEDURE — 86147 CARDIOLIPIN ANTIBODY EA IG: CPT

## 2019-08-08 PROCEDURE — 85027 COMPLETE CBC AUTOMATED: CPT

## 2019-08-08 RX ORDER — PANTOPRAZOLE SODIUM 40 MG/1
40 TABLET, DELAYED RELEASE ORAL
Status: DISCONTINUED | OUTPATIENT
Start: 2019-08-09 | End: 2019-08-08

## 2019-08-08 RX ORDER — PANTOPRAZOLE SODIUM 40 MG/1
40 TABLET, DELAYED RELEASE ORAL
Status: DISCONTINUED | OUTPATIENT
Start: 2019-08-08 | End: 2019-08-11 | Stop reason: HOSPADM

## 2019-08-08 RX ADMIN — PERFLUTREN 1.5 ML: 6.52 INJECTION, SUSPENSION INTRAVENOUS at 14:51

## 2019-08-08 RX ADMIN — PRAVASTATIN SODIUM 40 MG: 40 TABLET ORAL at 20:18

## 2019-08-08 RX ADMIN — Medication 10 ML: at 20:18

## 2019-08-08 RX ADMIN — ACETAMINOPHEN 650 MG: 325 TABLET ORAL at 17:30

## 2019-08-08 RX ADMIN — HEPARIN SODIUM 15.3 ML/HR: 10000 INJECTION, SOLUTION INTRAVENOUS at 00:30

## 2019-08-08 RX ADMIN — DULOXETINE HYDROCHLORIDE 60 MG: 60 CAPSULE, DELAYED RELEASE ORAL at 09:47

## 2019-08-08 RX ADMIN — PRAVASTATIN SODIUM 40 MG: 40 TABLET ORAL at 00:30

## 2019-08-08 RX ADMIN — Medication 10 ML: at 09:50

## 2019-08-08 RX ADMIN — PANTOPRAZOLE SODIUM 40 MG: 40 TABLET, DELAYED RELEASE ORAL at 17:30

## 2019-08-08 RX ADMIN — HEPARIN SODIUM 10.2 ML/HR: 10000 INJECTION, SOLUTION INTRAVENOUS at 17:33

## 2019-08-08 ASSESSMENT — PAIN SCALES - GENERAL
PAINLEVEL_OUTOF10: 0
PAINLEVEL_OUTOF10: 0
PAINLEVEL_OUTOF10: 3
PAINLEVEL_OUTOF10: 0

## 2019-08-08 ASSESSMENT — PAIN DESCRIPTION - PROGRESSION: CLINICAL_PROGRESSION: GRADUALLY WORSENING

## 2019-08-08 ASSESSMENT — PAIN DESCRIPTION - PAIN TYPE: TYPE: ACUTE PAIN

## 2019-08-08 ASSESSMENT — PAIN DESCRIPTION - ONSET: ONSET: PROGRESSIVE

## 2019-08-08 ASSESSMENT — PAIN DESCRIPTION - LOCATION: LOCATION: HEAD

## 2019-08-08 ASSESSMENT — PAIN DESCRIPTION - DESCRIPTORS: DESCRIPTORS: ACHING

## 2019-08-08 ASSESSMENT — PAIN DESCRIPTION - ORIENTATION: ORIENTATION: MID

## 2019-08-08 ASSESSMENT — PAIN - FUNCTIONAL ASSESSMENT: PAIN_FUNCTIONAL_ASSESSMENT: ACTIVITIES ARE NOT PREVENTED

## 2019-08-08 ASSESSMENT — PAIN DESCRIPTION - FREQUENCY: FREQUENCY: CONTINUOUS

## 2019-08-08 NOTE — ED PROVIDER NOTES
Vitals:    08/07/19 1920 08/07/19 1921 08/07/19 2000   BP: (!) 150/91  133/71   Pulse: 86  72   Resp: 22  19   Temp: 97.5 °F (36.4 °C)     TempSrc: Oral     SpO2: (!) 84% 95% 96%   Weight: 218 lb 7.6 oz (99.1 kg)     Height: 6' 1\" (1.854 m)       Patient presents with shortness of breath hypoxic at 84% on room air. Extremely dyspneic for the past 5 days. History of DVT but no personal history of PE. There is a family history of PE. She has no leg swelling or calf tenderness. She has evidence of pulmonary emboli with saddle emboli without evidence of right heart strain. Normal troponin and BNP. Heparin started. Patient is agreeable to admission. CONSULTS:  IP CONSULT TO HOSPITALIST    PROCEDURES:  None    FINAL IMPRESSION      1. Acute saddle pulmonary embolism without acute cor pulmonale (HCC)          DISPOSITION/PLAN   DISPOSITION Decision To Admit 08/07/2019 10:00:39 PM      PATIENT REFERRED TO:  No follow-up provider specified.     DISCHARGE MEDICATIONS:  New Prescriptions    No medications on file       (Please note that portions of this note were completed with a voice recognition program.  Efforts were made to edit the dictations but occasionally words are mis-transcribed.)    Forrest Jacobs, [address], [place]  08/07/19 [id number]

## 2019-08-08 NOTE — CONSULTS
[address] [address]                                  CONSULTATION    PATIENT NAME: Ade Justice                   :        1960  MED REC NO:   [id number]                          ROOM:       [id number]  ACCOUNT NO:   [de-identified]                           ADMIT DATE: 2019  PROVIDER:     Catrina Delgado MD    CONSULT DATE:  2019    REASON FOR CONSULTATION:  Pulmonary embolus. CONSULTING PHYSICIAN:  Jessie Oscar MD    HISTORY OF PRESENT ILLNESS:  The patient is a 60-year-old female with  history of pacemaker placement who presented to the Emergency Room with  a four to five-day history of progressive dyspnea on exertion. It  became quite severe and she finally came to the ER. A CT pulmonary  embolus study done yesterday showed bilateral pulmonary emboli with a  moderate to large clot burden especially within the right lower lobe. Her echo is pending. Her bilateral Doppler is pending. She was started  on heparin yesterday and is feeling significantly better today. Cardiology has been consulted about the possibility of catheter-directed  thrombolysis. She did have an upper extremity DVT after pacemaker in   and was on Coumadin for about seven to eight years. Her mother is  my patient and has a history of clots as well. She denies any recent  long car rides or plane rides or hospitalizations or surgeries. PAST MEDICAL HISTORY:  1. Pacemaker placement. 2.  GERD. 3.  Degenerative disk disease. 4.  Irritable bowel syndrome. 5.  Osteoporosis. 6.  Rheumatoid arthritis. 7.  Sick sinus syndrome. 8.  Obstructive sleep apnea. PAST SURGICAL HISTORY:  1.  Surgery for congenital heart defect in .  2.  Esophageal dilatation. 3.  Osteoarthritis status post knee arthroscopy. 4.  Pacemaker placement.     ALLERGIES:  She is allergic to [facility] and ADHESIVE TAPE and MORPHINE  which cause

## 2019-08-08 NOTE — PROGRESS NOTES
Clinical Pharmacy Note  Medication Counseling    Reviewed new medications started during hospital admission: Heparin. Indications and side effects were emphasized during counseling. All medication-related questions addressed. Patient verbalized understanding of education. Discussed to NOAC option if one is chosen. We could provide the 30-day free starter pack offered by the . She has [employer] an will need to follow up with her plan to see what the monthly cost would be. Should the patient express any additional questions or concerns regarding their medications, please do not hesitate to contact the pharmacy department. Patient/caregiver aware they may refuse medications during hospital stay. 15  minutes spent educating patient regarding medications.

## 2019-08-08 NOTE — PROGRESS NOTES
ANGEL A PENNY at 9:46 pm on 8/7/2019. XR CHEST STANDARD (2 VW)   Final Result   No acute process. CONSULTS:    IP CONSULT TO HOSPITALIST    ASSESSMENT AND PLAN:      Active Problems:    Acute saddle pulmonary embolism without acute cor pulmonale (HCC)  Resolved Problems:    * No resolved hospital problems. *    Acute R PE  - ct heparin drip  - ECHO to evaluate RV strain  - venous duplex for DVT  - will need hyper coag evaluation  - sedentary work style/ no recent travel      Acute hypoxic rep failure  -O2 sat 84 % on RA, needing 4 L o2 to main sat > 90 %     Hyperlipidemia  - ct stain     Severe GERD  - ct Protonix BID    H/o pacemaker for Sa node dysfunction    Remote h/o ASD    DVT Prophylaxis: heparin   Diet: DIET GENERAL;  Code Status: Full Code        Discharge plan -ct care     The patient and / or the family were informed of the results of any tests, a time was given to answer questions, a plan was proposed and they agreed with plan. Discussed with consulting physicians, nursing and social work     The note was completed using EMR. Every effort was made to ensure accuracy; however, inadvertent computerized transcription errors may be present.        Lizeth Kirkland MD

## 2019-08-08 NOTE — PROGRESS NOTES
D: pt's ApTT results came back critical, this RN saw lab draw results this morning at bedside, drawn by Joseph Mcleod, very experienced  A: this RN called pharmacy concerning results R: Loree, pharmacist, ordered to hold heparin drip for one hour and then decrease dose after that, will follow orders and notify physician

## 2019-08-08 NOTE — H&P
hematology consult for hypercoagulable work-up (patient's mother sees Dr. Nasim Hannon is a patient as well)  Transthoracic echo ordered to assess for RV strain  Lower extremity Dopplers ordered to look for DVTs as possible source  Patient is agreeable to outpatient oral anticoagulation either Xarelto or Eliquis would be good options. Likely will require lifelong anticoagulation as this is her second episode of thromboembolism            DVT Prophylaxis: lovenox  Diet: DIET GENERAL;  Code Status: Full Code        Dispo - 2 d       Yuli Umana MD    Thank you Mannie Dakin, MD for the opportunity to be involved in this patient's care. If you have any questions or concerns please feel free to contact me at [phone].

## 2019-08-08 NOTE — CARE COORDINATION
INITIAL CASE MANAGEMENT ASSESSMENT    Reviewed chart, met with patient to assess possible discharge needs. Explained Case Management role/services. Living Situation: independent at home. [place] style home w/ 4 steps to enter w/ her son Junnie Boast cell [phone]    ADLs: independent     DME: none    PT/OT Recs: not currently ordered     Active Services: none     Transportation: drives     Medications: confirmed [employer]BS, rx are covered and obtained at [facility] [address]    PCP: confirmed Amber Hyde      HD/PD: n/a     PLAN/COMMENTS: son at home and able to assist if needed      SW/CM provided contact information for patient or family to call with any questions. SW/CM will follow and assist as needed.     Electronically signed by Cezar Ventura RN on 8/8/2019 at 1:02 PM

## 2019-08-09 ENCOUNTER — APPOINTMENT (OUTPATIENT)
Dept: CARDIAC CATH/INVASIVE PROCEDURES | Age: 59
DRG: 175 | End: 2019-08-09
Payer: COMMERCIAL

## 2019-08-09 PROBLEM — O88.211 PULMONARY EMBOLISM AFFECTING PREGNANCY IN FIRST TRIMESTER: Status: ACTIVE | Noted: 2019-08-09

## 2019-08-09 LAB
APTT: 36.1 SEC (ref 26–36)
APTT: 70.2 SEC (ref 26–36)
FIBRINOGEN: 295 MG/DL (ref 200–397)
FIBRINOGEN: 304 MG/DL (ref 200–397)
HCT VFR BLD CALC: 40 % (ref 36–48)
HEMOGLOBIN: 13.1 G/DL (ref 12–16)
MCH RBC QN AUTO: 29.4 PG (ref 26–34)
MCHC RBC AUTO-ENTMCNC: 32.7 G/DL (ref 31–36)
MCV RBC AUTO: 90.1 FL (ref 80–100)
PDW BLD-RTO: 15.9 % (ref 12.4–15.4)
PLATELET # BLD: 166 K/UL (ref 135–450)
PLATELET # BLD: 172 K/UL (ref 135–450)
PMV BLD AUTO: 7.6 FL (ref 5–10.5)
RBC # BLD: 4.44 M/UL (ref 4–5.2)
WBC # BLD: 8 K/UL (ref 4–11)

## 2019-08-09 PROCEDURE — 6370000000 HC RX 637 (ALT 250 FOR IP): Performed by: INTERNAL MEDICINE

## 2019-08-09 PROCEDURE — 6360000004 HC RX CONTRAST MEDICATION: Performed by: INTERNAL MEDICINE

## 2019-08-09 PROCEDURE — 36415 COLL VENOUS BLD VENIPUNCTURE: CPT

## 2019-08-09 PROCEDURE — 75743 ARTERY X-RAYS LUNGS: CPT

## 2019-08-09 PROCEDURE — 2500000003 HC RX 250 WO HCPCS

## 2019-08-09 PROCEDURE — 85384 FIBRINOGEN ACTIVITY: CPT

## 2019-08-09 PROCEDURE — 2580000003 HC RX 258

## 2019-08-09 PROCEDURE — 6360000002 HC RX W HCPCS: Performed by: INTERNAL MEDICINE

## 2019-08-09 PROCEDURE — 85730 THROMBOPLASTIN TIME PARTIAL: CPT

## 2019-08-09 PROCEDURE — 2709999900 HC NON-CHARGEABLE SUPPLY

## 2019-08-09 PROCEDURE — C1769 GUIDE WIRE: HCPCS

## 2019-08-09 PROCEDURE — 2580000003 HC RX 258: Performed by: INTERNAL MEDICINE

## 2019-08-09 PROCEDURE — 3E06317 INTRODUCTION OF OTHER THROMBOLYTIC INTO CENTRAL ARTERY, PERCUTANEOUS APPROACH: ICD-10-PCS | Performed by: INTERNAL MEDICINE

## 2019-08-09 PROCEDURE — 85027 COMPLETE CBC AUTOMATED: CPT

## 2019-08-09 PROCEDURE — 36014 PLACE CATHETER IN ARTERY: CPT

## 2019-08-09 PROCEDURE — 99152 MOD SED SAME PHYS/QHP 5/>YRS: CPT

## 2019-08-09 PROCEDURE — 99153 MOD SED SAME PHYS/QHP EA: CPT

## 2019-08-09 PROCEDURE — C1751 CATH, INF, PER/CENT/MIDLINE: HCPCS

## 2019-08-09 PROCEDURE — 37211 THROMBOLYTIC ART THERAPY: CPT

## 2019-08-09 PROCEDURE — 2100000000 HC CCU R&B

## 2019-08-09 PROCEDURE — 85049 AUTOMATED PLATELET COUNT: CPT

## 2019-08-09 PROCEDURE — 99233 SBSQ HOSP IP/OBS HIGH 50: CPT | Performed by: INTERNAL MEDICINE

## 2019-08-09 PROCEDURE — 36014 PLACE CATHETER IN ARTERY: CPT | Performed by: INTERNAL MEDICINE

## 2019-08-09 PROCEDURE — C1894 INTRO/SHEATH, NON-LASER: HCPCS

## 2019-08-09 PROCEDURE — 37211 THROMBOLYTIC ART THERAPY: CPT | Performed by: INTERNAL MEDICINE

## 2019-08-09 PROCEDURE — 6360000002 HC RX W HCPCS

## 2019-08-09 RX ORDER — HEPARIN SODIUM 10000 [USP'U]/100ML
8.5 INJECTION, SOLUTION INTRAVENOUS CONTINUOUS
Status: DISCONTINUED | OUTPATIENT
Start: 2019-08-09 | End: 2019-08-10

## 2019-08-09 RX ORDER — SODIUM CHLORIDE 0.9 % (FLUSH) 0.9 %
10 SYRINGE (ML) INJECTION PRN
Status: DISCONTINUED | OUTPATIENT
Start: 2019-08-09 | End: 2019-08-11 | Stop reason: HOSPADM

## 2019-08-09 RX ORDER — IODIXANOL 320 MG/ML
45 INJECTION, SOLUTION INTRAVASCULAR
Status: COMPLETED | OUTPATIENT
Start: 2019-08-09 | End: 2019-08-09

## 2019-08-09 RX ORDER — HEPARIN SODIUM 1000 [USP'U]/ML
3400 INJECTION, SOLUTION INTRAVENOUS; SUBCUTANEOUS ONCE
Status: COMPLETED | OUTPATIENT
Start: 2019-08-09 | End: 2019-08-09

## 2019-08-09 RX ORDER — SODIUM CHLORIDE 9 MG/ML
INJECTION, SOLUTION INTRAVENOUS CONTINUOUS
Status: DISCONTINUED | OUTPATIENT
Start: 2019-08-09 | End: 2019-08-10

## 2019-08-09 RX ORDER — IODIXANOL 320 MG/ML
48 INJECTION, SOLUTION INTRAVASCULAR
Status: DISCONTINUED | OUTPATIENT
Start: 2019-08-09 | End: 2019-08-11 | Stop reason: HOSPADM

## 2019-08-09 RX ORDER — HEPARIN SODIUM AND DEXTROSE 10000; 5 [USP'U]/100ML; G/100ML
250 INJECTION INTRAVENOUS CONTINUOUS
Status: ACTIVE | OUTPATIENT
Start: 2019-08-09 | End: 2019-08-09

## 2019-08-09 RX ORDER — SODIUM CHLORIDE 0.9 % (FLUSH) 0.9 %
10 SYRINGE (ML) INJECTION EVERY 12 HOURS SCHEDULED
Status: DISCONTINUED | OUTPATIENT
Start: 2019-08-09 | End: 2019-08-09 | Stop reason: SDUPTHER

## 2019-08-09 RX ADMIN — PANTOPRAZOLE SODIUM 40 MG: 40 TABLET, DELAYED RELEASE ORAL at 05:55

## 2019-08-09 RX ADMIN — ACETAMINOPHEN 650 MG: 325 TABLET ORAL at 05:55

## 2019-08-09 RX ADMIN — HEPARIN SODIUM 250 UNITS/HR: 10000 INJECTION, SOLUTION INTRAVENOUS at 16:16

## 2019-08-09 RX ADMIN — DULOXETINE HYDROCHLORIDE 60 MG: 60 CAPSULE, DELAYED RELEASE ORAL at 08:58

## 2019-08-09 RX ADMIN — ACETAMINOPHEN 650 MG: 325 TABLET ORAL at 01:26

## 2019-08-09 RX ADMIN — ACETAMINOPHEN 650 MG: 325 TABLET ORAL at 16:22

## 2019-08-09 RX ADMIN — ALTEPLASE 1 MG/HR: 2.2 INJECTION, POWDER, LYOPHILIZED, FOR SOLUTION INTRAVENOUS at 16:16

## 2019-08-09 RX ADMIN — SODIUM CHLORIDE: 9 INJECTION, SOLUTION INTRAVENOUS at 16:15

## 2019-08-09 RX ADMIN — HEPARIN SODIUM 3400 UNITS: 1000 INJECTION INTRAVENOUS; SUBCUTANEOUS at 02:12

## 2019-08-09 RX ADMIN — HEPARIN SODIUM 250 UNITS/HR: 10000 INJECTION, SOLUTION INTRAVENOUS at 16:17

## 2019-08-09 RX ADMIN — IODIXANOL 45 ML: 320 INJECTION, SOLUTION INTRAVASCULAR at 15:47

## 2019-08-09 ASSESSMENT — PAIN - FUNCTIONAL ASSESSMENT
PAIN_FUNCTIONAL_ASSESSMENT: ACTIVITIES ARE NOT PREVENTED

## 2019-08-09 ASSESSMENT — PAIN DESCRIPTION - LOCATION
LOCATION: HEAD

## 2019-08-09 ASSESSMENT — PAIN DESCRIPTION - DESCRIPTORS
DESCRIPTORS: THROBBING
DESCRIPTORS: THROBBING;ACHING

## 2019-08-09 ASSESSMENT — PAIN DESCRIPTION - PAIN TYPE
TYPE: ACUTE PAIN
TYPE: CHRONIC PAIN

## 2019-08-09 ASSESSMENT — PAIN DESCRIPTION - ONSET
ONSET: PROGRESSIVE
ONSET: PROGRESSIVE

## 2019-08-09 ASSESSMENT — PAIN DESCRIPTION - PROGRESSION
CLINICAL_PROGRESSION: NOT CHANGED
CLINICAL_PROGRESSION: GRADUALLY WORSENING
CLINICAL_PROGRESSION: NOT CHANGED

## 2019-08-09 ASSESSMENT — PAIN SCALES - GENERAL
PAINLEVEL_OUTOF10: 8
PAINLEVEL_OUTOF10: 5
PAINLEVEL_OUTOF10: 5
PAINLEVEL_OUTOF10: 0
PAINLEVEL_OUTOF10: 7
PAINLEVEL_OUTOF10: 7
PAINLEVEL_OUTOF10: 8
PAINLEVEL_OUTOF10: 6

## 2019-08-09 ASSESSMENT — PAIN DESCRIPTION - FREQUENCY
FREQUENCY: INTERMITTENT
FREQUENCY: INTERMITTENT
FREQUENCY: CONTINUOUS
FREQUENCY: INTERMITTENT

## 2019-08-09 ASSESSMENT — PAIN DESCRIPTION - ORIENTATION
ORIENTATION: MID
ORIENTATION: MID

## 2019-08-09 NOTE — PROGRESS NOTES
Progress Note  Admit Date: 2019      PCP: Sylvester Zaman MD     CC: F/U for SOB    SUBJECTIVE / Interval History:  Feels better, still SOB on ambulation   needing 3 - 4 L O2  Going for catheter based therapy         Allergies  Keflex [cephalexin]; Adhesive tape; and Morphine    Medications    Scheduled Meds:   pantoprazole  40 mg Oral BID AC    DULoxetine  60 mg Oral Daily    pravastatin  40 mg Oral Nightly    sodium chloride flush  10 mL Intravenous 2 times per day     Continuous Infusions:   heparin (porcine) 6.8 mL/hr (19 0212)       PRN Meds:  ondansetron, sodium chloride flush, magnesium hydroxide, ondansetron, acetaminophen    Vitals    TEMPERATURE:  Current - Temp: 97.4 °F (36.3 °C); Max - Temp  Av.7 °F (36.5 °C)  Min: 97.1 °F (36.2 °C)  Max: 98.5 °F (36.9 °C)  RESPIRATIONS RANGE: Resp  Av.3  Min: 16  Max: 18  PULSE RANGE: Pulse  Av.4  Min: 69  Max: 75  BLOOD PRESSURE RANGE:  Systolic (07USI), XJY:149 , Min:111 , DCZ:941   ; Diastolic (33OVC), MSL:18, Min:69, Max:83    PULSE OXIMETRY RANGE: SpO2  Av.9 %  Min: 94 %  Max: 97 %  24HR INTAKE/OUTPUT:      Intake/Output Summary (Last 24 hours) at 2019 0828  Last data filed at 2019 0600  Gross per 24 hour   Intake 1390.4 ml   Output 1450 ml   Net -59.6 ml       Exam:    Gen: No distress. Eyes: PERRL. No sclera icterus. No conjunctival injection. ENT: No discharge. Pharynx clear. External appearance of ears and nose normal.  Neck: Trachea midline. No obvious mass. Resp: No accessory muscle use. No crackles. No wheezes. No rhonchi. No dullness on percussion. CV: Regular rate. Regular rhythm. No murmur or rub. No edema. GI: Non-tender. Non-distended. No hernia. Skin: Warm, dry, normal texture and turgor. No nodule on exposed extremities. Lymph: No cervical LAD. No supraclavicular LAD. M/S: No cyanosis. No clubbing. No joint deformity. Neuro: Moves all four extremities.  CN 2-12 tested, no defect

## 2019-08-09 NOTE — CARE COORDINATION
Provided free Eliquis and $10 discount card for possible dc this weekend  Electronically signed by Susanne Gaona RN on 8/9/2019 at 11:40 AM

## 2019-08-09 NOTE — PROGRESS NOTES
of the mA/kV was utilized to reduce the radiation dose to as low as reasonably achievable. COMPARISON: None. HISTORY: ORDERING SYSTEM PROVIDED HISTORY: sob, hx of clots TECHNOLOGIST PROVIDED HISTORY: Reason for Exam: sob, hx of clots Acuity: Acute Relevant Medical/Surgical History: hx sqaumous cell ca, open heart surgery FINDINGS: Pulmonary Arteries: Filling defects are identified within all 5 lobar pulmonary arteries, especially within the right lower lobe, with an overall moderate to large clot burden. A saddle embolus is present. Main pulmonary arteries are mildly dilated. Otherwise, no evidence of right heart strain is seen. Mediastinum: No mediastinal lymphadenopathy is seen. Thoracic aorta is normal in caliber, without evidence of dissection. Right thyroid lobe is not visualized. Left thyroid lobe is heterogeneous but otherwise unremarkable. 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 Pacemaker is in place, with the lead extending along the anterior chest wall and the actual pacer seen within the upper anterior abdominal wall. Lungs/pleura: The lungs are clear. Airways are patent. Upper Abdomen: Moderate-sized hiatal hernia. Images of the upper abdomen otherwise unremarkable. Soft Tissues/Bones: No acute bony abnormality identified. No osteolytic or osteoblastic bone lesions. Positive for pulmonary embolism, with a moderate to large clot burden, especially within the right lower lobe. The main pulmonary arteries are mildly dilated, but no otherwise no evidence of right heart strain is seen. Findings were discussed with ANGEL FRANCIS at 9:46 pm on 8/7/2019.      Ultrasound Guided Needle Placement    Result Date: 7/23/2019  Radiology result is complete; follow up with provider / physician office for radiology results     Vl Extremity Venous Bilateral    Result Date: 8/8/2019  Lower Extremities DVT Study  Demographics

## 2019-08-09 NOTE — PROGRESS NOTES
mouth every 8 hours as needed for Nausea 3/30/17  Yes [facility]   b complex vitamins capsule Take 1 capsule by mouth daily   Yes Historical Provider, MD        CURRENT Medications:    pantoprazole (PROTONIX) tablet 40 mg BID AC   DULoxetine (CYMBALTA) extended release capsule 60 mg Daily   ondansetron (ZOFRAN) tablet 4 mg Q8H PRN   pravastatin (PRAVACHOL) tablet 40 mg Nightly   sodium chloride flush 0.9 % injection 10 mL 2 times per day   sodium chloride flush 0.9 % injection 10 mL PRN   magnesium hydroxide (MILK OF MAGNESIA) 400 MG/5ML suspension 30 mL Daily PRN   ondansetron (ZOFRAN) injection 4 mg Q6H PRN   heparin 25,000 units in dextrose 5% 250 mL infusion Continuous   acetaminophen (TYLENOL) tablet 650 mg Q4H PRN       Allergies:  Keflex [cephalexin]; Adhesive tape; and Morphine           Review of Systems: SEE HPI   · Constitutional: no unanticipated weight loss. There's been no change in energy level, sleep pattern, or activity level. No fevers, chills. · Eyes: No visual changes or diplopia. No scleral icterus. · ENT: No Headaches, hearing loss or vertigo. No mouth sores or sore throat. · Cardiovascular: No Chest pain, tightness or discomfort.  No Shortness of breath. No Dyspnea on exertion, Orthopnea, Paroxysmal nocturnal dyspnea or breathlessness at rest.   No Palpitations.  No Syncope ('blackouts', 'faints', 'collapse') or dizziness. · Respiratory: No cough or wheezing, no sputum production. No hematemesis. · Gastrointestinal: No abdominal pain, appetite loss, blood in stools. No change in bowel or bladder habits. · Genitourinary: No dysuria, trouble voiding, or hematuria. · Musculoskeletal:  No gait disturbance, no joint complaints. · Integumentary: No rash or pruritis. · Neurological: No headache, diplopia, change in muscle strength, numbness or tingling. · Psychiatric: No anxiety or depression. · Endocrine: No temperature intolerance.  No excessive thirst, fluid intake, stroke, myocardial infarction or death. The patient and the family members understood the risk and benefits and the details of procedure and would like to go ahead with the procedure. The patient gave informed consent. Thank you for allowing us to participate in the care of Diane Sat. Please do not hesitate to contact me if you have any questions.     Daysi Blackwood MD, MPH    [place] [address]  Ph: [phone]  Fax: [phone]    8/9/2019 10:50 AM

## 2019-08-09 NOTE — PROGRESS NOTES
Clinical Pharmacy Note  Heparin Dosing       Lab Results   Component Value Date    APTT 36.1 08/09/2019     Lab Results   Component Value Date    HGB 13.8 08/08/2019    HCT 42.6 08/08/2019     08/08/2019    INR 0.94 01/11/2016       Current Infusion Rate: 5.1 mL/hr    Plan:  Bolus: 3400 units  Rate: 6.8 mL/hr  Next aPTT: 0830 8/9/19    Pharmacy will continue to monitor and adjust based on aPTT results.

## 2019-08-10 LAB
ANTICARDIOLIPIN IGG ANTIBODY: 4 GPL (ref 0–14)
APTT: 28 SEC (ref 26–36)
APTT: 36.6 SEC (ref 26–36)
APTT: 39.2 SEC (ref 26–36)
CARDIOLIPIN AB IGM: 6 MPL (ref 0–12)
FIBRINOGEN: 291 MG/DL (ref 200–397)
FIBRINOGEN: 332 MG/DL (ref 200–397)
HCT VFR BLD CALC: 39.1 % (ref 36–48)
HCT VFR BLD CALC: 39.4 % (ref 36–48)
HEMOGLOBIN: 12.7 G/DL (ref 12–16)
HEMOGLOBIN: 12.9 G/DL (ref 12–16)
MCH RBC QN AUTO: 29.3 PG (ref 26–34)
MCH RBC QN AUTO: 29.8 PG (ref 26–34)
MCHC RBC AUTO-ENTMCNC: 32.4 G/DL (ref 31–36)
MCHC RBC AUTO-ENTMCNC: 32.8 G/DL (ref 31–36)
MCV RBC AUTO: 90.3 FL (ref 80–100)
MCV RBC AUTO: 90.8 FL (ref 80–100)
PDW BLD-RTO: 15.8 % (ref 12.4–15.4)
PDW BLD-RTO: 16.3 % (ref 12.4–15.4)
PLATELET # BLD: 161 K/UL (ref 135–450)
PLATELET # BLD: 168 K/UL (ref 135–450)
PMV BLD AUTO: 8 FL (ref 5–10.5)
PMV BLD AUTO: 8.7 FL (ref 5–10.5)
PROTEIN S ANTIGEN, TOTAL: 171 % (ref 63–126)
RBC # BLD: 4.33 M/UL (ref 4–5.2)
RBC # BLD: 4.33 M/UL (ref 4–5.2)
WBC # BLD: 7.7 K/UL (ref 4–11)
WBC # BLD: 8 K/UL (ref 4–11)

## 2019-08-10 PROCEDURE — 2700000000 HC OXYGEN THERAPY PER DAY

## 2019-08-10 PROCEDURE — 6370000000 HC RX 637 (ALT 250 FOR IP): Performed by: INTERNAL MEDICINE

## 2019-08-10 PROCEDURE — 6360000002 HC RX W HCPCS: Performed by: INTERNAL MEDICINE

## 2019-08-10 PROCEDURE — 2140000000 HC CCU INTERMEDIATE R&B

## 2019-08-10 PROCEDURE — 85027 COMPLETE CBC AUTOMATED: CPT

## 2019-08-10 PROCEDURE — 85384 FIBRINOGEN ACTIVITY: CPT

## 2019-08-10 PROCEDURE — 94761 N-INVAS EAR/PLS OXIMETRY MLT: CPT

## 2019-08-10 PROCEDURE — 99232 SBSQ HOSP IP/OBS MODERATE 35: CPT | Performed by: INTERNAL MEDICINE

## 2019-08-10 PROCEDURE — 36415 COLL VENOUS BLD VENIPUNCTURE: CPT

## 2019-08-10 PROCEDURE — 85730 THROMBOPLASTIN TIME PARTIAL: CPT

## 2019-08-10 PROCEDURE — 2580000003 HC RX 258: Performed by: INTERNAL MEDICINE

## 2019-08-10 RX ORDER — HEPARIN SODIUM 1000 [USP'U]/ML
3400 INJECTION, SOLUTION INTRAVENOUS; SUBCUTANEOUS ONCE
Status: COMPLETED | OUTPATIENT
Start: 2019-08-10 | End: 2019-08-10

## 2019-08-10 RX ADMIN — PRAVASTATIN SODIUM 40 MG: 40 TABLET ORAL at 21:20

## 2019-08-10 RX ADMIN — DULOXETINE HYDROCHLORIDE 60 MG: 60 CAPSULE, DELAYED RELEASE ORAL at 08:54

## 2019-08-10 RX ADMIN — PANTOPRAZOLE SODIUM 40 MG: 40 TABLET, DELAYED RELEASE ORAL at 06:48

## 2019-08-10 RX ADMIN — PRAVASTATIN SODIUM 40 MG: 40 TABLET ORAL at 00:57

## 2019-08-10 RX ADMIN — Medication 10 ML: at 21:20

## 2019-08-10 RX ADMIN — PANTOPRAZOLE SODIUM 40 MG: 40 TABLET, DELAYED RELEASE ORAL at 16:56

## 2019-08-10 RX ADMIN — APIXABAN 10 MG: 5 TABLET, FILM COATED ORAL at 10:35

## 2019-08-10 RX ADMIN — ACETAMINOPHEN 650 MG: 325 TABLET ORAL at 14:04

## 2019-08-10 RX ADMIN — HEPARIN SODIUM 3400 UNITS: 1000 INJECTION INTRAVENOUS; SUBCUTANEOUS at 07:15

## 2019-08-10 RX ADMIN — ACETAMINOPHEN 650 MG: 325 TABLET ORAL at 09:39

## 2019-08-10 RX ADMIN — Medication 10 ML: at 08:58

## 2019-08-10 RX ADMIN — APIXABAN 10 MG: 5 TABLET, FILM COATED ORAL at 21:20

## 2019-08-10 RX ADMIN — ACETAMINOPHEN 650 MG: 325 TABLET ORAL at 00:56

## 2019-08-10 RX ADMIN — HEPARIN SODIUM 6.8 ML/HR: 10000 INJECTION, SOLUTION INTRAVENOUS at 00:57

## 2019-08-10 ASSESSMENT — PAIN DESCRIPTION - ONSET
ONSET: ON-GOING
ONSET: ON-GOING
ONSET: GRADUAL
ONSET: ON-GOING
ONSET: ON-GOING

## 2019-08-10 ASSESSMENT — PAIN DESCRIPTION - PAIN TYPE
TYPE: CHRONIC PAIN

## 2019-08-10 ASSESSMENT — PAIN SCALES - GENERAL
PAINLEVEL_OUTOF10: 0
PAINLEVEL_OUTOF10: 4
PAINLEVEL_OUTOF10: 0
PAINLEVEL_OUTOF10: 3
PAINLEVEL_OUTOF10: 6

## 2019-08-10 ASSESSMENT — PAIN DESCRIPTION - LOCATION
LOCATION: HEAD

## 2019-08-10 ASSESSMENT — PAIN DESCRIPTION - PROGRESSION
CLINICAL_PROGRESSION: NOT CHANGED
CLINICAL_PROGRESSION: GRADUALLY IMPROVING
CLINICAL_PROGRESSION: GRADUALLY WORSENING
CLINICAL_PROGRESSION: NOT CHANGED
CLINICAL_PROGRESSION: GRADUALLY IMPROVING

## 2019-08-10 ASSESSMENT — PAIN DESCRIPTION - FREQUENCY
FREQUENCY: CONTINUOUS

## 2019-08-10 ASSESSMENT — PAIN - FUNCTIONAL ASSESSMENT
PAIN_FUNCTIONAL_ASSESSMENT: ACTIVITIES ARE NOT PREVENTED

## 2019-08-10 ASSESSMENT — PAIN DESCRIPTION - DESCRIPTORS
DESCRIPTORS: THROBBING;ACHING
DESCRIPTORS: THROBBING;ACHING
DESCRIPTORS: DULL;HEADACHE

## 2019-08-10 NOTE — PROGRESS NOTES
magnesium hydroxide (MILK OF MAGNESIA) 400 MG/5ML suspension 30 mL  30 mL Oral Daily PRN Doug David MD        ondansetron [facility]) injection 4 mg  4 mg Intravenous Q6H PRN Doug David MD        acetaminophen (TYLENOL) tablet 650 mg  650 mg Oral Q4H PRN Doug David MD   650 mg at 08/10/19 0056       LABS:     CBC:   Lab Results   Component Value Date    WBC 8.0 08/10/2019    HGB 12.9 08/10/2019    HCT 39.4 08/10/2019    MCV 90.8 08/10/2019     08/10/2019    LYMPHOPCT 19.8 08/07/2019    RBC 4.33 08/10/2019    MCH 29.8 08/10/2019    MCHC 32.8 08/10/2019    RDW 15.8 (H) 08/10/2019    NEUTOPHILPCT 72.6 08/07/2019    MONOPCT 4.7 08/07/2019    EOSPCT 3.0 08/01/2011    BASOPCT 0.7 08/07/2019    NEUTROABS 8.2 (H) 08/07/2019    LYMPHSABS 2.3 08/07/2019    MONOSABS 0.5 08/07/2019    EOSABS 0.2 08/07/2019    BASOSABS 0.1 08/07/2019       CMP:   Lab Results   Component Value Date     08/08/2019    K 4.3 08/08/2019     08/08/2019    CO2 24 08/08/2019    BUN 21 08/08/2019    CREATININE 0.7 08/08/2019    GLUCOSE 104 08/08/2019    CALCIUM 9.2 08/08/2019    PROT 7.7 08/07/2019    PROT 8.0 10/16/2012    LABALBU 4.5 08/07/2019    BILITOT 0.4 08/07/2019    ALKPHOS 119 08/07/2019    AST 17 08/07/2019    ALT 16 08/07/2019          ASSESSMENT:         Patient Active Problem List   Diagnosis Code    Herpes zoster B02.9    Neuropathy G62.9    Sick sinus syndrome ([facility] [address] I49.5    Classic migraine G43. 109    Degenerated intervertebral disc ABI5830    GERD (gastroesophageal reflux disease) K21.9    [facility]umatoid arthritis of hand ([facility] M06.9    Scoliosis M41.9    Chondromalacia of patella M22.40    Primary localized osteoarthrosis, lower leg M17.10    Trochanteric bursitis of right hip M70.61    Left hip pain M25.552    Knee pain M25.569    Enchondroma of femur D16.20    Right ankle sprain S93.401A    Fibula fracture S82.409A    Family history of type 2 diabetes mellitus in father Z80.1    Skin cancer C44.90  Splenomegaly R16.1    Gastric ulcer K25.9    Primary osteoarthritis of both knees M17.0    Trochanteric bursitis, left hip M70.62    Cough variant asthma J45.991    Multiple fractures of ribs, bilateral, subsequent encounter for fracture with delayed healing S22.43XG    Greater trochanteric bursitis of left hip M70.62    Right rotator cuff tendonitis M75.81    Rotator cuff tendonitis, left M75.82    Postmenopausal osteoporosis M81.0    Multiple thyroid nodules E04.2    S/P partial thyroidectomy Z98.890    History of bisphosphonate therapy Z92.29    Mixed hyperlipidemia E78.2    Acute suppr otitis media w/o spon rupt ear drum, right ear H66.001    Acute swimmer's ear of left side H60.332    Recurrent acute suppurative otitis media without spontaneous rupture of tympanic membrane of both sides H66.006    Acute rhinosinusitis J01.90    Allergic arthritis of left hip M13.852    Myofascial pain M79.18    Acute saddle pulmonary embolism without acute cor pulmonale (HCC) I26.92    Pulmonary embolism affecting pregnancy in first trimester O88.211    Acute saddle pulmonary embolism with acute cor pulmonale (HCC) I26.02         PLAN:       Eliquis to be started, pharmacy to monitor, should stop heparin today since Eliquis is started. Dr. Miah Terry will be back on Mon.     Joann Vargas

## 2019-08-10 NOTE — PROGRESS NOTES
Clinical Pharmacy Note  Heparin Dosing       Lab Results   Component Value Date    APTT 70.2 08/09/2019     Lab Results   Component Value Date    HGB 13.1 08/09/2019    HCT 40.0 08/09/2019     08/09/2019    INR 0.94 01/11/2016       Current Infusion Rate: 0 mL/hr    Plan:  EKOS Catheters to be removed on 08/09/19 at 2200. RN to collect an aPTT just prior to EKOS catheter removal at 2200. Per don Harris to resume high-dose weight based heparin 1-hour after EKOS Catheters removed. No initial Bolus. Resume IV heparin at the previously established therapeutic rate of 6.8 mL/hr. Bolus: None  Rate:  6.8 mL/hr  Next aPTT: 08/10/19 0500    Pharmacy will continue to monitor and adjust based on aPTT results.     Val Liu, PharmD, BCPS  8/9/2019 8:02 PM

## 2019-08-10 NOTE — PROGRESS NOTES
1 capsule by mouth daily   Yes Historical Provider, MD        CURRENT Medications:    apixaban (ELIQUIS) tablet 10 mg BID   sodium chloride flush 0.9 % injection 10 mL PRN   iodixanol (VISIPAQUE) 320 MG/ML injection 48 mL ONCE PRN   pantoprazole (PROTONIX) tablet 40 mg BID AC   DULoxetine (CYMBALTA) extended release capsule 60 mg Daily   ondansetron (ZOFRAN) tablet 4 mg Q8H PRN   pravastatin (PRAVACHOL) tablet 40 mg Nightly   sodium chloride flush 0.9 % injection 10 mL 2 times per day   magnesium hydroxide (MILK OF MAGNESIA) 400 MG/5ML suspension 30 mL Daily PRN   ondansetron (ZOFRAN) injection 4 mg Q6H PRN   acetaminophen (TYLENOL) tablet 650 mg Q4H PRN       Allergies:  Keflex [cephalexin]; Adhesive tape; and Morphine           Review of Systems: SEE HPI   · Constitutional: no unanticipated weight loss. There's been no change in energy level, sleep pattern, or activity level. No fevers, chills. · Eyes: No visual changes or diplopia. No scleral icterus. · ENT: No Headaches, hearing loss or vertigo. No mouth sores or sore throat. · Cardiovascular: No Chest pain, tightness or discomfort.  No Shortness of breath. No Dyspnea on exertion, Orthopnea, Paroxysmal nocturnal dyspnea or breathlessness at rest.   No Palpitations.  No Syncope ('blackouts', 'faints', 'collapse') or dizziness. · Respiratory: No cough or wheezing, no sputum production. No hematemesis. · Gastrointestinal: No abdominal pain, appetite loss, blood in stools. No change in bowel or bladder habits. · Genitourinary: No dysuria, trouble voiding, or hematuria. · Musculoskeletal:  No gait disturbance, no joint complaints. · Integumentary: No rash or pruritis. · Neurological: No headache, diplopia, change in muscle strength, numbness or tingling. · Psychiatric: No anxiety or depression. · Endocrine: No temperature intolerance. No excessive thirst, fluid intake, or urination. No tremor.   · Hematologic/Lymphatic: No abnormal bruising or

## 2019-08-10 NOTE — PROGRESS NOTES
Progress Note  Admit Date: 2019      PCP: Ramsey Carballo MD     CC: F/U for SOB    SUBJECTIVE / Interval History:  Feels better, still SOB on ambulation, improved   needing 2 L O2          Allergies  Keflex [cephalexin]; Adhesive tape; and Morphine    Medications    Scheduled Meds:   pantoprazole  40 mg Oral BID AC    DULoxetine  60 mg Oral Daily    pravastatin  40 mg Oral Nightly    sodium chloride flush  10 mL Intravenous 2 times per day     Continuous Infusions:   sodium chloride 50 mL/hr at 19 1615    heparin (porcine) 8.5 mL/hr (08/10/19 0715)       PRN Meds:  sodium chloride flush, iodixanol, ondansetron, magnesium hydroxide, ondansetron, acetaminophen    Vitals    TEMPERATURE:  Current - Temp: 98 °F (36.7 °C); Max - Temp  Av.2 °F (36.8 °C)  Min: 97.4 °F (36.3 °C)  Max: 98.7 °F (37.1 °C)  RESPIRATIONS RANGE: Resp  Av.2  Min: 14  Max: 22  PULSE RANGE: Pulse  Av.1  Min: 70  Max: 77  BLOOD PRESSURE RANGE:  Systolic (67GTO), BBJ:875 , Min:98 , RUP:733   ; Diastolic (75WFG), RZH:78, Min:65, Max:86    PULSE OXIMETRY RANGE: SpO2  Av.8 %  Min: 96 %  Max: 99 %  24HR INTAKE/OUTPUT:      Intake/Output Summary (Last 24 hours) at 8/10/2019 0744  Last data filed at 8/10/2019 9562  Gross per 24 hour   Intake 1215.3 ml   Output 925 ml   Net 290.3 ml       Exam:    Gen: No distress. Eyes: PERRL. No sclera icterus. No conjunctival injection. ENT: No discharge. Pharynx clear. External appearance of ears and nose normal.  Neck: Trachea midline. No obvious mass. Resp: No accessory muscle use. No crackles. No wheezes. No rhonchi. No dullness on percussion. CV: Regular rate. Regular rhythm. No murmur or rub. No edema. GI: Non-tender. Non-distended. No hernia. Skin: Warm, dry, normal texture and turgor. No nodule on exposed extremities. Lymph: No cervical LAD. No supraclavicular LAD. M/S: No cyanosis. No clubbing. No joint deformity. Neuro: Moves all four extremities. Culture  No results for input(s): LABURIN in the last 72 hours. RADIOLOGY:    VL Extremity Venous Bilateral   Final Result      CT CHEST PULMONARY EMBOLISM W CONTRAST   Final Result   Positive for pulmonary embolism, with a moderate to large clot burden,   especially within the right lower lobe. The main pulmonary arteries are   mildly dilated, but no otherwise no evidence of right heart strain is seen. Findings were discussed with ANGEL FRANCIS at 9:46 pm on 8/7/2019. XR CHEST STANDARD (2 VW)   Final Result   No acute process. CONSULTS:    IP CONSULT TO HOSPITALIST  IP CONSULT TO ONCOLOGY  IP CONSULT TO CARDIOLOGY    ASSESSMENT AND PLAN:      Active Problems:    Acute saddle pulmonary embolism without acute cor pulmonale ([address]    Pulmonary embolism affecting pregnancy in first trimester    Acute saddle pulmonary embolism with acute cor pulmonale (HCC)  Resolved Problems:    * No resolved hospital problems. *    Patient is a 56-year-old female with a past medical history of GERD, pacemaker placement for SA node dysfunction, remote history of ASD which was repaired when she was 10year-old who presented with shortness of breath and near syncope. She also has some swelling of her leg. Patient had a remote history of DVT as a complication of her pacemaker. Patient has a family history of DVT. In the ER patient was found to have acute saddle PE with a large clot burden and was started on the heparin drip. She was also found to have new hypoxic respiratory failure. Her shortness of breath did not improve hence she underwent pulmonary angiogram which showed extensive clot burden and EKOS tpa.     Acute saddle  PE, with large clot burden - s/p  catheter based tpa - switch heparin to Eliquis ( d/w cardiology )   - ECHO to evaluate RV strain- unable to see RV well  - venous duplex + for DVT  -  hyper coag evaluation pending   - sedentary work style/ no recent travel      Acute hypoxic rep failure- wean o2 as tolerated   -O2 sat 84 % on RA, needing 2 L o2 to main sat > 90 %     Hyperlipidemia  - ct stain     Severe GERD  - ct Protonix BID    H/o pacemaker for Sa node dysfunction    Remote h/o ASD- bubble study +     DVT Prophylaxis: heparin > eliquis  Diet: DIET CARDIAC;  Code Status: Full Code        Discharge plan -tomorrow    The patient and / or the family were informed of the results of any tests, a time was given to answer questions, a plan was proposed and they agreed with plan. Discussed with consulting physicians, nursing and social work     The note was completed using EMR. Every effort was made to ensure accuracy; however, inadvertent computerized transcription errors may be present.        Opal Mathis MD

## 2019-08-11 VITALS
OXYGEN SATURATION: 98 % | BODY MASS INDEX: 28.25 KG/M2 | DIASTOLIC BLOOD PRESSURE: 78 MMHG | SYSTOLIC BLOOD PRESSURE: 125 MMHG | HEART RATE: 73 BPM | HEIGHT: 72 IN | TEMPERATURE: 98.2 F | WEIGHT: 208.56 LBS | RESPIRATION RATE: 18 BRPM

## 2019-08-11 PROBLEM — J96.01 ACUTE RESPIRATORY FAILURE WITH HYPOXIA (HCC): Status: ACTIVE | Noted: 2019-08-11

## 2019-08-11 PROBLEM — I82.409 ACUTE DEEP VEIN THROMBOSIS (DVT) OF LOWER EXTREMITY (HCC): Status: ACTIVE | Noted: 2019-08-11

## 2019-08-11 LAB
BETA-2 GLYCOPROTEIN 1 IGG ANTIBODY: 0 SGU (ref 0–20)
BETA-2 GLYCOPROTEIN 1 IGM ANTIBODY: 2 SMU (ref 0–20)
DRVVT CONFIRMATION TEST: ABNORMAL RATIO
DRVVT SCREEN: 34 SEC (ref 33–44)
DRVVT,DIL: ABNORMAL SEC (ref 33–44)
HEXAGONAL PHOSPHOLIPID NEUTRALIZAT TEST: ABNORMAL
LUPUS ANTICOAG INTERP: ABNORMAL
PLT NEUTA: ABNORMAL
PROTEIN C ANTIGEN: 155 % (ref 63–153)
PROTEIN C FUNCTIONAL: 166 % (ref 83–168)
PROTEIN S, FUNCTIONAL: 96 % (ref 57–131)
PT D: 13.5 SEC (ref 12–15.5)
PTT D: >150 SEC (ref 32–48)
PTT-D CORR REFLEX: ABNORMAL SEC (ref 32–48)
PTT-HEPARIN NEUTRALIZED: 40 SEC (ref 32–48)
REPTILASE TIME: 17.2 SEC
THROMBIN TIME: >150 SEC (ref 14.7–19.5)

## 2019-08-11 PROCEDURE — 6370000000 HC RX 637 (ALT 250 FOR IP): Performed by: INTERNAL MEDICINE

## 2019-08-11 PROCEDURE — 2580000003 HC RX 258: Performed by: INTERNAL MEDICINE

## 2019-08-11 RX ADMIN — ACETAMINOPHEN 650 MG: 325 TABLET ORAL at 04:57

## 2019-08-11 RX ADMIN — PANTOPRAZOLE SODIUM 40 MG: 40 TABLET, DELAYED RELEASE ORAL at 06:26

## 2019-08-11 RX ADMIN — DULOXETINE HYDROCHLORIDE 60 MG: 60 CAPSULE, DELAYED RELEASE ORAL at 08:33

## 2019-08-11 RX ADMIN — Medication 10 ML: at 08:33

## 2019-08-11 RX ADMIN — APIXABAN 10 MG: 5 TABLET, FILM COATED ORAL at 08:33

## 2019-08-11 ASSESSMENT — PAIN DESCRIPTION - DESCRIPTORS: DESCRIPTORS: DULL;SHARP

## 2019-08-11 ASSESSMENT — PAIN SCALES - GENERAL
PAINLEVEL_OUTOF10: 0
PAINLEVEL_OUTOF10: 5
PAINLEVEL_OUTOF10: 0
PAINLEVEL_OUTOF10: 0

## 2019-08-11 ASSESSMENT — PAIN DESCRIPTION - LOCATION: LOCATION: HEAD

## 2019-08-11 ASSESSMENT — PAIN DESCRIPTION - ORIENTATION: ORIENTATION: ANTERIOR;LEFT

## 2019-08-11 ASSESSMENT — PAIN - FUNCTIONAL ASSESSMENT
PAIN_FUNCTIONAL_ASSESSMENT: ACTIVITIES ARE NOT PREVENTED
PAIN_FUNCTIONAL_ASSESSMENT: ACTIVITIES ARE NOT PREVENTED

## 2019-08-11 ASSESSMENT — PAIN DESCRIPTION - PAIN TYPE: TYPE: ACUTE PAIN

## 2019-08-11 ASSESSMENT — PAIN DESCRIPTION - FREQUENCY: FREQUENCY: CONTINUOUS

## 2019-08-11 NOTE — PROGRESS NOTES
08/10/2019    1915 - Shift hand-off done w/ off-going MALAIKA Sanchez. Visitors are at the bedside. 2130  - Assessment completed. She's alert and oriented, Atrial Paced on the monitor, on room air, and denies any numbness nor any tingling. She also denies any pain and verbalizes that she doesn't have SOB whenever she's ambulating. She had been independently walking since the previous shift. 2230 - Resting. 08/11/2019    0030 - Just came from the bathroom to urinate. She still denies any pain nor any SOB. No needs at the moment. She plans on going back to sleep.    0200 - Asleep. 4231 - Reassessment unchanged. She verbalized that she slept well tonight. She still denies any SOB. She has a \" 4 or 5/10 dull/sharp\" L anterior chronic headache.    0457 - Tylenol 650 mg PO given for her headache.    0620 - Sleeping and not in any distress. 0662 - Bedside shift hand-off done w/ oncoming MALAIKA Sanchez to assume pt. care. She is currently sleeping right now.     Electronically signed by Leta Rich RN on 8/11/2019 at 7:20 AM

## 2019-08-12 LAB
BLOOD CULTURE, ROUTINE: NORMAL
FACTOR V LEIDEN: NEGATIVE
PROTHROMBIN G20210A MUTATION: NEGATIVE
PT PCR SPECIMEN: NORMAL
SPECIMEN: NORMAL

## 2019-08-13 ENCOUNTER — TELEPHONE (OUTPATIENT)
Dept: PRIMARY CARE CLINIC | Age: 59
End: 2019-08-13

## 2019-08-13 LAB — CULTURE, BLOOD 2: NORMAL

## 2019-08-16 PROBLEM — R76.0 LUPUS ANTICOAGULANT POSITIVE: Status: ACTIVE | Noted: 2019-08-16

## 2019-09-11 NOTE — PROGRESS NOTES
symmetric  Carotid  Femoral   Abdomen and Gastrointestinal:   Soft, non-tender, bowel sounds active. Liver and Spleen  Masses   Musculoskeletal: No muscle wasting  Back  Gait   Extremities: Extremities normal, atraumatic. No cyanosis or edema. No cyanosis clubbing       Skin: Inspection and palpation performed, no rashes or lesions. Pysch: Normal mood and affect. Alert and oriented to time place person   Neurologic: Normal gross motor and sensory exam.       Labs     All labs have been reviewed    Lab Results   Component Value Date    WBC 7.7 08/10/2019    RBC 4.33 08/10/2019    HGB 12.7 08/10/2019    HCT 39.1 08/10/2019    MCV 90.3 08/10/2019    RDW 16.3 08/10/2019     08/10/2019     Lab Results   Component Value Date     2019    K 4.3 2019     2019    CO2 24 2019    BUN 21 2019    CREATININE 0.7 2019    GFRAA >60 2019    GFRAA >60 2013    AGRATIO 1.6 2019    LABGLOM >60 2019    GLUCOSE 104 2019    PROT 7.7 2019    PROT 8.0 10/16/2012    CALCIUM 9.2 2019    BILITOT 0.4 2019    ALKPHOS 119 2019    AST 17 2019    ALT 16 2019     No results found for: PTINR  Lab Results   Component Value Date    LABA1C 5.6 2019     Lab Results   Component Value Date    CKTOTAL 31 2017    TROPONINI <0.01 2019       Cardiac, Vascular and Imaging Data all Personally Reviewed in Detail by Myself      EK19    Stress Test: none       BLE venous duplex:19   Summary        Normal venous duplex study of the right lower extremity. There is no    evidence of deep or superficial venous thrombosis.        In the left lower extremity, there is thrombus in the soleal vein as well as    in the posterior tibial vein.  While the left popliteal vein appears to be    patent, the blood flow is distorted and appears to be low flow     Echocardiogram: 19   Summary   Left ventricular cavity size is

## 2019-09-12 ENCOUNTER — OFFICE VISIT (OUTPATIENT)
Dept: CARDIOLOGY CLINIC | Age: 59
End: 2019-09-12
Payer: COMMERCIAL

## 2019-09-12 VITALS
DIASTOLIC BLOOD PRESSURE: 74 MMHG | HEART RATE: 79 BPM | HEIGHT: 72 IN | WEIGHT: 214.4 LBS | BODY MASS INDEX: 29.04 KG/M2 | SYSTOLIC BLOOD PRESSURE: 132 MMHG | OXYGEN SATURATION: 98 %

## 2019-09-12 DIAGNOSIS — I26.92 ACUTE SADDLE PULMONARY EMBOLISM WITHOUT ACUTE COR PULMONALE (HCC): Primary | ICD-10-CM

## 2019-09-12 DIAGNOSIS — I82.492 ACUTE DEEP VEIN THROMBOSIS (DVT) OF OTHER SPECIFIED VEIN OF LEFT LOWER EXTREMITY (HCC): ICD-10-CM

## 2019-09-12 PROCEDURE — 99214 OFFICE O/P EST MOD 30 MIN: CPT | Performed by: INTERNAL MEDICINE

## 2019-09-16 ENCOUNTER — OFFICE VISIT (OUTPATIENT)
Dept: PRIMARY CARE CLINIC | Age: 59
End: 2019-09-16
Payer: COMMERCIAL

## 2019-09-16 VITALS
WEIGHT: 214 LBS | BODY MASS INDEX: 28.23 KG/M2 | SYSTOLIC BLOOD PRESSURE: 125 MMHG | HEART RATE: 73 BPM | OXYGEN SATURATION: 98 % | DIASTOLIC BLOOD PRESSURE: 83 MMHG | RESPIRATION RATE: 18 BRPM | TEMPERATURE: 97.8 F

## 2019-09-16 DIAGNOSIS — E78.00 HYPERCHOLESTEROLEMIA: ICD-10-CM

## 2019-09-16 DIAGNOSIS — E55.9 VITAMIN D DEFICIENCY: ICD-10-CM

## 2019-09-16 DIAGNOSIS — Z23 NEED FOR INFLUENZA VACCINATION: ICD-10-CM

## 2019-09-16 DIAGNOSIS — R19.5 OCCULT BLOOD POSITIVE STOOL: ICD-10-CM

## 2019-09-16 DIAGNOSIS — R51.9 LEFT-SIDED HEADACHE: ICD-10-CM

## 2019-09-16 DIAGNOSIS — E53.8 VITAMIN B 12 DEFICIENCY: ICD-10-CM

## 2019-09-16 DIAGNOSIS — Z13.1 DIABETES MELLITUS SCREENING: ICD-10-CM

## 2019-09-16 DIAGNOSIS — R53.83 OTHER FATIGUE: ICD-10-CM

## 2019-09-16 DIAGNOSIS — M54.31 SCIATICA, RIGHT SIDE: Primary | ICD-10-CM

## 2019-09-16 DIAGNOSIS — R10.11 RUQ PAIN: ICD-10-CM

## 2019-09-16 LAB
A/G RATIO: 1.5 (ref 1.1–2.2)
ALBUMIN SERPL-MCNC: 3.9 G/DL (ref 3.4–5)
ALP BLD-CCNC: 106 U/L (ref 40–129)
ALT SERPL-CCNC: 19 U/L (ref 10–40)
ANION GAP SERPL CALCULATED.3IONS-SCNC: 13 MMOL/L (ref 3–16)
AST SERPL-CCNC: 15 U/L (ref 15–37)
BASOPHILS ABSOLUTE: 0 K/UL (ref 0–0.2)
BASOPHILS RELATIVE PERCENT: 0.5 %
BILIRUB SERPL-MCNC: 0.3 MG/DL (ref 0–1)
BUN BLDV-MCNC: 23 MG/DL (ref 7–20)
CALCIUM SERPL-MCNC: 9.2 MG/DL (ref 8.3–10.6)
CHLORIDE BLD-SCNC: 104 MMOL/L (ref 99–110)
CHOLESTEROL, TOTAL: 199 MG/DL (ref 0–199)
CO2: 25 MMOL/L (ref 21–32)
CREAT SERPL-MCNC: 0.7 MG/DL (ref 0.6–1.2)
EOSINOPHILS ABSOLUTE: 0.1 K/UL (ref 0–0.6)
EOSINOPHILS RELATIVE PERCENT: 1.9 %
GFR AFRICAN AMERICAN: >60
GFR NON-AFRICAN AMERICAN: >60
GLOBULIN: 2.6 G/DL
GLUCOSE BLD-MCNC: 98 MG/DL (ref 70–99)
HCT VFR BLD CALC: 39.3 % (ref 36–48)
HDLC SERPL-MCNC: 56 MG/DL (ref 40–60)
HEMOGLOBIN: 12.7 G/DL (ref 12–16)
LDL CHOLESTEROL CALCULATED: 121 MG/DL
LYMPHOCYTES ABSOLUTE: 2.2 K/UL (ref 1–5.1)
LYMPHOCYTES RELATIVE PERCENT: 29.4 %
MCH RBC QN AUTO: 30.2 PG (ref 26–34)
MCHC RBC AUTO-ENTMCNC: 32.4 G/DL (ref 31–36)
MCV RBC AUTO: 93.2 FL (ref 80–100)
MONOCYTES ABSOLUTE: 0.3 K/UL (ref 0–1.3)
MONOCYTES RELATIVE PERCENT: 4.2 %
NEUTROPHILS ABSOLUTE: 4.8 K/UL (ref 1.7–7.7)
NEUTROPHILS RELATIVE PERCENT: 64 %
PDW BLD-RTO: 17.3 % (ref 12.4–15.4)
PLATELET # BLD: 314 K/UL (ref 135–450)
PMV BLD AUTO: 8 FL (ref 5–10.5)
POTASSIUM SERPL-SCNC: 4.3 MMOL/L (ref 3.5–5.1)
RBC # BLD: 4.21 M/UL (ref 4–5.2)
SODIUM BLD-SCNC: 142 MMOL/L (ref 136–145)
TOTAL PROTEIN: 6.5 G/DL (ref 6.4–8.2)
TRIGL SERPL-MCNC: 112 MG/DL (ref 0–150)
TSH REFLEX FT4: 1.5 UIU/ML (ref 0.27–4.2)
VITAMIN B-12: 700 PG/ML (ref 211–911)
VITAMIN D 25-HYDROXY: 49.1 NG/ML
VLDLC SERPL CALC-MCNC: 22 MG/DL
WBC # BLD: 7.5 K/UL (ref 4–11)

## 2019-09-16 PROCEDURE — 99214 OFFICE O/P EST MOD 30 MIN: CPT | Performed by: INTERNAL MEDICINE

## 2019-09-16 PROCEDURE — 90686 IIV4 VACC NO PRSV 0.5 ML IM: CPT | Performed by: INTERNAL MEDICINE

## 2019-09-16 PROCEDURE — 90471 IMMUNIZATION ADMIN: CPT | Performed by: INTERNAL MEDICINE

## 2019-09-16 RX ORDER — METHYLPREDNISOLONE 4 MG/1
TABLET ORAL
Qty: 1 KIT | Refills: 0 | Status: SHIPPED | OUTPATIENT
Start: 2019-09-16 | End: 2019-09-22

## 2019-09-16 RX ORDER — GABAPENTIN 300 MG/1
300 CAPSULE ORAL 3 TIMES DAILY
Qty: 90 CAPSULE | Refills: 5 | Status: SHIPPED | OUTPATIENT
Start: 2019-09-16 | End: 2019-10-02 | Stop reason: SINTOL

## 2019-09-16 ASSESSMENT — ENCOUNTER SYMPTOMS
RHINORRHEA: 0
CHEST TIGHTNESS: 0
PHOTOPHOBIA: 0
NAUSEA: 0
BACK PAIN: 1
STRIDOR: 0
RECTAL PAIN: 0
TROUBLE SWALLOWING: 0
EYE PAIN: 0
DIARRHEA: 0
SINUS PRESSURE: 0
ANAL BLEEDING: 0
ABDOMINAL DISTENTION: 0
APNEA: 0
SCALP TENDERNESS: 0
WHEEZING: 0
ABDOMINAL PAIN: 0
EYE ITCHING: 0
COUGH: 0
SORE THROAT: 0
CONSTIPATION: 0
CHOKING: 0
EYE DISCHARGE: 0
VOMITING: 0
VOICE CHANGE: 0
EYE REDNESS: 0
BLOOD IN STOOL: 0

## 2019-09-16 ASSESSMENT — PATIENT HEALTH QUESTIONNAIRE - PHQ9
2. FEELING DOWN, DEPRESSED OR HOPELESS: 0
SUM OF ALL RESPONSES TO PHQ QUESTIONS 1-9: 0
1. LITTLE INTEREST OR PLEASURE IN DOING THINGS: 0
SUM OF ALL RESPONSES TO PHQ QUESTIONS 1-9: 0
SUM OF ALL RESPONSES TO PHQ9 QUESTIONS 1 & 2: 0

## 2019-09-16 NOTE — PROGRESS NOTES
2019     Jose Elias Beal (:  1960) is a 60 y.o. female, here for evaluation of the following medical concerns:    Headache    This is a chronic problem. Episode onset: pain past  3 weeks. The problem occurs intermittently. The problem has been unchanged. The pain is located in the left unilateral region. The pain does not radiate. The quality of the pain is described as throbbing. The pain is at a severity of 7/10. The pain is severe. Associated symptoms include back pain. Pertinent negatives include no abdominal pain, abnormal behavior, anorexia, coughing, dizziness, ear pain, eye pain, eye redness, fever, hearing loss, loss of balance, muscle aches, nausea, numbness, photophobia, rhinorrhea, scalp tenderness, seizures, sinus pressure, sore throat, tingling, tinnitus or vomiting. Back Pain   This is a chronic problem. The current episode started more than 1 year ago. The problem occurs constantly. The problem has been waxing and waning since onset. The pain is present in the lumbar spine. The quality of the pain is described as aching. The pain radiates to the left knee and left thigh. The pain is at a severity of 7/10. The pain is severe. The symptoms are aggravated by bending and twisting. Stiffness is present all day. Associated symptoms include headaches. Pertinent negatives include no abdominal pain, chest pain, dysuria, fever, numbness, pelvic pain or tingling. Treatments tried: seeing chiropractor and helping but exacerbation this week end without lifting or injury. The treatment provided mild relief. Pulmonary emboli: She presents for evaluation and treatment of pulmonary emboli diagnosed 19 when presented to er with sob for 10 days and severe on day of admission. Presented with saddle embolus and hypoxia. . Symptoms of sob , hypotension , hypoxemia have resolved. . She denies chest pain, located no chest pain but epigastric pain on exam., constant cough, difficulty breathing, ([facility] [address]    Acute deep vein thrombosis (DVT) of lower extremity (HCC)    Lupus anticoagulant positive     Allergies   Allergen Reactions    Keflex [Cephalexin] Nausea And Vomiting     vomiting    Adhesive Tape Other (See Comments)     Red & painful-blisters-severe    Morphine Other (See Comments)     Hyper. Pt refuse       Review of Systems   Constitutional: Negative. Negative for fatigue and fever. HENT: Negative for congestion, dental problem, drooling, ear discharge, ear pain, hearing loss, postnasal drip, rhinorrhea, sinus pressure, sore throat, tinnitus, trouble swallowing and voice change. Hoarseness resolved    Goiter stable per check 2017. Eyes: Negative for photophobia, pain, discharge, redness, itching and visual disturbance. Wears glasses. Tested two years ago. No glaucoma. Some floaters, but sees well with glasses. Respiratory: Negative for apnea, cough, choking, chest tightness, wheezing and stridor. MAGGIE on bi pap and followed by Gabriella Geiger for one year. Able to walk up a flight a stairs. Asthma stable on Symbicort. Chest xray on 9/15/17 shows healing of ribs slowly. Cardiovascular: Negative for chest pain and leg swelling. Pacemaker 1998 and replaced 2007, and 16.  [facility] manages by Dr. Alfonso Ibanez. Rib fracture pain is slowly resolving. Gastrointestinal: Negative for abdominal distention, abdominal pain, anal bleeding, anorexia, blood in stool, constipation, diarrhea, nausea, rectal pain and vomiting. Ventra hernia ruq and pain with lifting. Patient has pain with lifting. GERD not relieved on protonix bid. NO dysphagia. Endocrine:        Prediabetes history. Genitourinary: Negative for decreased urine volume, dysuria, genital sores, hematuria, menstrual problem, pelvic pain, urgency, vaginal bleeding and vaginal discharge. Menarche at age 12.   Age of first child age 29      No capsule by mouth once a week Vitamin D2  Pawel Ingram MD   b complex vitamins capsule Take 1 capsule by mouth daily  Historical Provider, MD        Social History     Tobacco Use    Smoking status: Never Smoker    Smokeless tobacco: Never Used   Substance Use Topics    Alcohol use: Yes     Comment: Ocassional-rare        Vitals:    09/16/19 0731   BP: 125/83   Site: Left Upper Arm   Position: Sitting   Cuff Size: Large Adult   Pulse: 73   Resp: 18   Temp: 97.8 °F (36.6 °C)   TempSrc: Oral   SpO2: 98%  Comment: room air   Weight: 214 lb (97.1 kg)     Estimated body mass index is 28.23 kg/m² as calculated from the following:    Height as of 9/12/19: 6' 1\" (1.854 m). Weight as of this encounter: 214 lb (97.1 kg). Physical Exam   Constitutional: She is oriented to person, place, and time. She appears well-developed and well-nourished. No distress. HENT:   Head: Normocephalic and atraumatic. Right Ear: External ear normal.   Left Ear: External ear normal.   Mouth/Throat: Oropharynx is clear and moist.   Eyes: Pupils are equal, round, and reactive to light. Conjunctivae and EOM are normal. Right eye exhibits no discharge. Left eye exhibits no discharge. No scleral icterus. Neck: Normal range of motion. Neck supple. No JVD present. No tracheal deviation present. No thyromegaly present. Cardiovascular: Normal rate, normal heart sounds and intact distal pulses. Exam reveals no gallop. No murmur heard. Pulmonary/Chest: Effort normal and breath sounds normal. No respiratory distress. She has no wheezes. She has no rales. She exhibits no tenderness. Abdominal: Soft. Bowel sounds are normal. She exhibits no distension and no mass. There is tenderness. There is no rebound and no guarding. ruq tenderness. Musculoskeletal: Normal range of motion. She exhibits no edema or tenderness. Lymphadenopathy:     She has no cervical adenopathy.    Neurological: She is alert and oriented to person, place, and time. She has normal reflexes. No cranial nerve deficit. She exhibits normal muscle tone. Coordination normal.   Skin: Skin is warm and dry. No rash noted. She is not diaphoretic. No erythema. No pallor. Nail growing in better but still fungus in the new nail. Psychiatric: She has a normal mood and affect. Her behavior is normal. Judgment and thought content normal.       ASSESSMENT/PLAN:   Diagnosis Orders   1. Sciatica, left side. Patient is seeing chiropractor will give Medrol Dosepak. This would be the third time this year and start gabapentin for pain control. gabapentin (NEURONTIN) 300 MG capsule    methylPREDNISolone (MEDROL DOSEPACK) 4 MG tablet   2. RUQ pain noted on exam we will get ultrasound. Has not had nausea. Stool Hemoccult was mildlypositive we will get. Patient has GERD and states symptoms are controlled with omeprazole twice a day and had a EGD earlier this year. US GALLBLADDER RUQ   3. Occult blood positive stool mildly positive, will give patient fit test.  Contact PE August 7, 2019 and on Eliquis can not get colonoscopy to after November 7. Morena Amanda MD, Gastroenterology, [facility]    CBC Auto Differential   4. Need for influenza vaccination  INFLUENZA, TRIV, INACTIVATED, SUBUNIT, ADJUVANTED, 65 YRS AND OLDER, IM, PREFILL SYR, 0.5ML (FLUAD TRIV)   5. Left-sided headache migraines for years but has had a headache for 3 weeks and only on the left side and since on blood thinners we will get a CT of the head. No focal neurologic findings on exam today but this is not patient's usual headache pattern and have a headache for 3 weeks unusual. CBC Auto Differential    CT HEAD W CONTRAST   6. Hypercholesterolemia on pravastatin 40 monitor labs. Lipid Panel    Comprehensive Metabolic Panel   7. Vitamin D deficiency on supplement monitor level 6 at goal 50-80 Vitamin D 25 Hydroxy   8. Vitamin B 12 deficiency  Vitamin B12   9.  Other fatigue  TSH WITH REFLEX TO

## 2019-09-17 LAB
ESTIMATED AVERAGE GLUCOSE: 105.4 MG/DL
HBA1C MFR BLD: 5.3 %

## 2019-09-21 RX ORDER — ROSUVASTATIN CALCIUM 10 MG/1
10 TABLET, COATED ORAL DAILY
Qty: 30 TABLET | Refills: 0 | Status: SHIPPED | OUTPATIENT
Start: 2019-09-21 | End: 2019-10-19 | Stop reason: SDUPTHER

## 2019-09-27 ENCOUNTER — NURSE TRIAGE (OUTPATIENT)
Dept: OTHER | Facility: CLINIC | Age: 59
End: 2019-09-27

## 2019-09-27 ENCOUNTER — TELEPHONE (OUTPATIENT)
Dept: PRIMARY CARE CLINIC | Age: 59
End: 2019-09-27

## 2019-09-30 ENCOUNTER — HOSPITAL ENCOUNTER (EMERGENCY)
Age: 59
Discharge: HOME | End: 2019-09-30
Payer: COMMERCIAL

## 2019-09-30 ENCOUNTER — NURSE TRIAGE (OUTPATIENT)
Dept: OTHER | Facility: CLINIC | Age: 59
End: 2019-09-30

## 2019-09-30 VITALS
SYSTOLIC BLOOD PRESSURE: 143 MMHG | TEMPERATURE: 97.1 F | OXYGEN SATURATION: 95 % | HEART RATE: 89 BPM | WEIGHT: 225.75 LBS | BODY MASS INDEX: 30.58 KG/M2 | RESPIRATION RATE: 17 BRPM | HEIGHT: 72 IN | DIASTOLIC BLOOD PRESSURE: 89 MMHG

## 2019-09-30 DIAGNOSIS — R60.9 PERIPHERAL EDEMA: ICD-10-CM

## 2019-09-30 DIAGNOSIS — M79.605 BILATERAL LEG PAIN: Primary | ICD-10-CM

## 2019-09-30 DIAGNOSIS — M79.604 BILATERAL LEG PAIN: Primary | ICD-10-CM

## 2019-09-30 LAB
A/G RATIO: 1.5 (ref 1.1–2.2)
ALBUMIN SERPL-MCNC: 4.2 G/DL (ref 3.4–5)
ALP BLD-CCNC: 107 U/L (ref 40–129)
ALT SERPL-CCNC: 26 U/L (ref 10–40)
ANION GAP SERPL CALCULATED.3IONS-SCNC: 12 MMOL/L (ref 3–16)
AST SERPL-CCNC: 22 U/L (ref 15–37)
BILIRUB SERPL-MCNC: 0.3 MG/DL (ref 0–1)
BUN BLDV-MCNC: 17 MG/DL (ref 7–20)
CALCIUM SERPL-MCNC: 9.1 MG/DL (ref 8.3–10.6)
CHLORIDE BLD-SCNC: 104 MMOL/L (ref 99–110)
CO2: 26 MMOL/L (ref 21–32)
CREAT SERPL-MCNC: 0.7 MG/DL (ref 0.6–1.2)
GFR AFRICAN AMERICAN: >60
GFR NON-AFRICAN AMERICAN: >60
GLOBULIN: 2.8 G/DL
GLUCOSE BLD-MCNC: 96 MG/DL (ref 70–99)
HCT VFR BLD CALC: 36.8 % (ref 36–48)
HEMOGLOBIN: 12.1 G/DL (ref 12–16)
MCH RBC QN AUTO: 30.4 PG (ref 26–34)
MCHC RBC AUTO-ENTMCNC: 33 G/DL (ref 31–36)
MCV RBC AUTO: 92.2 FL (ref 80–100)
PDW BLD-RTO: 16.4 % (ref 12.4–15.4)
PLATELET # BLD: 317 K/UL (ref 135–450)
PMV BLD AUTO: 7.7 FL (ref 5–10.5)
POTASSIUM SERPL-SCNC: 4.3 MMOL/L (ref 3.5–5.1)
RBC # BLD: 3.99 M/UL (ref 4–5.2)
SODIUM BLD-SCNC: 142 MMOL/L (ref 136–145)
TOTAL PROTEIN: 7 G/DL (ref 6.4–8.2)
WBC # BLD: 10 K/UL (ref 4–11)

## 2019-09-30 PROCEDURE — 99284 EMERGENCY DEPT VISIT MOD MDM: CPT

## 2019-09-30 PROCEDURE — 85027 COMPLETE CBC AUTOMATED: CPT

## 2019-09-30 PROCEDURE — 80053 COMPREHEN METABOLIC PANEL: CPT

## 2019-09-30 PROCEDURE — 93970 EXTREMITY STUDY: CPT

## 2019-09-30 RX ORDER — HYDROCODONE BITARTRATE AND ACETAMINOPHEN 5; 325 MG/1; MG/1
1 TABLET ORAL EVERY 6 HOURS PRN
Qty: 8 TABLET | Refills: 0 | Status: SHIPPED | OUTPATIENT
Start: 2019-09-30 | End: 2019-10-08 | Stop reason: SDUPTHER

## 2019-09-30 ASSESSMENT — PAIN DESCRIPTION - PAIN TYPE: TYPE: ACUTE PAIN

## 2019-09-30 ASSESSMENT — PAIN DESCRIPTION - LOCATION: LOCATION: LEG

## 2019-09-30 ASSESSMENT — ENCOUNTER SYMPTOMS
NAUSEA: 0
SHORTNESS OF BREATH: 0
ABDOMINAL PAIN: 0
VOMITING: 0

## 2019-09-30 ASSESSMENT — PAIN DESCRIPTION - DESCRIPTORS: DESCRIPTORS: TENDER

## 2019-09-30 ASSESSMENT — PAIN DESCRIPTION - ORIENTATION: ORIENTATION: LEFT;RIGHT

## 2019-09-30 ASSESSMENT — PAIN SCALES - GENERAL
PAINLEVEL_OUTOF10: 4
PAINLEVEL_OUTOF10: 0

## 2019-10-01 ENCOUNTER — TELEPHONE (OUTPATIENT)
Dept: PRIMARY CARE CLINIC | Age: 59
End: 2019-10-01

## 2019-10-02 ENCOUNTER — OFFICE VISIT (OUTPATIENT)
Dept: PRIMARY CARE CLINIC | Age: 59
End: 2019-10-02
Payer: COMMERCIAL

## 2019-10-02 VITALS
RESPIRATION RATE: 18 BRPM | HEART RATE: 85 BPM | OXYGEN SATURATION: 95 % | TEMPERATURE: 96 F | DIASTOLIC BLOOD PRESSURE: 77 MMHG | SYSTOLIC BLOOD PRESSURE: 116 MMHG | WEIGHT: 222 LBS | BODY MASS INDEX: 29.29 KG/M2

## 2019-10-02 DIAGNOSIS — R60.0 BILATERAL LEG EDEMA: Primary | ICD-10-CM

## 2019-10-02 DIAGNOSIS — R60.0 BILATERAL LEG EDEMA: ICD-10-CM

## 2019-10-02 LAB
PREALBUMIN: 22.6 MG/DL (ref 20–40)
PRO-BNP: 81 PG/ML (ref 0–124)
T3 FREE: 2.9 PG/ML (ref 2.3–4.2)

## 2019-10-02 PROCEDURE — 99213 OFFICE O/P EST LOW 20 MIN: CPT | Performed by: INTERNAL MEDICINE

## 2019-10-02 RX ORDER — POTASSIUM CHLORIDE 20 MEQ/1
20 TABLET, EXTENDED RELEASE ORAL DAILY
Qty: 30 TABLET | Refills: 5 | Status: SHIPPED | OUTPATIENT
Start: 2019-10-02

## 2019-10-02 RX ORDER — FUROSEMIDE 40 MG/1
40 TABLET ORAL DAILY
Qty: 30 TABLET | Refills: 5 | Status: SHIPPED | OUTPATIENT
Start: 2019-10-02 | End: 2020-08-10

## 2019-10-02 ASSESSMENT — ENCOUNTER SYMPTOMS
EYE REDNESS: 0
PHOTOPHOBIA: 0
APNEA: 0
CHEST TIGHTNESS: 0
BACK PAIN: 1
RECTAL PAIN: 0
ABDOMINAL PAIN: 0
RHINORRHEA: 0
SINUS PRESSURE: 0
NAUSEA: 0
CONSTIPATION: 0
SORE THROAT: 0
STRIDOR: 0
ABDOMINAL DISTENTION: 0
ANAL BLEEDING: 0
TROUBLE SWALLOWING: 0
COUGH: 0
CHOKING: 0
VOMITING: 0
BLOOD IN STOOL: 0
VOICE CHANGE: 0
EYE PAIN: 0
EYE ITCHING: 0
EYE DISCHARGE: 0
WHEEZING: 0
DIARRHEA: 0

## 2019-10-05 RX ORDER — ERGOCALCIFEROL 1.25 MG/1
50000 CAPSULE ORAL WEEKLY
Qty: 4 CAPSULE | Refills: 5 | Status: SHIPPED | OUTPATIENT
Start: 2019-10-05 | End: 2020-05-11

## 2019-10-08 DIAGNOSIS — M79.605 BILATERAL LEG PAIN: ICD-10-CM

## 2019-10-08 DIAGNOSIS — M79.604 BILATERAL LEG PAIN: ICD-10-CM

## 2019-10-08 RX ORDER — HYDROCODONE BITARTRATE AND ACETAMINOPHEN 5; 325 MG/1; MG/1
1 TABLET ORAL EVERY 6 HOURS PRN
Qty: 24 TABLET | Refills: 0 | Status: SHIPPED | OUTPATIENT
Start: 2019-10-08 | End: 2019-10-15

## 2019-10-11 ENCOUNTER — OFFICE VISIT (OUTPATIENT)
Dept: PRIMARY CARE CLINIC | Age: 59
End: 2019-10-11
Payer: COMMERCIAL

## 2019-10-11 VITALS
BODY MASS INDEX: 28.89 KG/M2 | SYSTOLIC BLOOD PRESSURE: 117 MMHG | HEART RATE: 82 BPM | WEIGHT: 219 LBS | DIASTOLIC BLOOD PRESSURE: 83 MMHG | TEMPERATURE: 97.8 F | OXYGEN SATURATION: 95 %

## 2019-10-11 DIAGNOSIS — Z79.899 ENCOUNTER FOR MONITORING DIURETIC THERAPY: Primary | ICD-10-CM

## 2019-10-11 DIAGNOSIS — Z51.81 ENCOUNTER FOR MONITORING DIURETIC THERAPY: Primary | ICD-10-CM

## 2019-10-11 DIAGNOSIS — Z51.81 ENCOUNTER FOR MONITORING DIURETIC THERAPY: ICD-10-CM

## 2019-10-11 DIAGNOSIS — R60.0 BILATERAL LEG EDEMA: ICD-10-CM

## 2019-10-11 DIAGNOSIS — Z79.899 ENCOUNTER FOR MONITORING DIURETIC THERAPY: ICD-10-CM

## 2019-10-11 LAB
ALBUMIN SERPL-MCNC: 4.8 G/DL (ref 3.4–5)
ANION GAP SERPL CALCULATED.3IONS-SCNC: 16 MMOL/L (ref 3–16)
BUN BLDV-MCNC: 16 MG/DL (ref 7–20)
CALCIUM SERPL-MCNC: 9.4 MG/DL (ref 8.3–10.6)
CHLORIDE BLD-SCNC: 99 MMOL/L (ref 99–110)
CO2: 26 MMOL/L (ref 21–32)
CREAT SERPL-MCNC: 1 MG/DL (ref 0.6–1.2)
GFR AFRICAN AMERICAN: >60
GFR NON-AFRICAN AMERICAN: 56
GLUCOSE BLD-MCNC: 94 MG/DL (ref 70–99)
PHOSPHORUS: 3.5 MG/DL (ref 2.5–4.9)
POTASSIUM SERPL-SCNC: 4.1 MMOL/L (ref 3.5–5.1)
SODIUM BLD-SCNC: 141 MMOL/L (ref 136–145)

## 2019-10-11 PROCEDURE — 99213 OFFICE O/P EST LOW 20 MIN: CPT | Performed by: INTERNAL MEDICINE

## 2019-10-11 RX ORDER — GABAPENTIN 300 MG/1
1 CAPSULE ORAL
COMMUNITY
End: 2020-07-07

## 2019-10-11 ASSESSMENT — ENCOUNTER SYMPTOMS
ANAL BLEEDING: 0
EYE ITCHING: 0
EYE PAIN: 0
CHOKING: 0
EYE REDNESS: 0
DIARRHEA: 0
BLOOD IN STOOL: 0
CHANGE IN BOWEL HABIT: 0
CHEST TIGHTNESS: 0
SORE THROAT: 0
TROUBLE SWALLOWING: 0
BACK PAIN: 1
PHOTOPHOBIA: 0
CONSTIPATION: 0
SWOLLEN GLANDS: 0
RHINORRHEA: 0
WHEEZING: 0
NAUSEA: 0
VOMITING: 0
COUGH: 0
EYE DISCHARGE: 0
ABDOMINAL DISTENTION: 0
APNEA: 0
RECTAL PAIN: 0
VOICE CHANGE: 0
STRIDOR: 0
ABDOMINAL PAIN: 0
VISUAL CHANGE: 0
SINUS PRESSURE: 0

## 2019-12-02 ENCOUNTER — OFFICE VISIT (OUTPATIENT)
Dept: PRIMARY CARE CLINIC | Age: 59
End: 2019-12-02
Payer: COMMERCIAL

## 2019-12-02 VITALS
OXYGEN SATURATION: 97 % | SYSTOLIC BLOOD PRESSURE: 128 MMHG | HEIGHT: 72 IN | WEIGHT: 211 LBS | HEART RATE: 79 BPM | BODY MASS INDEX: 28.58 KG/M2 | TEMPERATURE: 97 F | DIASTOLIC BLOOD PRESSURE: 88 MMHG

## 2019-12-02 DIAGNOSIS — J21.9 ACUTE BRONCHIOLITIS DUE TO UNSPECIFIED ORGANISM: ICD-10-CM

## 2019-12-02 DIAGNOSIS — R60.0 BILATERAL LEG EDEMA: ICD-10-CM

## 2019-12-02 DIAGNOSIS — M70.61 TROCHANTERIC BURSITIS OF RIGHT HIP: ICD-10-CM

## 2019-12-02 DIAGNOSIS — Z12.31 ENCOUNTER FOR SCREENING MAMMOGRAM FOR BREAST CANCER: Primary | ICD-10-CM

## 2019-12-02 DIAGNOSIS — E04.9 GOITER: ICD-10-CM

## 2019-12-02 DIAGNOSIS — I10 DIASTOLIC HYPERTENSION: ICD-10-CM

## 2019-12-02 PROCEDURE — 99214 OFFICE O/P EST MOD 30 MIN: CPT | Performed by: INTERNAL MEDICINE

## 2019-12-02 ASSESSMENT — ENCOUNTER SYMPTOMS
COUGH: 0
SORE THROAT: 0
PHOTOPHOBIA: 0
BACK PAIN: 1
APNEA: 0
ABDOMINAL DISTENTION: 0
EYE PAIN: 0
RHINORRHEA: 0
BLOOD IN STOOL: 0
EYE REDNESS: 0
EYE ITCHING: 0
VOMITING: 0
STRIDOR: 0
VOICE CHANGE: 0
CONSTIPATION: 0
RECTAL PAIN: 0
WHEEZING: 0
CHOKING: 0
EYE DISCHARGE: 0
ABDOMINAL PAIN: 0
NAUSEA: 0
SINUS PRESSURE: 0
ANAL BLEEDING: 0
CHEST TIGHTNESS: 0
TROUBLE SWALLOWING: 0
DIARRHEA: 0

## 2019-12-02 ASSESSMENT — PATIENT HEALTH QUESTIONNAIRE - PHQ9
1. LITTLE INTEREST OR PLEASURE IN DOING THINGS: 0
2. FEELING DOWN, DEPRESSED OR HOPELESS: 0
SUM OF ALL RESPONSES TO PHQ QUESTIONS 1-9: 0
SUM OF ALL RESPONSES TO PHQ QUESTIONS 1-9: 0
SUM OF ALL RESPONSES TO PHQ9 QUESTIONS 1 & 2: 0

## 2019-12-05 PROBLEM — I10 DIASTOLIC HYPERTENSION: Status: ACTIVE | Noted: 2019-12-05

## 2019-12-05 PROBLEM — Z12.31 ENCOUNTER FOR SCREENING MAMMOGRAM FOR BREAST CANCER: Status: ACTIVE | Noted: 2019-10-11

## 2019-12-05 PROBLEM — E04.9 GOITER: Status: ACTIVE | Noted: 2019-12-05

## 2019-12-05 ASSESSMENT — ENCOUNTER SYMPTOMS
ORTHOPNEA: 0
SHORTNESS OF BREATH: 0
BLURRED VISION: 0

## 2019-12-05 ASSESSMENT — PATIENT HEALTH QUESTIONNAIRE - PHQ9
SUM OF ALL RESPONSES TO PHQ9 QUESTIONS 1 & 2: 0
1. LITTLE INTEREST OR PLEASURE IN DOING THINGS: 0
SUM OF ALL RESPONSES TO PHQ QUESTIONS 1-9: 0
SUM OF ALL RESPONSES TO PHQ QUESTIONS 1-9: 0
2. FEELING DOWN, DEPRESSED OR HOPELESS: 0

## 2020-01-04 PROBLEM — Z12.31 ENCOUNTER FOR SCREENING MAMMOGRAM FOR BREAST CANCER: Status: RESOLVED | Noted: 2019-10-11 | Resolved: 2020-01-04

## 2020-04-08 ENCOUNTER — APPOINTMENT (OUTPATIENT)
Dept: CT IMAGING | Age: 60
End: 2020-04-08
Payer: COMMERCIAL

## 2020-04-08 ENCOUNTER — HOSPITAL ENCOUNTER (EMERGENCY)
Age: 60
Discharge: HOME | End: 2020-04-08
Attending: STUDENT IN AN ORGANIZED HEALTH CARE EDUCATION/TRAINING PROGRAM
Payer: COMMERCIAL

## 2020-04-08 ENCOUNTER — APPOINTMENT (OUTPATIENT)
Dept: GENERAL RADIOLOGY | Age: 60
End: 2020-04-08
Payer: COMMERCIAL

## 2020-04-08 VITALS
OXYGEN SATURATION: 97 % | HEART RATE: 72 BPM | WEIGHT: 217.81 LBS | DIASTOLIC BLOOD PRESSURE: 68 MMHG | RESPIRATION RATE: 20 BRPM | BODY MASS INDEX: 29.5 KG/M2 | TEMPERATURE: 98.4 F | SYSTOLIC BLOOD PRESSURE: 102 MMHG | HEIGHT: 72 IN

## 2020-04-08 LAB
ANION GAP SERPL CALCULATED.3IONS-SCNC: 13 MMOL/L (ref 3–16)
BASOPHILS ABSOLUTE: 0.1 K/UL (ref 0–0.2)
BASOPHILS RELATIVE PERCENT: 0.9 %
BUN BLDV-MCNC: 17 MG/DL (ref 7–20)
CALCIUM SERPL-MCNC: 9.1 MG/DL (ref 8.3–10.6)
CHLORIDE BLD-SCNC: 102 MMOL/L (ref 99–110)
CO2: 24 MMOL/L (ref 21–32)
CREAT SERPL-MCNC: 0.7 MG/DL (ref 0.6–1.2)
EOSINOPHILS ABSOLUTE: 0.2 K/UL (ref 0–0.6)
EOSINOPHILS RELATIVE PERCENT: 2.9 %
GFR AFRICAN AMERICAN: >60
GFR NON-AFRICAN AMERICAN: >60
GLUCOSE BLD-MCNC: 106 MG/DL (ref 70–99)
HCT VFR BLD CALC: 38.4 % (ref 36–48)
HEMOGLOBIN: 12.8 G/DL (ref 12–16)
LYMPHOCYTES ABSOLUTE: 2.1 K/UL (ref 1–5.1)
LYMPHOCYTES RELATIVE PERCENT: 32.3 %
MCH RBC QN AUTO: 30.6 PG (ref 26–34)
MCHC RBC AUTO-ENTMCNC: 33.3 G/DL (ref 31–36)
MCV RBC AUTO: 92 FL (ref 80–100)
MONOCYTES ABSOLUTE: 0.3 K/UL (ref 0–1.3)
MONOCYTES RELATIVE PERCENT: 4.3 %
NEUTROPHILS ABSOLUTE: 3.9 K/UL (ref 1.7–7.7)
NEUTROPHILS RELATIVE PERCENT: 59.6 %
PDW BLD-RTO: 14.7 % (ref 12.4–15.4)
PLATELET # BLD: 264 K/UL (ref 135–450)
PMV BLD AUTO: 8.4 FL (ref 5–10.5)
POTASSIUM REFLEX MAGNESIUM: 4 MMOL/L (ref 3.5–5.1)
PRO-BNP: 69 PG/ML (ref 0–124)
RBC # BLD: 4.18 M/UL (ref 4–5.2)
SODIUM BLD-SCNC: 139 MMOL/L (ref 136–145)
TROPONIN: <0.01 NG/ML
WBC # BLD: 6.6 K/UL (ref 4–11)

## 2020-04-08 PROCEDURE — 83880 ASSAY OF NATRIURETIC PEPTIDE: CPT

## 2020-04-08 PROCEDURE — 6360000004 HC RX CONTRAST MEDICATION: Performed by: PHYSICIAN ASSISTANT

## 2020-04-08 PROCEDURE — 80048 BASIC METABOLIC PNL TOTAL CA: CPT

## 2020-04-08 PROCEDURE — 85025 COMPLETE CBC W/AUTO DIFF WBC: CPT

## 2020-04-08 PROCEDURE — 93005 ELECTROCARDIOGRAM TRACING: CPT | Performed by: STUDENT IN AN ORGANIZED HEALTH CARE EDUCATION/TRAINING PROGRAM

## 2020-04-08 PROCEDURE — 71045 X-RAY EXAM CHEST 1 VIEW: CPT

## 2020-04-08 PROCEDURE — 84484 ASSAY OF TROPONIN QUANT: CPT

## 2020-04-08 PROCEDURE — 99285 EMERGENCY DEPT VISIT HI MDM: CPT

## 2020-04-08 PROCEDURE — 94760 N-INVAS EAR/PLS OXIMETRY 1: CPT

## 2020-04-08 PROCEDURE — 71260 CT THORAX DX C+: CPT

## 2020-04-08 RX ADMIN — IOPAMIDOL 75 ML: 755 INJECTION, SOLUTION INTRAVENOUS at 18:10

## 2020-04-08 ASSESSMENT — ENCOUNTER SYMPTOMS
SHORTNESS OF BREATH: 1
WHEEZING: 0
COUGH: 0
ABDOMINAL PAIN: 0
CHEST TIGHTNESS: 0
VOMITING: 0
TROUBLE SWALLOWING: 0
COLOR CHANGE: 0
RHINORRHEA: 0
SORE THROAT: 0
NAUSEA: 0

## 2020-04-08 ASSESSMENT — HEART SCORE: ECG: 0

## 2020-04-08 NOTE — ED PROVIDER NOTES
[address] [facility]        Pt Name: Simon Rosenberg  MRN: [id number]  [place] 1960  Date of evaluation: 4/8/2020  Provider: MAURO Farmer  PCP: Kevin Palmer MD     I have seen and evaluated this patient with my supervising physician Brooke St MD.    [address] [place] [address]       Chief Complaint   Patient presents with    Shortness of Breath     since Sat., dizziness with activity, + cough, denies fever, hx of PE       HISTORY OF PRESENT ILLNESS   (Location, Timing/Onset, Context/Setting, Quality, Duration, Modifying Factors, Severity, Associated Signs and Symptoms)  Note limiting factors. Simon Rosenberg is a 63 y.o. female presents the emergency department today with complaints of shortness of breath. She states it is exertional, and has been occurring since Saturday. She states that she feels very short of breath and feels somewhat lightheaded and dizzy with activity. She has no symptoms when she is sitting or at rest.  She does report a little bit of a cough, this is prevalent mostly at night and very little during the daytime. She states that she has not had any fever, chills, nausea, vomiting. She states she has absolutely no chest pain. She denies any leg pain or swelling. She does have a history of a pulmonary embolism. She states that about 10 days ago she is decreased her dose of Eliquis 5 mg twice a day to Eliquis 2.5 mg twice a day. She states this change was made by her hematologist, Dr. Khanh Carter. She reports that they did a lot of lab studies, and that none of them showed why she clots. She reports that she does have a history of seasonal allergies and does not know if her symptoms could be related to this. She has no recent sick contacts. She did travel to and from [place], [place] in early March, and came back with a GI bug. She states she does not believe she could have COVID.   She has no further complaints at this time. Nursing Notes were all reviewed and agreed with or any disagreements were addressed in the HPI. REVIEW OF SYSTEMS    (2-9 systems for level 4, 10 or more for level 5)     Review of Systems   Constitutional: Negative for chills and fever. HENT: Negative for congestion, postnasal drip, rhinorrhea, sore throat and trouble swallowing. Respiratory: Positive for shortness of breath (only with exertion, none at rest). Negative for cough, chest tightness and wheezing. Cardiovascular: Negative for chest pain and leg swelling. Gastrointestinal: Negative for abdominal pain, nausea and vomiting. Musculoskeletal: Negative for arthralgias and myalgias. Skin: Negative for color change and rash. Neurological: Negative for dizziness, weakness, light-headedness, numbness and headaches. Positives and Pertinent negatives as per HPI. Except as noted above in the ROS, all other systems were reviewed and negative.        PAST MEDICAL HISTORY     Past Medical History:   Diagnosis Date    Bradycardia     Bronchitis     Cancer ([facility] [address] 2009    BCC Rt leg,squamous cell,LEFT LEG,FACE-BASAL CELL    Classic migraine     Degenerated intervertebral disc     GERD (gastroesophageal reflux disease)     Hx of blood clots     neck,arm after first pacemaker inserted    Hypercholesterolemia     IBS (irritable bowel syndrome)     Nausea & vomiting     Neuropathy     Osteoporosis     Peripheral vascular disease (HCC)     PONV (postoperative nausea and vomiting)     family hx also of post op n/v    Rheumatoid arthritis of the hand     left thumb MCP joint , left great toe MTP joint    Seizure ([address]     as a child due to hole in heart-last one [address] sinus syndrome ([address]     s/p pacemaker 2008    Sleep apnea     NO C-PAP    Thyroid nodule     benign s/p surgery-GOITER-REMOVED         SURGICAL HISTORY     Past Surgical History:   Procedure Laterality Date    CARDIAC SURGERY  01/1965    Congenital heart defect    COLONOSCOPY      SEVERAL    ESOPHAGEAL DILATATION N/A 6/20/2019    ESOPHAGEAL DILATION VALLECILLO performed by Susannah Babinski, MD at [address] [place] [address] ARTHROSCOPY Right     torn meniscous    KNEE SURGERY Left 2010   [address]   [address] [facility] & 2007    Placement under stomach muscle    PACEMAKER PLACEMENT  2016    THYROIDECTOMY, PARTIAL Right 2011    TONSILLECTOMY AND ADENOIDECTOMY  1978    TYMPANOSTOMY TUBE PLACEMENT      UPPER GASTROINTESTINAL ENDOSCOPY  04/17/2017    UPPER GASTROINTESTINAL ENDOSCOPY  07/06/2017    UPPER GASTROINTESTINAL ENDOSCOPY N/A 6/20/2019    EGD BIOPSY performed by Susannah Babinski, MD at [facility]       Previous Medications    APIXABAN (ELIQUIS) 5 MG TABS TABLET    Take 2 tabs po BID for 6 days then 1 tab BID    B COMPLEX VITAMINS CAPSULE    Take 1 capsule by mouth daily    DULOXETINE (CYMBALTA) 60 MG EXTENDED RELEASE CAPSULE    TAKE ONE CAPSULE BY MOUTH DAILY    FUROSEMIDE (LASIX) 40 MG TABLET    Take 1 tablet by mouth daily    GABAPENTIN (NEURONTIN) 300 MG CAPSULE    1 capsule. MUPIROCIN (BACTROBAN) 2 % OINTMENT    daily    OMEPRAZOLE (PRILOSEC) 20 MG DELAYED RELEASE CAPSULE    Take 20 mg by mouth 2 times daily    POTASSIUM CHLORIDE (KLOR-CON M) 20 MEQ EXTENDED RELEASE TABLET    Take 1 tablet by mouth daily    ROSUVASTATIN (CRESTOR) 10 MG TABLET    TAKE ONE TABLET BY MOUTH DAILY    VITAMIN D (ERGOCALCIFEROL) 71537 UNITS CAPS CAPSULE    Take 1 capsule by mouth once a week Vitamin D2         ALLERGIES     Keflex [cephalexin];  Adhesive tape; and Morphine    FAMILYHISTORY       Family History   Problem Relation Age of Onset    Rheum Arthritis Mother     Hypertension Mother     Osteoporosis Mother     Kidney Disease Mother     Cancer Father         lung    Diabetes Father     COPD Father     High Blood Pressure Sister    [facility] Migraines Sister     Osteoporosis Abdomen is soft. Tenderness: There is no abdominal tenderness. Musculoskeletal:      Right lower leg: No edema. Left lower leg: No edema. Skin:     General: Skin is warm and dry. Neurological:      Mental Status: She is alert and oriented to person, place, and time. Psychiatric:         Behavior: Behavior normal. Behavior is cooperative. Thought Content: Thought content normal.         DIAGNOSTIC RESULTS   LABS:    Labs Reviewed   BASIC METABOLIC PANEL W/ REFLEX TO MG FOR LOW K - Abnormal; Notable for the following components:       Result Value    Glucose 106 (*)     All other components within normal limits    Narrative:     Performed at:  [facility]  [address]   Phone [phone]   CBC WITH AUTO DIFFERENTIAL    Narrative:     Performed at:  [facility]  [address] [place], [place] [address]   Phone [phone]   BRAIN NATRIURETIC PEPTIDE    Narrative:     Performed at:  [facility]  [address] [place], [place] [address]   Phone [phone]   TROPONIN    Narrative:     Performed at:  [facility]  [address] [place], [place] [address]   Phone [phone]       All other labs were within normal range or not returned as of this dictation. EKG: All EKG's are interpreted by the Emergency Department Physician in the absence of a cardiologist.  Please see their note for interpretation of EKG. RADIOLOGY:   Non-plain film images such as CT, Ultrasound and MRI are read by the radiologist. Plain radiographic images are visualized and preliminarily interpreted by the ED Provider with the below findings:        Interpretation per the Radiologist below, if available at the time of this note:    CT CHEST PULMONARY EMBOLISM W CONTRAST   Final Result   Limited by motion.   No pulmonary embolus         XR CHEST

## 2020-04-08 NOTE — ED NOTES
Lilian WADE at bedside. RN unable to obtain IV or blood, will have someone else attempt to collect.       Yaa Nascimento RN  04/08/20 5882

## 2020-04-09 ENCOUNTER — CARE COORDINATION (OUTPATIENT)
Dept: CARE COORDINATION | Age: 60
End: 2020-04-09

## 2020-04-09 LAB
EKG ATRIAL RATE: 81 BPM
EKG DIAGNOSIS: NORMAL
EKG Q-T INTERVAL: 136 MS
EKG QRS DURATION: 68 MS
EKG QTC CALCULATION (BAZETT): 221 MS
EKG R AXIS: 18 DEGREES
EKG T AXIS: 183 DEGREES
EKG VENTRICULAR RATE: 159 BPM

## 2020-04-09 PROCEDURE — 93010 ELECTROCARDIOGRAM REPORT: CPT | Performed by: INTERNAL MEDICINE

## 2020-04-09 RX ORDER — BUDESONIDE AND FORMOTEROL FUMARATE DIHYDRATE 160; 4.5 UG/1; UG/1
2 AEROSOL RESPIRATORY (INHALATION) 2 TIMES DAILY
Qty: 1 INHALER | Refills: 3 | Status: SHIPPED | OUTPATIENT
Start: 2020-04-09 | End: 2020-08-14

## 2020-04-14 ENCOUNTER — CARE COORDINATION (OUTPATIENT)
Dept: CARE COORDINATION | Age: 60
End: 2020-04-14

## 2020-05-11 RX ORDER — ERGOCALCIFEROL 1.25 MG/1
CAPSULE ORAL
Qty: 4 CAPSULE | Refills: 4 | Status: SHIPPED | OUTPATIENT
Start: 2020-05-11 | End: 2020-08-08

## 2020-05-22 ENCOUNTER — OFFICE VISIT (OUTPATIENT)
Dept: PRIMARY CARE CLINIC | Age: 60
End: 2020-05-22
Payer: COMMERCIAL

## 2020-05-22 VITALS — OXYGEN SATURATION: 97 % | TEMPERATURE: 98.4 F | HEART RATE: 84 BPM

## 2020-05-22 PROCEDURE — 99213 OFFICE O/P EST LOW 20 MIN: CPT | Performed by: FAMILY MEDICINE

## 2020-05-22 NOTE — PROGRESS NOTES
FLU/COVID-19 CLINIC  2020  Patient ID:  Alison oGmes is a 63 y.o. female  : 1960    HPI/SYMPTOMS: Pt states that she has not been feeling well for about 2 weeks. She has had SOB and has had to use her inhaler. The past 2 days she has new dry cough, mild dyspnea. ALbuterol 2 to 3 times a day helps some. Symptom duration, days:  [] 1   [x] 2   [] 3   [] 4 - 7  [] 8 - 10   [] 11 - 13   [] >14    IF THE BELOW SYMPTOMS ARE NOT CHECKED, THEN THEY ARE NEGATIVE:  [] Fevers  {  [] Symptom (not measured)  [] Measured (Result:  degrees)  [] Chills  [x] Cough   [x] Dry   [] Productive  [] Coughing up blood    [x] Short of breath  [] Rest  [x] Exertion only   [] Chest pain     [] Chest tightness  [] Chest congestion  [] Nasal congestion  [] Sneezing  [] Loss of smell or taste  [x] Sore throat  [] Headaches  [] Tolerable  [] Severe     [] Muscle aches  [] Nausea  [] Vomiting  []Unable to keep fluids down     [] Diarrhea  []Severe     [] OTHER SYMPTOMS:      Symptom course:   [] Worsening     [] Stable     [] Improving    RISK FACTORS (if not checked they are negative) :  [] Pregnant or possibly pregnant  [] Age over 61  [] Diabetes  [] Heart disease  [] Asthma  [] COPD/Other chronic lung diseases  [] Active Cancer  [] On Chemotherapy  [] Taking oral steroids  [] History Lymphoma/Leukemia  [] Close contact with a lab confirmed COVID-19 patient within 14 days of symptom onset  [] History of travel from affected geographical areas within 14 days of symptom onset     []Tobacco history  []Other:      ALLERGIES  Allergies   Allergen Reactions    Keflex [Cephalexin] Nausea And Vomiting     vomiting    Adhesive Tape Other (See Comments)     Red & painful-blisters-severe    Morphine Other (See Comments)     Hyper.  Pt refuse           VITALS:  Vitals:    20 1258   Pulse: 84   Temp: 98.4 °F (36.9 °C)   SpO2: 97%       Physical Exam:  [x]Alert  [x]Oriented to person/place/time   [x]No apparent distress

## 2020-05-26 LAB
SARS-COV-2: NOT DETECTED
SOURCE: NORMAL

## 2020-07-07 ENCOUNTER — OFFICE VISIT (OUTPATIENT)
Dept: ENT CLINIC | Age: 60
End: 2020-07-07
Payer: COMMERCIAL

## 2020-07-07 VITALS
HEIGHT: 72 IN | BODY MASS INDEX: 28.74 KG/M2 | TEMPERATURE: 97.5 F | DIASTOLIC BLOOD PRESSURE: 85 MMHG | HEART RATE: 89 BPM | SYSTOLIC BLOOD PRESSURE: 136 MMHG

## 2020-07-07 PROCEDURE — 99214 OFFICE O/P EST MOD 30 MIN: CPT | Performed by: OTOLARYNGOLOGY

## 2020-07-07 RX ORDER — FLUTICASONE PROPIONATE 50 MCG
1 SPRAY, SUSPENSION (ML) NASAL DAILY
COMMUNITY

## 2020-07-07 RX ORDER — LORATADINE 10 MG/1
CAPSULE, LIQUID FILLED ORAL
COMMUNITY
End: 2020-09-09 | Stop reason: ALTCHOICE

## 2020-07-07 RX ORDER — AMOXICILLIN AND CLAVULANATE POTASSIUM 875; 125 MG/1; MG/1
1 TABLET, FILM COATED ORAL 2 TIMES DAILY
Qty: 28 TABLET | Refills: 0 | Status: SHIPPED | OUTPATIENT
Start: 2020-07-07 | End: 2020-07-21

## 2020-07-07 NOTE — PATIENT INSTRUCTIONS
RHINOSINUSITIS CARE  · Take Probiotic while you are taking antibiotics, to prevent diarrhea, stomach upset, pseudomembranous colitis, and C. difficile diarrhea. This may be obtained at your pharmacy or health food store. Alternatively, you may eat one cup of yogurt with active or live cultures twice daily while taking the antibiotic. Continue for two to three days after completion of the antibiotic. · Use a 12 hour decongestant spray, 0.05% oxymetazoline (e.g. Afrin, Duration, 4-Way). Spray each nostril twice three times a day for three days, then two times a day for 2 days, and then stop for two days and then repeat the cycle once. · Take one Mucinex-D (red orange box) maximum strength tablet each morning and one Mucinex (blue box) maximum strength tablet each evening, about 12 hours later, daily for the next ten days. Take only is approved by your PCP regarding high blood pressure. · A steam inhaler mask device may be helpful. These can be purchased at your pharmacy. · Use a saline nasal/sinus irrigation system, e.g. NeilMed sinus rinse, twice daily to help to clear secretions and drainage. Use distilled water; DO NOT USE TAP WATER! This is because of the possibility of amoeba or other microorganisms in tap water, which can result in a fatal disease. Alternatively, you could use a blue bulb syringe and solution of 1/4 tsp of table salt in 8 ounces (one cup or 240 ml) of distilled water. · Use fluticasone 2 sprays in each nostril once daily. · It may take several days to several weeks for your sinusitis to clear up. It is important to take all your medications as prescribed. Please continue your antibiotics as prescribed.     · Please call the office if your condition worsens or if symptoms persist after treatment is completed.        ===================================================================      ADVERSE AND SIDE EFFECTS OF MEDICATIONS:    Please be aware of the following possible

## 2020-07-07 NOTE — PROGRESS NOTES
[facility]        PCP:  Agatha Kumar MD       CHIEF COMPLAINT:  Chief Complaint   Patient presents with    Otalgia         HISTORY OF PRESENT ILLNESS:   Corinda Claude is a 63 y.o. female  here today for for evaluation and treatment of a problem located in the left ear. The quality is pain. The severity is 7 on a 1-10 scale . The duration is about 3 weeks. The timing is intermittent on and off. The context is had tube in the ear. Left ear was hurting a lot, \"I went to the [address] [place] [address] Dr, and the nurse practitioner said my tube is standing up and putting pressure on my ear canal and there is wax behind it\"  She has tinnitus. \"I feel like I lost some hearing. It's been going on for about week. \"        On about March 8, 2016, the right PE tube fell out, two years ago. The left PE tube is still in. Dr. Waylon Rubinstein, has followed her for chronic ear infections. She has had no ear infection in the right ear since the PE tube was inserted or since the right PE tube came out . No ear infection in left ear until last year when you saw me and drained my ear. Tinnitus \"just started last summer and it sounds like I have locusts in my ear. It's sometimes louder and sometimes not bad, like a light buzzing right now. Tinnitus is in both ears but more in the right ear. REVIEW OF SYSTEMS:  CONSTITUTIONAL:  Denied fever and chills. EARS, NOSE, THROAT:  (+) sore throat two weeks. (+) Hoarseness for one month. (+) Lot of PND.   Denied otorrhea,  nasal dyspnea, rhinorrhea, pain in forehead and cheeks       PAST MEDICAL HISTORY:   Past Medical History:   Diagnosis Date    Bradycardia     Bronchitis     Cancer ([facility] 2009    BCC Rt leg,squamous cell,LEFT LEG,FACE-BASAL CELL    Classic migraine     Degenerated intervertebral disc     GERD (gastroesophageal reflux disease)     Hx of blood clots     neck,arm after first pacemaker inserted    Hypercholesterolemia     IBS (irritable bowel syndrome)     Nausea & vomiting     Neuropathy     Osteoporosis     Peripheral vascular disease ([address]     PONV (postoperative nausea and vomiting)     family hx also of post op n/v    Rheumatoid arthritis of the hand     left thumb MCP joint , left great toe MTP joint    Seizure ([address]     as a child due to hole in heart-last one [address] sinus syndrome ([address]     s/p pacemaker 2008    Sleep apnea     NO C-PAP    Thyroid nodule     benign s/p surgery-GOITER-REMOVED        Past Surgical History:   Procedure Laterality Date    CARDIAC SURGERY  01/1965    Congenital heart defect    COLONOSCOPY      SEVERAL    ESOPHAGEAL DILATATION N/A 6/20/2019    ESOPHAGEAL DILATION VALLECILLO performed by Wilmer Naranjo MD at [address] [place] [address] ARTHROSCOPY Right     torn meniscous    KNEE SURGERY Left 2010   [address] [place] [address] Rd   [address] [facility] & 2007    Placement under stomach muscle    PACEMAKER PLACEMENT  2016    THYROIDECTOMY, PARTIAL Right 2011    TONSILLECTOMY AND ADENOIDECTOMY  1978    TYMPANOSTOMY TUBE PLACEMENT      UPPER GASTROINTESTINAL ENDOSCOPY  04/17/2017    UPPER GASTROINTESTINAL ENDOSCOPY  07/06/2017    UPPER GASTROINTESTINAL ENDOSCOPY N/A 6/20/2019    EGD BIOPSY performed by Wilmer Naranjo MD at [facility]:     Vitals:    07/07/20 1637   BP: 136/85   Pulse: 89   Temp: 97.5 °F (36.4 °C)   Height: 6' 1\" (1.854 m)      VITALS SIGNS: were reviewed. GENERAL APPEARANCE: Well developed, well nourished, no apparent distress, no apparent deformities. ABILITY TO COMMUNICATE/QUALITY OF VOICE:  Communicated without difficulty. Normal voice. INSPECTION, HEAD AND FACE: Normal overall appearance, with no scars, lesions or masses. EYES:  Extraocular motion was intact, bilaterally. Normal primary gaze alignment. Sclera and conjunctiva clear bilaterally.    (+) SINUSES:  The maxillary were tender, bilaterally. SALIVARY GLANDS:  The parotid, submandibular, and sublingual glands were normal bilaterally. EXTERNAL EAR/NOSE:  Normal pinnae and mastoids. Normal external nose. (+) EARS, OTOMICROSCOPY:   There was a PE tube in the EAC out of the TM and lying embedded in cerumen. The right TM and EAC appeared to be normal.   (+) NOSE:  The nasal septum was mildly deviated to the left. The inferior turbinates were normal.  The nasal mucosa and secretions were normal.  No pus or polyps were seen. LIPS, TEETH AND GUMS:  Unremarkable.   (+) OROPHARYNX/ORAL CAVITY:  Post tonsillectomy. Oral mucosa, hard and soft palates, tongue, and pharynx were normal.      NECK:  No masses or tenderness. No abnormal appearance, asymmetry or abnormal tracheal position. Laryngeal cartilages and hyoid bone were normal to palpation, with normal laryngeal crepitus. LYMPH NODES, CERVICAL, FACIAL AND SUPRACLAVICULAR:  No lymphadenopathy.  (+) THYROID:  Left thyroid nodule versus goiter. C/W post right lobectomy. PROCEDURE:  REMOVAL OF FOREIGN BODY FROM LEFT EAR CANAL         Under the operating microscope, the foreign body, PE tube, was visualized. It was out of the left TM and lying in wax. It was removed with an alligator forceps along with surrounding cerumen. There was a large 30% posterior inferior perforation over and exposing the I-S joint and the round window niche. The remainder of the left TM was nonerythematous, with no ABEBA or exudate. IMPRESSION / Yoly Peasant / Vargas.Revering / PROCEDURES:   Lonnie Mcgarry was seen today for otalgia. Diagnoses and all orders for this visit:    Otalgia of left ear    Perforation of left tympanic membrane    Acute rhinosinusitis  -     amoxicillin-clavulanate (AUGMENTIN) 875-125 MG per tablet;  Take 1 tablet by mouth 2 times daily for 14 days    Thyroid nodule  -     US THYROID    Non-penetrating foreign body in left ear canal, initial encounter RECOMMENDATIONS / PLAN:    1. [address] [facility] [address], [facility], or [facility] or with Dr. Christopher Rivera in [facility]. 2. Thyroid ultrasound. 3. See patient instructions on file for this visit, which were fully discussed with the patient. 4. Return in about 1 month (around 8/7/2020) for recheck/follow-up, and sooner if condition worsens.

## 2020-07-16 ENCOUNTER — HOSPITAL ENCOUNTER (OUTPATIENT)
Dept: ULTRASOUND IMAGING | Age: 60
Discharge: HOME | End: 2020-07-16
Payer: COMMERCIAL

## 2020-07-16 PROCEDURE — 76536 US EXAM OF HEAD AND NECK: CPT

## 2020-07-23 PROBLEM — H72.92 PERFORATION OF LEFT TYMPANIC MEMBRANE: Status: ACTIVE | Noted: 2020-07-23

## 2020-07-23 PROBLEM — H92.02 OTALGIA OF LEFT EAR: Status: ACTIVE | Noted: 2020-07-23

## 2020-07-31 ENCOUNTER — TELEPHONE (OUTPATIENT)
Dept: ENT CLINIC | Age: 60
End: 2020-07-31

## 2020-07-31 PROBLEM — J30.2 SEASONAL ALLERGIC RHINITIS: Status: ACTIVE | Noted: 2020-07-31

## 2020-07-31 PROBLEM — Z86.711 HISTORY OF PULMONARY EMBOLISM: Status: ACTIVE | Noted: 2019-09-09

## 2020-07-31 PROBLEM — R01.1 HEART MURMUR: Status: ACTIVE | Noted: 2020-07-31

## 2020-07-31 PROBLEM — Z95.0 PRESENCE OF CARDIAC PACEMAKER: Status: ACTIVE | Noted: 2020-07-31

## 2020-07-31 PROBLEM — K58.9 IRRITABLE BOWEL SYNDROME: Status: ACTIVE | Noted: 2020-07-31

## 2020-07-31 PROBLEM — Z79.01 CHRONIC ANTICOAGULATION: Status: ACTIVE | Noted: 2019-09-09

## 2020-07-31 RX ORDER — DOXYCYCLINE HYCLATE 100 MG
100 TABLET ORAL 2 TIMES DAILY
Qty: 14 TABLET | Refills: 0 | Status: SHIPPED | OUTPATIENT
Start: 2020-07-31 | End: 2020-08-07

## 2020-07-31 NOTE — TELEPHONE ENCOUNTER
RECOMMENDATIONS / PLAN:    1. [address] [facility] [place], or [facility] or with Dr. Alyssa Reaves, in [facility]. 2. Thyroid ultrasound. 3. See patient instructions on file for this visit, which were fully discussed with the patient. 4. Return in about 1 month (around 8/7/2020) for recheck/follow-up, and sooner if condition worsens.

## 2020-07-31 NOTE — TELEPHONE ENCOUNTER
Patient is calling to let us know that her sinus infection has never gone away since here last 7/7/2020. Patient did take all of the abx.  Please advise

## 2020-08-08 RX ORDER — ERGOCALCIFEROL 1.25 MG/1
CAPSULE ORAL
Qty: 12 CAPSULE | Refills: 3 | Status: SHIPPED | OUTPATIENT
Start: 2020-08-08 | End: 2021-07-13 | Stop reason: SDUPTHER

## 2020-08-10 ENCOUNTER — OFFICE VISIT (OUTPATIENT)
Dept: ENT CLINIC | Age: 60
End: 2020-08-10
Payer: COMMERCIAL

## 2020-08-10 VITALS
HEIGHT: 72 IN | BODY MASS INDEX: 29.39 KG/M2 | SYSTOLIC BLOOD PRESSURE: 124 MMHG | DIASTOLIC BLOOD PRESSURE: 86 MMHG | HEART RATE: 86 BPM | WEIGHT: 217 LBS | TEMPERATURE: 96.6 F

## 2020-08-10 PROBLEM — H72.02 CENTRAL PERFORATION OF TYMPANIC MEMBRANE OF LEFT EAR: Status: ACTIVE | Noted: 2020-08-10

## 2020-08-10 PROBLEM — H91.93 BILATERAL HEARING LOSS: Status: ACTIVE | Noted: 2020-08-10

## 2020-08-10 PROBLEM — H72.2X1 MARGINAL PERFORATION OF TYMPANIC MEMBRANE OF RIGHT EAR: Status: ACTIVE | Noted: 2020-08-10

## 2020-08-10 PROCEDURE — 99214 OFFICE O/P EST MOD 30 MIN: CPT | Performed by: OTOLARYNGOLOGY

## 2020-08-10 NOTE — PROGRESS NOTES
[facility]         PCP:  Orion Joaquin MD      CHIEF COMPLAINT:  Chief Complaint   Patient presents with    Otalgia       HISTORY OF PRESENT ILLNESS:   Mercedes Lim is a 63 y.o. female here for recheck and follow-up of a problem located in left ear. The quality is pain. The severity is mild to moderate. The duration is about 7 to 8 weeks. The timing is intermittent, off and on. She did not proceed with the hearing testing yet. Since the last visit here, she had another treatment for acute rhinosinusitis. Currently those symptoms are resolved. After finishing the Augmentin, she still did not feel better and she called Dr. Gabriela Downing who called in tetracycine. \"I finished that on Saturday, the 8th, and I feel fine now. \"       REVIEW OF SYSTEMS:   CONSTITUTIONAL:  Denied fever. Denied chills. EARS, NOSE, THROAT: (+) tinnitus, locusts/cicadas both ears, off and on. Denied otorrhea, rhinorrhea, sore throat and hoarseness.         PAST MEDICAL HISTORY:  Past Medical History:   Diagnosis Date    Bradycardia     Bronchitis     Cancer ([address] 2009    BCC Rt leg,squamous cell,LEFT LEG,FACE-BASAL CELL    Classic migraine     Degenerated intervertebral disc     GERD (gastroesophageal reflux disease)     Hx of blood clots     neck,arm after first pacemaker inserted    Hypercholesterolemia     IBS (irritable bowel syndrome)     Nausea & vomiting     Neuropathy     Osteoporosis     Peripheral vascular disease (HCC)     PONV (postoperative nausea and vomiting)     family hx also of post op n/v    Rheumatoid arthritis of the hand     left thumb MCP joint , left great toe MTP joint    Seizure ([address]     as a child due to hole in heart-last one [address] sinus syndrome ([address]     s/p pacemaker 2008    Sleep apnea     NO C-PAP    Thyroid nodule     benign s/p surgery-GOITER-REMOVED        Past Surgical History:   Procedure Laterality Date    CARDIAC SURGERY  01/1965    Congenital heart defect    COLONOSCOPY      SEVERAL    ESOPHAGEAL DILATATION N/A 6/20/2019    ESOPHAGEAL DILATION VALLECILLO performed by Aleksandra Gay MD at [address] [place] [address] ARTHROSCOPY Right     torn meniscous    KNEE SURGERY Left 2010   [address] [facility]   [address] [facility] & 2007    Placement under stomach muscle    PACEMAKER PLACEMENT  2016    THYROIDECTOMY, PARTIAL Right 2011    TONSILLECTOMY AND ADENOIDECTOMY  1978    TYMPANOSTOMY TUBE PLACEMENT      UPPER GASTROINTESTINAL ENDOSCOPY  04/17/2017    UPPER GASTROINTESTINAL ENDOSCOPY  07/06/2017    UPPER GASTROINTESTINAL ENDOSCOPY N/A 6/20/2019    EGD BIOPSY performed by Aleksandra Gay MD at [facility]:      Vitals:    08/10/20 0917   BP: 124/86   Pulse: 86   Temp: 96.6 °F (35.9 °C)   Weight: 217 lb (98.4 kg)   Height: 6' 1\" (1.854 m)      VITALS SIGNS were reviewed. GENERAL APPEARANCE:  Well developed, well nourished, no apparent distress, no apparent deformities. INSPECTION, HEAD AND FACE:  Normal overall appearance, with no scars, lesions or masses. ABILITY TO COMMUNICATE/QUALITY OF VOICE:  Communicated without difficulty. Normal voice. No hot potato voice. No hoarseness. FACIAL STRENGTH, MOTION:  Normal and equal for all five branches bilaterally. SALIVARY GLANDS:  The parotid, submandibular, and sublingual glands were normal bilaterally. EXTERNAL EAR/NOSE:  Normal pinnae and mastoids. Normal external nose. EARS, OTOMICROSCOPY: The external auditory canals were clear bilaterally. a small cerumen accumulation was removed from the right ear canal with a cerumen curette. There was a posterior inferior marginal perforation in the right tympanic membrane at about 7-8 o'clock. There was no evidence of cholesteatoma or purulent exudate. Middle ear mucosa appeared normal as seen through the perforation.   The left tympanic membrane was diffusely thickened and dull with patches of sclerosis and a central posterior-superior perforation from about 2 o'clock to 5 o'clock. The lower incus, incudostapedial joint, and round window were all visible through the perforation. The middle ear mucosa appeared to be normal with no erythema and no purulent middle ear effusion. There was no evidence of cholesteatoma. NOSE:  The nasal septum was mildely deviation to tlef taith left niferior spur  The inferior turbinates were normal.  The nasal mucosa and secretions were normal.  No pus or polyps were seen. LIPS, TEETH, AND GUMS:   Normal.     OROPHARYNX/ORAL CAVITY:  Post tonsillect Oral mucosa, hard and soft palates, tongue, and pharynx were normal.      NECK:  No masses or tenderness. The laryngeal cartilages and hyoid bone were normal, with normal laryngeal crepitus. No abnormal appearance, asymmetry or abnormal tracheal position. PALPATION OF LYMPH NODES, CERVICAL, FACIAL AND SUPRACLAVICULAR:  No lymphadenopathy. MOOD AND AFFECT: Normal.  No evidence of depression, anxiety or agitation. Narrative    EXAMINATION:    THYROID ULTRASOUND         7/16/2020      FINDINGS:    Right thyroid lobe:  Surgically absent         Left thyroid lobe:  4.8 x 2.5 x 1.5 cm         Isthmus:  1.1 mm         Thyroid Gland:  Thyroid gland demonstrates heterogeneous echotexture and    normal vascularity.         Nodules: Few thyroid nodules present.         NODULE: Left 1         Size: 10.5 x 5.0 x 10.0 mm         Location: Anterior mid lobe         1. Composition:  Cystic (0)         2. Echogenicity:  Hypoechoic (2)         3.  Shape:  Wider-than-tall (0)         4. Margins:  Smooth (0)         5. Echogenic foci:  Large comet-tail artifacts (0)         ACR TI-RADS total points:  2         ACR TI-RADS risk category: TR2         Prior biopsy: No.         Significant growth:  No.         Change in features:  No.         Change in ACR TI-RADS risk category:  No.         NODULE: Left 2         Size: 8.6 x 5.7 by 4.3 mm         Location: Lateral mid lobe         1. Composition:  Cystic (0)         2. Echogenicity:  Hypoechoic (2)         3. Shape:  Wider-than-tall (0)         4. Margins:  Smooth (0)         5. Echogenic foci:  Large comet-tail artifacts (0)         ACR TI-RADS total points:  2         ACR TI-RADS risk category: TR2         Prior biopsy: No.         Significant growth:  No.         Change in features:  No.         Change in ACR TI-RADS risk category:  No.         Other small cystic lesions present in the mid and lower lobe.  Nodule    previously present in the isthmus has resolved.         Cervical lymphadenopathy: No abnormal lymph nodes in the imaged portions of    the neck.              Impression    Normal sized remaining left lobe of the thyroid with several stable benign    cystic nodules.  No significant change since 10/30/2017.         RECOMMENDATIONS:    No follow-up of the nodules necessary.                 IMPRESSION / DIAGNOSES / ORDERS:       Gloria Triana was seen today for otalgia. Diagnoses and all orders for this visit:    Otalgia of left ear    Central perforation of tympanic membrane of left ear    Marginal perforation of tympanic membrane of right ear    Bilateral hearing loss, unspecified hearing loss type    Acute rhinosinusitis  Comments:  Resolved    In the left lobe  Comments:  Several totally cystic thyroid nodules were seen on ultrasound in the left lobe; no further follow-up is indicated. RECOMMENDATIONS / PLAN:    1. Proceed with audiogram.  2. Will consider tympanoplasty surgery.   3. Return for recheck/follow-up about one week after hearing test.

## 2020-08-24 ENCOUNTER — OFFICE VISIT (OUTPATIENT)
Dept: ENT CLINIC | Age: 60
End: 2020-08-24
Payer: COMMERCIAL

## 2020-08-24 VITALS
HEIGHT: 72 IN | SYSTOLIC BLOOD PRESSURE: 138 MMHG | DIASTOLIC BLOOD PRESSURE: 87 MMHG | HEART RATE: 90 BPM | WEIGHT: 217 LBS | BODY MASS INDEX: 29.39 KG/M2 | TEMPERATURE: 97.8 F

## 2020-08-24 PROCEDURE — 99214 OFFICE O/P EST MOD 30 MIN: CPT | Performed by: OTOLARYNGOLOGY

## 2020-08-24 NOTE — PROGRESS NOTES
[facility]         PCP:  Wm Rice MD      CHIEF COMPLAINT:  Chief Complaint   Patient presents with    Otalgia       HISTORY OF PRESENT ILLNESS:   Keesha Camara is a 63 y.o. female here for recheck and follow-up of a problem located in the ear. Quality is central perforation of the tympanic membrane. Associated symptoms is otalgia, hearing loss, and tinnitus, \"like cicadas. \" The loudness is varied. Since the last visit here, she has \"still having pain in my left ear, constantly, usually about 6-7 on the 10 scale, but sometimes it's worse at a 10. Is experiencing \"some pain in my right ear but not as bad and not continuous but every day. \"  \"Both ears are hurting right now. \"  \"My ears always feel like they are full but never have anything coming out of them. \"        REVIEW OF SYSTEMS:   CONSTITUTIONAL:  Denied fever. Denied chills. EARS, NOSE, THROAT:  Denied otorrhea, nasal dyspnea, rhinorrhea, sore throat and hoarseness. EXAMINATION:      Vitals:    08/24/20 0937   BP: 138/87   Pulse: 90   Temp: 97.8 °F (36.6 °C)   Weight: 217 lb (98.4 kg)   Height: 6' 1\" (1.854 m)      VITALS SIGNS were reviewed. GENERAL APPEARANCE:  Well developed, well nourished, no apparent distress, no apparent deformities. ABILITY TO COMMUNICATE/QUALITY OF VOICE:  Communicated without difficulty. Normal voice. No hot potato voice. No hoarseness. EYES:  Extraocular motion was intact, bilaterally. Normal primary gaze alignment. Sclera and conjunctiva clear bilaterally. SALIVARY GLANDS:  The parotid, submandibular, and sublingual glands were normal bilaterally. EXTERNAL EAR/NOSE:  Normal pinnae and mastoids. Normal external nose. EARS, OTOSCOPY: See otomicroscopy exam below. NOSE:  The nasal septum was midline. The inferior turbinates were normal.  The nasal mucosa and secretions were normal.  No pus or polyps were seen. SINUSES:  The maxillary and frontal sinuses were nontender, bilaterally. LIPS, TEETH, AND GUMS:   Normal.  OROPHARYNX/ORAL CAVITY:  Oral mucosa, hard and soft palates, tongue, and pharynx were normal.      NECK:  No masses or tenderness. The laryngeal cartilages and hyoid bone were normal, with normal laryngeal crepitus. No abnormal appearance, asymmetry or abnormal tracheal position. PALPATION OF LYMPH NODES, CERVICAL, FACIAL AND SUPRACLAVICULAR:  No lymphadenopathy. OTOMICROSCOPY: There was a posterior inferior marginal perforation of the right tympanic membrane at about 7-8 o'clock. The right tympanic membrane was dull and thickened. The left tympanic membrane was thickened with a central perforation in the posterior superior quadrant. The middle ear mucosa appeared normal.  There is no middle ear effusion or purulent exudate. The incus incudostapedial joint and round window were visible through the perforation and appeared to be intact. AUDIOGRAM (8/20/2020): Bilateral low-frequency mild conductive hearing loss and bilateral mild to moderate high-frequency sensorineural hearing loss and presbycusis pattern. COUNSELING:    Audiogram results were reviewed and discussed with David Liu. I advised of type of hearing loss and the probable etiology of frequency hearing loss of aging (presbycusis) low-frequency conductive hearing loss probably secondary to tympanic membrane perforations. I discussed the possible associated impairment and disability. I advised of the need for noise protection and avoidance of exposure to excessive noise. I discussed the possible improvement in hearing from tympanoplasty surgery. I discussed the possible benefits of hearing aids, or other amplification therapy. I discussed the possible etiology of tinnitus and treatment/coping measures including masking techniques. I advised of the need for annual and prn hearing tests.   Patient was advised of the possible greater decrease in word recognition ability in the presence of background noise and of the expected difficulty understanding speech in the presence of moderate to high volume background noise associated with this hearing loss. COUNSELING FOR TYMPANOPLASTY:  Raj Lea was advised of the recommended surgery/procedure of tympanoplasty with possible ossicular reconstruction. Raj Lea stated that he wants to proceed with surgery. Raj Lea chose general anesthesia over local anesthesia with IV sedation. Raj Lea stated understanding that this surgery will be done under general anesthesia. The surgical procedure was described. I discussed the incisional scar and possible resultant deformity. I discussed the surgical technique and the usual post operative course. I discussed the possibility of persistent or recurrent tympanic membrane perforation and/or hearing loss. I advised there is no guarantee of cure, success, or of final results. I discussed the alternatives of therapy, expected outcome and potential benefits, and the attendant risks and potential complications, including, but not limited to, failure of the eardrum to heal in the perforation to close with resultant permanent perforation, persistent hearing loss, cholesteatoma, excessive intraoperative or postoperative bleeding, hemorrhage, infection, facial paralysis, injury to surrounding structures or organs, injury to major blood vessels or nerves, excessive scarring, deformity, poor (incomplete or lack of) wound healing, pain, discomfort, loss of taste on the anterior two thirds of tongue, persistent hearing loss, total loss of hearing, worsening of hearing, numbness of ear and surrounding skin, need for further surgery, and risks of anesthesia, including heart attack, cardiac arrest, stroke, blood clots in lower extremity veins, pulmonary embolism, and death, and other risks listed on the informed consent document, which we discussed together. The patient was counseled at length about the risks of zak Covid-19 during the perioperative period and any recovery window from the procedure. The patient was made aware that zak Covid-19  may worsen the prognosis for recovering from the procedure and lend to a higher morbidity and/or mortality risk. All material risks, benefits, and reasonable alternatives including postponing the procedure were discussed. The patient does wish to proceed with the procedure at this time. All Haley's questions were answered. Keesha Camara then stated and confirmed understanding and acceptance of the preceding, the lack of further questions, and the desire to proceed with surgery. Then, Keesha Camara read and signed the informed consent document. IMPRESSION / Christina Viera / Maude Tapia:       Keesha Camara was seen today for otalgia. 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 Left tympanoplasty, type I, with temporalis fascia underlay graft, and possible ossicular reconstruction. 2. Return for post op check. TIME:  I spent a total of 25 minutes with this patient; counseling time = 16 minutes. More than 50% of the visit was spent counseling, coordination of care, and/or reviewing the medical record and radiology reports.

## 2020-08-27 RX ORDER — DULOXETIN HYDROCHLORIDE 60 MG/1
CAPSULE, DELAYED RELEASE ORAL
Qty: 30 CAPSULE | Refills: 4 | Status: SHIPPED | OUTPATIENT
Start: 2020-08-27 | End: 2020-08-31 | Stop reason: SDUPTHER

## 2020-08-31 ENCOUNTER — OFFICE VISIT (OUTPATIENT)
Dept: PRIMARY CARE CLINIC | Age: 60
End: 2020-08-31
Payer: COMMERCIAL

## 2020-08-31 VITALS
BODY MASS INDEX: 29.8 KG/M2 | SYSTOLIC BLOOD PRESSURE: 130 MMHG | HEIGHT: 72 IN | WEIGHT: 220 LBS | OXYGEN SATURATION: 97 % | DIASTOLIC BLOOD PRESSURE: 88 MMHG | RESPIRATION RATE: 16 BRPM | TEMPERATURE: 96.8 F | HEART RATE: 84 BPM

## 2020-08-31 PROBLEM — I73.00 RAYNAUD'S DISEASE: Status: ACTIVE | Noted: 2017-03-13

## 2020-08-31 PROCEDURE — 99242 OFF/OP CONSLTJ NEW/EST SF 20: CPT | Performed by: INTERNAL MEDICINE

## 2020-08-31 RX ORDER — DULOXETIN HYDROCHLORIDE 60 MG/1
CAPSULE, DELAYED RELEASE ORAL
Qty: 90 CAPSULE | Refills: 3 | Status: SHIPPED | OUTPATIENT
Start: 2020-08-31 | End: 2021-12-09 | Stop reason: SDUPTHER

## 2020-08-31 RX ORDER — HYDROCHLOROTHIAZIDE 25 MG/1
25 TABLET ORAL EVERY MORNING
Qty: 90 TABLET | Refills: 1 | Status: SHIPPED | OUTPATIENT
Start: 2020-08-31 | End: 2021-07-29 | Stop reason: SDUPTHER

## 2020-08-31 ASSESSMENT — ENCOUNTER SYMPTOMS
ABDOMINAL DISTENTION: 0
STRIDOR: 0
BACK PAIN: 1
VOMITING: 0
VOICE CHANGE: 0
RHINORRHEA: 0
SORE THROAT: 0
NAUSEA: 0
TROUBLE SWALLOWING: 0
BLOOD IN STOOL: 0
EYE DISCHARGE: 0
ABDOMINAL PAIN: 0
COUGH: 0
EYE PAIN: 0
SHORTNESS OF BREATH: 0
WHEEZING: 0
SINUS PRESSURE: 0
PHOTOPHOBIA: 0
DIARRHEA: 0
EYE REDNESS: 0
CHOKING: 0
ANAL BLEEDING: 0
APNEA: 0
RECTAL PAIN: 0
EYE ITCHING: 0
CHEST TIGHTNESS: 0
CONSTIPATION: 0

## 2020-08-31 ASSESSMENT — PATIENT HEALTH QUESTIONNAIRE - PHQ9
2. FEELING DOWN, DEPRESSED OR HOPELESS: 0
SUM OF ALL RESPONSES TO PHQ QUESTIONS 1-9: 0
SUM OF ALL RESPONSES TO PHQ QUESTIONS 1-9: 0
1. LITTLE INTEREST OR PLEASURE IN DOING THINGS: 0
SUM OF ALL RESPONSES TO PHQ9 QUESTIONS 1 & 2: 0
1. LITTLE INTEREST OR PLEASURE IN DOING THINGS: 0
SUM OF ALL RESPONSES TO PHQ QUESTIONS 1-9: 0
SUM OF ALL RESPONSES TO PHQ QUESTIONS 1-9: 0
2. FEELING DOWN, DEPRESSED OR HOPELESS: 0
SUM OF ALL RESPONSES TO PHQ9 QUESTIONS 1 & 2: 0

## 2020-08-31 NOTE — PROGRESS NOTES
2020     Jenniffer Xavier (:  1960) is a 63 y.o. female, here for evaluation of the following medical concerns:    HPI   Patient presents for pre operative exam for tympanoplasty of the right ear scheduled with Dr. Mary Kay Genao on 2020 at [facility].    Also:  Granulation tissue in the crease of right groin, verses a warty growth. Will have dermatologist evaluate. New onset polydipsia and polyuria.        Patient Active Problem List   Diagnosis    Herpes zoster    Neuropathy    Sick sinus syndrome (HCC)    Thyroid nodule    Classic migraine    Degenerated intervertebral disc    GERD (gastroesophageal reflux disease)    Rheumatoid arthritis of hand (HCC)    Scoliosis    Chondromalacia of patella    Primary localized osteoarthrosis, lower leg    Trochanteric bursitis of right hip    Left hip pain    Knee pain    Enchondroma of femur    Right ankle sprain    Fibula fracture    Family history of type 2 diabetes mellitus in father    Skin cancer    Splenomegaly    Gastric ulcer    Primary osteoarthritis of both knees    Trochanteric bursitis, left hip    Cough variant asthma    Multiple fractures of ribs, bilateral, subsequent encounter for fracture with delayed healing    Greater trochanteric bursitis of left hip    Right rotator cuff tendonitis    Rotator cuff tendonitis, left    Postmenopausal osteoporosis    Multiple thyroid nodules    S/P partial thyroidectomy    History of bisphosphonate therapy    Mixed hyperlipidemia    Acute suppr otitis media w/o spon rupt ear drum, right ear    Acute swimmer's ear of left side    Recurrent acute suppurative otitis media without spontaneous rupture of tympanic membrane of both sides    Acute rhinosinusitis    Allergic arthritis of left hip    Myofascial pain    Acute saddle pulmonary embolism without acute cor pulmonale (HCC)    Pulmonary embolism affecting pregnancy in first trimester    Acute saddle discharge, ear pain, hearing loss, postnasal drip, rhinorrhea, sinus pressure, sore throat, tinnitus, trouble swallowing and voice change. Hoarseness resolved    Goiter stable per check 2017. Eyes: Negative for photophobia, pain, discharge, redness, itching and visual disturbance. Wears glasses. Tested two years ago. No glaucoma. Some floaters, but sees well with glasses. Respiratory: Negative for apnea, cough, choking, chest tightness, shortness of breath, wheezing and stridor. MAGGIE on bi pap and followed by [facility] for one year. Able to walk up a flight a stairs. Asthma stable on Symbicort. .   Cardiovascular: Negative for chest pain, palpitations and leg swelling. Pacemaker 1998 and replaced 2007, and 16.  [facility] manages by Dr. Aliya Gomez. Rib fracture pain is slowly resolving. Gastrointestinal: Negative for abdominal distention, abdominal pain, anal bleeding, blood in stool, constipation, diarrhea, nausea, rectal pain and vomiting. Ventra hernia ruq and pain with lifting. Patient has pain with lifting. GERD not relieved on protonix bid. NO dysphagia. Endocrine:        Prediabetes history. Genitourinary: Negative for decreased urine volume, dysuria, genital sores, hematuria, menstrual problem, pelvic pain, urgency, vaginal bleeding and vaginal discharge. Menarche at age 12. Age of first child age 29      No breast feeding. Paternal aunt with breast cancer at age 27. Maternal aunt and maternal cousin with ovarian cancer. Saw GYN,  Dr. Cynthia Dash in 2016. Mammogram in 2016. Menopause at age 36. History of uterine dysplasia and had endometrial biopsy. Musculoskeletal: Positive for back pain. Negative for arthralgias, joint swelling, myalgias and neck pain. Sciatiac to left thigh and knee    Skin: Negative. Negative for rash.         Status post removal of fungal great nail on 9/6/17 and is healing without problems and is completing a course of lamisil. Allergic/Immunologic: Positive for environmental allergies. Negative for food allergies and immunocompromised state. Neurological: Negative for dizziness, tremors, seizures, syncope, weakness, numbness and headaches. Migraines since age 14. Starts with aura and dizziness. It switches sides, but more frequently on the left. Headaches are more frequent and more severe. One to two per week. Hematological: Negative for adenopathy. Bruises/bleeds easily. 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 Coumadin Oct 1998 to  January of 2008. Pulmonary embolus 2019 on lifetime anticoagulation, unprovoked. Psychiatric/Behavioral: Negative. Prior to Visit Medications    Medication Sig Taking?  Authorizing Provider   DULoxetine (CYMBALTA) 60 MG extended release capsule TAKE ONE CAPSULE BY MOUTH DAILY Yes Lise Sol MD   budesonide-formoterol (SYMBICORT) 160-4.5 MCG/ACT AERO INHALE TWO PUFFS BY MOUTH TWICE A DAY Yes Lise Sol MD   vitamin D (ERGOCALCIFEROL) 1.25 MG (29872 UT) CAPS capsule TAKE ONE CAPSULE BY MOUTH ONCE WEEKLY Yes Lise Sol MD   fluticasone (FLONASE) 50 MCG/ACT nasal spray 1 spray by Each Nostril route daily Yes Historical Provider, MD   albuterol sulfate (PROAIR RESPICLICK) 218 (90 Base) MCG/ACT aerosol powder inhalation Inhale 2 puffs into the lungs every 6 hours as needed for Wheezing or Shortness of Breath Yes Lise Sol MD   rosuvastatin (CRESTOR) 10 MG tablet TAKE ONE TABLET BY MOUTH DAILY Yes Lise Sol MD   potassium chloride (KLOR-CON M) 20 MEQ extended release tablet Take 1 tablet by mouth daily Yes Lise Sol MD   apixaban (ELIQUIS) 5 MG TABS tablet Take 2 tabs po BID for 6 days then 1 tab BID  Patient taking differently: 2.5 mg 2 times daily Take 2 tabs po BID for Tenderness: There is no abdominal tenderness. There is no guarding or rebound. Comments: ruq tenderness. Musculoskeletal: Normal range of motion. General: No tenderness. Comments: Two plus edema of the legs to thighs. Good dp pulses. Lymphadenopathy:      Cervical: No cervical adenopathy. Skin:     General: Skin is warm and dry. Coloration: Skin is not pale. Findings: No erythema or rash. Comments: Elevated granular cylindrical growth in the crease of the right groin. Present for over three months. No drainage or pain. Neurological:      Mental Status: She is alert and oriented to person, place, and time. Cranial Nerves: No cranial nerve deficit. Sensory: No sensory deficit. Motor: No abnormal muscle tone. Coordination: Coordination normal.      Deep Tendon Reflexes: Reflexes are normal and symmetric. Reflexes normal.   Psychiatric:         Behavior: Behavior normal.         Thought Content: Thought content normal.         Judgment: Judgment normal.         ASSESSMENT/PLAN:   Diagnosis Orders   1. Pre-op examination stable for planned procedure pending labs.  ekg from April reviewed. Hold eliquis 3 days prior to procedure. 2. Skin mass in groin, 1 cm raised granular in are of angiogram incision. Will have dermatology evaluate to rule out wart. External Referral To Dermatology    CBC Auto Differential   3. Prediabetes , give diet and monitor labs. Hemoglobin A1C    Microalbumin / Creatinine Urine Ratio   4. Polyuria  Culture, Urine    Comprehensive Metabolic Panel    Urinalysis   5. Bilateral leg edema with mild elevation of bp, start hctz 25 mg qd and kcl supplement. 6. Neuropathic pain , controlled with cymbalta. DULoxetine (CYMBALTA) 60 MG extended release capsule   7. Essential hypertension start hctz goal 783/58, at systolic goal, want diastolic 80 or less.  hydroCHLOROthiazide (HYDRODIURIL) 25 MG tablet       An electronic signature was used to authenticate this note.     --Sanjuanita French MD on 8/31/2020 at 11:12 AM

## 2020-08-31 NOTE — PATIENT INSTRUCTIONS
Patient Education        Learning About Diabetes Food Guidelines  Your Care Instructions     Meal planning is important to manage diabetes. It helps keep your blood sugar at a target level (which you set with your doctor). You don't have to eat special foods. You can eat what your family eats, including sweets once in a while. But you do have to pay attention to how often you eat and how much you eat of certain foods. You may want to work with a dietitian or a certified diabetes educator (CDE) to help you plan meals and snacks. A dietitian or CDE can also help you lose weight if that is one of your goals. What should you know about eating carbs? Managing the amount of carbohydrate (carbs) you eat is an important part of healthy meals when you have diabetes. Carbohydrate is found in many foods. · Learn which foods have carbs. And learn the amounts of carbs in different foods. ? Bread, cereal, pasta, and rice have about 15 grams of carbs in a serving. A serving is 1 slice of bread (1 ounce), ½ cup of cooked cereal, or 1/3 cup of cooked pasta or rice. ? Fruits have 15 grams of carbs in a serving. A serving is 1 small fresh fruit, such as an apple or orange; ½ of a banana; ½ cup of cooked or canned fruit; ½ cup of fruit juice; 1 cup of melon or raspberries; or 2 tablespoons of dried fruit. ? Milk and no-sugar-added yogurt have 15 grams of carbs in a serving. A serving is 1 cup of milk or 2/3 cup of no-sugar-added yogurt. ? Starchy vegetables have 15 grams of carbs in a serving. A serving is ½ cup of mashed potatoes or sweet potato; 1 cup winter squash; ½ of a small baked potato; ½ cup of cooked beans; or ½ cup cooked corn or green peas. · Learn how much carbs to eat each day and at each meal. A dietitian or CDE can teach you how to keep track of the amount of carbs you eat. This is called carbohydrate counting. · If you are not sure how to count carbohydrate grams, use the Plate Method to plan meals.  It is a good, quick way to make sure that you have a balanced meal. It also helps you spread carbs throughout the day. ? Divide your plate by types of foods. Put non-starchy vegetables on half the plate, meat or other protein food on one-quarter of the plate, and a grain or starchy vegetable in the final quarter of the plate. To this you can add a small piece of fruit and 1 cup of milk or yogurt, depending on how many carbs you are supposed to eat at a meal.  · Try to eat about the same amount of carbs at each meal. Do not \"save up\" your daily allowance of carbs to eat at one meal.  · Proteins have very little or no carbs per serving. Examples of proteins are beef, chicken, turkey, fish, eggs, tofu, cheese, cottage cheese, and peanut butter. A serving size of meat is 3 ounces, which is about the size of a deck of cards. Examples of meat substitute serving sizes (equal to 1 ounce of meat) are 1/4 cup of cottage cheese, 1 egg, 1 tablespoon of peanut butter, and ½ cup of tofu. How can you eat out and still eat healthy? · Learn to estimate the serving sizes of foods that have carbohydrate. If you measure food at home, it will be easier to estimate the amount in a serving of restaurant food. · If the meal you order has too much carbohydrate (such as potatoes, corn, or baked beans), ask to have a low-carbohydrate food instead. Ask for a salad or green vegetables. · If you use insulin, check your blood sugar before and after eating out to help you plan how much to eat in the future. · If you eat more carbohydrate at a meal than you had planned, take a walk or do other exercise. This will help lower your blood sugar. What else should you know? · Limit saturated fat, such as the fat from meat and dairy products. This is a healthy choice because people who have diabetes are at higher risk of heart disease. So choose lean cuts of meat and nonfat or low-fat dairy products.  Use olive or canola oil instead of butter or shortening when cooking. · Don't skip meals. Your blood sugar may drop too low if you skip meals and take insulin or certain medicines for diabetes. · Check with your doctor before you drink alcohol. Alcohol can cause your blood sugar to drop too low. Alcohol can also cause a bad reaction if you take certain diabetes medicines. Follow-up care is a key part of your treatment and safety. Be sure to make and go to all appointments, and call your doctor if you are having problems. It's also a good idea to know your test results and keep a list of the medicines you take. Where can you learn more? Go to [email or web address] org and sign in to your [employer] account. Enter N282 in the [employer] box to learn more about \"Learning About Diabetes Food Guidelines. \"     If you do not have an account, please click on the \"Sign Up Now\" link. Current as of: December 20, 2019               Content Version: 12.5  © 8257-3182 [employer]. Care instructions adapted under license by [facility] ([facility]). If you have questions about a medical condition or this instruction, always ask your healthcare professional. [place] [address] any warranty or liability for your use of this information. Patient Education        Learning About Meal Planning for Diabetes  Why plan your meals? Meal planning can be a key part of managing diabetes. Planning meals and snacks with the right balance of carbohydrate, protein, and fat can help you keep your blood sugar at the target level you set with your doctor. You don't have to eat special foods. You can eat what your family eats, including sweets once in a while. But you do have to pay attention to how often you eat and how much you eat of certain foods. You may want to work with a dietitian or a certified diabetes educator. He or she can give you tips and meal ideas and can answer your questions about meal planning.  This health professional can also help you reach a healthy weight if that is one of your goals. What plan is right for you? Your dietitian or diabetes educator may suggest that you start with the plate format or carbohydrate counting. The plate format  The plate format is a simple way to help you manage how you eat. You plan meals by learning how much space each food should take on a plate. Using the plate format helps you spread carbohydrate throughout the day. It can make it easier to keep your blood sugar level within your target range. It also helps you see if you're eating healthy portion sizes. To use the plate format, you put non-starchy vegetables on half your plate. Add meat or meat substitutes on one-quarter of the plate. Put a grain or starchy vegetable (such as brown rice or a potato) on the final quarter of the plate. You can add a small piece of fruit and some low-fat or fat-free milk or yogurt, depending on your carbohydrate goal for each meal.  Here are some tips for using the plate format:  · Make sure that you are not using an oversized plate. A 9-inch plate is best. Many restaurants use larger plates. · Get used to using the plate format at home. Then you can use it when you eat out. · Write down your questions about using the plate format. Talk to your doctor, a dietitian, or a diabetes educator about your concerns. Carbohydrate counting  With carbohydrate counting, you plan meals based on the amount of carbohydrate in each food. Carbohydrate raises blood sugar higher and more quickly than any other nutrient. It is found in desserts, breads and cereals, and fruit. It's also found in starchy vegetables such as potatoes and corn, grains such as rice and pasta, and milk and yogurt. Spreading carbohydrate throughout the day helps keep your blood sugar levels within your target range.   Your daily amount depends on several things, including your weight, how active you are, which diabetes medicines you take, and what your

## 2020-09-04 DIAGNOSIS — R22.9 SKIN MASS: ICD-10-CM

## 2020-09-04 DIAGNOSIS — R73.03 PREDIABETES: ICD-10-CM

## 2020-09-04 DIAGNOSIS — R35.89 POLYURIA: ICD-10-CM

## 2020-09-04 LAB
A/G RATIO: 1.6 (ref 1.1–2.2)
ALBUMIN SERPL-MCNC: 4.1 G/DL (ref 3.4–5)
ALP BLD-CCNC: 117 U/L (ref 40–129)
ALT SERPL-CCNC: 17 U/L (ref 10–40)
ANION GAP SERPL CALCULATED.3IONS-SCNC: 11 MMOL/L (ref 3–16)
AST SERPL-CCNC: 22 U/L (ref 15–37)
BASOPHILS ABSOLUTE: 0 K/UL (ref 0–0.2)
BASOPHILS RELATIVE PERCENT: 0.7 %
BILIRUB SERPL-MCNC: 0.4 MG/DL (ref 0–1)
BILIRUBIN URINE: NEGATIVE
BLOOD, URINE: NEGATIVE
BUN BLDV-MCNC: 14 MG/DL (ref 7–20)
CALCIUM SERPL-MCNC: 9.6 MG/DL (ref 8.3–10.6)
CHLORIDE BLD-SCNC: 104 MMOL/L (ref 99–110)
CLARITY: ABNORMAL
CO2: 26 MMOL/L (ref 21–32)
COLOR: YELLOW
CREAT SERPL-MCNC: 0.9 MG/DL (ref 0.6–1.2)
CREATININE URINE: 81.9 MG/DL (ref 28–259)
EOSINOPHILS ABSOLUTE: 0.2 K/UL (ref 0–0.6)
EOSINOPHILS RELATIVE PERCENT: 2.9 %
EPITHELIAL CELLS, UA: 1 /HPF (ref 0–5)
ESTIMATED AVERAGE GLUCOSE: 114 MG/DL
GFR AFRICAN AMERICAN: >60
GFR NON-AFRICAN AMERICAN: >60
GLOBULIN: 2.5 G/DL
GLUCOSE BLD-MCNC: 85 MG/DL (ref 70–99)
GLUCOSE URINE: NEGATIVE MG/DL
HBA1C MFR BLD: 5.6 %
HCT VFR BLD CALC: 38.9 % (ref 36–48)
HEMOGLOBIN: 12.6 G/DL (ref 12–16)
HYALINE CASTS: 1 /LPF (ref 0–8)
KETONES, URINE: NEGATIVE MG/DL
LEUKOCYTE ESTERASE, URINE: NEGATIVE
LYMPHOCYTES ABSOLUTE: 2.4 K/UL (ref 1–5.1)
LYMPHOCYTES RELATIVE PERCENT: 40.7 %
MCH RBC QN AUTO: 29.5 PG (ref 26–34)
MCHC RBC AUTO-ENTMCNC: 32.4 G/DL (ref 31–36)
MCV RBC AUTO: 91.1 FL (ref 80–100)
MICROALBUMIN UR-MCNC: 1.5 MG/DL
MICROALBUMIN/CREAT UR-RTO: 18.3 MG/G (ref 0–30)
MICROSCOPIC EXAMINATION: YES
MONOCYTES ABSOLUTE: 0.3 K/UL (ref 0–1.3)
MONOCYTES RELATIVE PERCENT: 5.3 %
NEUTROPHILS ABSOLUTE: 3 K/UL (ref 1.7–7.7)
NEUTROPHILS RELATIVE PERCENT: 50.4 %
NITRITE, URINE: NEGATIVE
PDW BLD-RTO: 14.9 % (ref 12.4–15.4)
PH UA: 6 (ref 5–8)
PLATELET # BLD: 289 K/UL (ref 135–450)
PMV BLD AUTO: 8.6 FL (ref 5–10.5)
POTASSIUM SERPL-SCNC: 5 MMOL/L (ref 3.5–5.1)
PROTEIN UA: NEGATIVE MG/DL
RBC # BLD: 4.28 M/UL (ref 4–5.2)
RBC UA: 1 /HPF (ref 0–4)
SODIUM BLD-SCNC: 141 MMOL/L (ref 136–145)
SPECIFIC GRAVITY UA: 1.02 (ref 1–1.03)
TOTAL PROTEIN: 6.6 G/DL (ref 6.4–8.2)
URINE TYPE: ABNORMAL
UROBILINOGEN, URINE: 0.2 E.U./DL
WBC # BLD: 5.9 K/UL (ref 4–11)
WBC UA: 2 /HPF (ref 0–5)

## 2020-09-07 ENCOUNTER — OFFICE VISIT (OUTPATIENT)
Dept: PRIMARY CARE CLINIC | Age: 60
End: 2020-09-07
Payer: COMMERCIAL

## 2020-09-07 PROCEDURE — 99211 OFF/OP EST MAY X REQ PHY/QHP: CPT | Performed by: NURSE PRACTITIONER

## 2020-09-07 NOTE — PATIENT INSTRUCTIONS

## 2020-09-08 LAB — SARS-COV-2, NAA: NOT DETECTED

## 2020-09-09 NOTE — PROGRESS NOTES
stay on site while you are here and take you home after your surgery. You will not be allowed to leave alone or drive yourself home. It is strongly suggested someone stay with you the first 24 hrs. Your surgery will be cancelled if you do not have a ride home. 8. A parent/legal guardian must accompany a child scheduled for surgery and plan to stay at the hospital until the child is discharged. Please do not bring other children with you. 9. Please wear simple, loose fitting clothing to the hospital.  Florinda Low not bring valuables (money, credit cards, checkbooks, etc.) Do not wear any makeup (including no eye makeup) or nail polish on your fingers or toes. 10. DO NOT wear any jewelry or piercings on day of surgery. All body piercing jewelry must be removed. 11. If you have ___dentures, they will be removed before going to the OR; we will provide you a container. If you wear ___contact lenses or ___glasses, they will be removed; please bring a case for them. 12. Please see your family doctor/pediatrician for a history & physical and/or concerning medications. Bring any test results/reports from your physician's office. PCP__________________Phone___________H&P Appt. Date________             13 If you  have a Living Will and Durable Power of  for Healthcare, please bring in a copy. 15. Notify your Surgeon if you develop any illness between now and surgery  time, cough, cold, fever, sore throat, nausea, vomiting, etc.  Please notify your surgeon if you experience dizziness, shortness of breath or blurred vision between now & the time of your surgery             15. DO NOT shave your operative site 96 hours prior to surgery. For face & neck surgery, men may use an electric razor 48 hours prior to surgery. 16. Shower the night before or morning of surgery using an antibacterial soap or as you have been instructed.              17. To provide excellent care visitors will be limited to one in the room at any given time. 18.  Please bring picture ID and insurance card. 19.  Visit our web site for additional information:  [employer]. [email or web address]              20.During flu season no children under the age of 15 are permitted in the hospital for the safety of all patients. 21. If you take a long acting insulin in the evening only  take half of your usual  dose the night  before your procedure              22. If you use a c-pap please bring DOS if staying overnight,             23.For your convenience [facility] has a pharmacy on site to fill your prescriptions. 24. If you use oxygen and have a portable tank please bring it  with you the DOS             25. Bring a complete list of all your medications with name and dose include any supplements. 32. Other___There is a one visitor policy at [facility] for all surgeries and endoscopies. Whether the visitor can stay or will be asked to wait in the car will depend on the current policy and if social distancing can be maintained. The policy is subject to change at any time. Please make sure the visitor has a cell phone that is on,charged and able to accept calls, as this may be the way that the staff communicates with them. Pain management is NO VISITOR policyThe patients ride is expected to remain in the car with a cell phone for communication. If the ride is leaving the hospital grounds please make sure they are back in time for pickup. Have the patient inform the staff on arrival what their rides plans are while the patient is in the facility. At the MAIN there is one visitor allowed. Please note that the visitor policy is subject to change._______________________________________   *Please call pre admission testing if you any further questions   Romulo Lopez [address] [place] [address].  Ya Mutmarcus  [phone]   [facility] [address] RICHAR MONSALVE

## 2020-09-10 ENCOUNTER — ANESTHESIA EVENT (OUTPATIENT)
Dept: OPERATING ROOM | Age: 60
End: 2020-09-10
Payer: COMMERCIAL

## 2020-09-11 ENCOUNTER — HOSPITAL ENCOUNTER (OUTPATIENT)
Age: 60
Setting detail: OUTPATIENT SURGERY
Discharge: HOME | End: 2020-09-11
Attending: OTOLARYNGOLOGY | Admitting: OTOLARYNGOLOGY
Payer: COMMERCIAL

## 2020-09-11 ENCOUNTER — ANESTHESIA (OUTPATIENT)
Dept: OPERATING ROOM | Age: 60
End: 2020-09-11
Payer: COMMERCIAL

## 2020-09-11 VITALS
TEMPERATURE: 97.2 F | DIASTOLIC BLOOD PRESSURE: 101 MMHG | SYSTOLIC BLOOD PRESSURE: 161 MMHG | OXYGEN SATURATION: 86 % | RESPIRATION RATE: 5 BRPM

## 2020-09-11 VITALS
BODY MASS INDEX: 29.93 KG/M2 | OXYGEN SATURATION: 97 % | DIASTOLIC BLOOD PRESSURE: 77 MMHG | HEIGHT: 72 IN | TEMPERATURE: 97.6 F | WEIGHT: 221 LBS | SYSTOLIC BLOOD PRESSURE: 103 MMHG | RESPIRATION RATE: 16 BRPM | HEART RATE: 71 BPM

## 2020-09-11 PROBLEM — H72.2X2 MARGINAL PERFORATION OF TYMPANIC MEMBRANE OF LEFT EAR: Chronic | Status: ACTIVE | Noted: 2020-08-10

## 2020-09-11 PROBLEM — H66.12 CHRONIC TUBOTYMPANIC SUPPURATIVE OTITIS MEDIA, LEFT EAR: Chronic | Status: ACTIVE | Noted: 2020-09-11

## 2020-09-11 PROBLEM — H90.A12 CONDUCTIVE HEARING LOSS OF LEFT EAR WITH RESTRICTED HEARING OF RIGHT EAR: Chronic | Status: ACTIVE | Noted: 2020-09-11

## 2020-09-11 PROBLEM — H90.6 MIXED HEARING LOSS, BILATERAL: Status: ACTIVE | Noted: 2020-09-11

## 2020-09-11 PROBLEM — H66.13 CHRONIC TUBOTYMPANIC SUPPURATIVE OTITIS MEDIA OF BOTH EARS: Status: ACTIVE | Noted: 2020-09-11

## 2020-09-11 LAB
GLUCOSE BLD-MCNC: 106 MG/DL (ref 70–99)
PERFORMED ON: ABNORMAL

## 2020-09-11 PROCEDURE — 3600000014 HC SURGERY LEVEL 4 ADDTL 15MIN: Performed by: OTOLARYNGOLOGY

## 2020-09-11 PROCEDURE — 88304 TISSUE EXAM BY PATHOLOGIST: CPT

## 2020-09-11 PROCEDURE — 15769 GRFG AUTOL SOFT TISS DIR EXC: CPT | Performed by: OTOLARYNGOLOGY

## 2020-09-11 PROCEDURE — 69631 REPAIR EARDRUM STRUCTURES: CPT | Performed by: OTOLARYNGOLOGY

## 2020-09-11 PROCEDURE — 6360000002 HC RX W HCPCS: Performed by: ANESTHESIOLOGY

## 2020-09-11 PROCEDURE — 2500000003 HC RX 250 WO HCPCS: Performed by: OTOLARYNGOLOGY

## 2020-09-11 PROCEDURE — 2580000003 HC RX 258: Performed by: OTOLARYNGOLOGY

## 2020-09-11 PROCEDURE — 3700000000 HC ANESTHESIA ATTENDED CARE: Performed by: OTOLARYNGOLOGY

## 2020-09-11 PROCEDURE — 6370000000 HC RX 637 (ALT 250 FOR IP)

## 2020-09-11 PROCEDURE — 6360000002 HC RX W HCPCS: Performed by: NURSE ANESTHETIST, CERTIFIED REGISTERED

## 2020-09-11 PROCEDURE — 6370000000 HC RX 637 (ALT 250 FOR IP): Performed by: ANESTHESIOLOGY

## 2020-09-11 PROCEDURE — 7100000001 HC PACU RECOVERY - ADDTL 15 MIN: Performed by: OTOLARYNGOLOGY

## 2020-09-11 PROCEDURE — 2500000003 HC RX 250 WO HCPCS: Performed by: NURSE ANESTHETIST, CERTIFIED REGISTERED

## 2020-09-11 PROCEDURE — 7100000010 HC PHASE II RECOVERY - FIRST 15 MIN: Performed by: OTOLARYNGOLOGY

## 2020-09-11 PROCEDURE — 3700000001 HC ADD 15 MINUTES (ANESTHESIA): Performed by: OTOLARYNGOLOGY

## 2020-09-11 PROCEDURE — 2709999900 HC NON-CHARGEABLE SUPPLY: Performed by: OTOLARYNGOLOGY

## 2020-09-11 PROCEDURE — 7100000000 HC PACU RECOVERY - FIRST 15 MIN: Performed by: OTOLARYNGOLOGY

## 2020-09-11 PROCEDURE — 6370000000 HC RX 637 (ALT 250 FOR IP): Performed by: OTOLARYNGOLOGY

## 2020-09-11 PROCEDURE — 6360000002 HC RX W HCPCS: Performed by: OTOLARYNGOLOGY

## 2020-09-11 PROCEDURE — 7100000011 HC PHASE II RECOVERY - ADDTL 15 MIN: Performed by: OTOLARYNGOLOGY

## 2020-09-11 PROCEDURE — 3600000004 HC SURGERY LEVEL 4 BASE: Performed by: OTOLARYNGOLOGY

## 2020-09-11 RX ORDER — LABETALOL HYDROCHLORIDE 5 MG/ML
5 INJECTION, SOLUTION INTRAVENOUS EVERY 10 MIN PRN
Status: DISCONTINUED | OUTPATIENT
Start: 2020-09-11 | End: 2020-09-11 | Stop reason: HOSPADM

## 2020-09-11 RX ORDER — LIDOCAINE HYDROCHLORIDE 20 MG/ML
INJECTION, SOLUTION EPIDURAL; INFILTRATION; INTRACAUDAL; PERINEURAL PRN
Status: DISCONTINUED | OUTPATIENT
Start: 2020-09-11 | End: 2020-09-11 | Stop reason: SDUPTHER

## 2020-09-11 RX ORDER — HYDROCODONE BITARTRATE AND ACETAMINOPHEN 5; 325 MG/1; MG/1
TABLET ORAL
Status: COMPLETED
Start: 2020-09-11 | End: 2020-09-11

## 2020-09-11 RX ORDER — CIPROFLOXACIN AND DEXAMETHASONE 3; 1 MG/ML; MG/ML
SUSPENSION/ DROPS AURICULAR (OTIC)
Status: COMPLETED | OUTPATIENT
Start: 2020-09-11 | End: 2020-09-11

## 2020-09-11 RX ORDER — SODIUM CHLORIDE 0.9 % (FLUSH) 0.9 %
10 SYRINGE (ML) INJECTION PRN
Status: DISCONTINUED | OUTPATIENT
Start: 2020-09-11 | End: 2020-09-11 | Stop reason: HOSPADM

## 2020-09-11 RX ORDER — DEXAMETHASONE SODIUM PHOSPHATE 1 MG/ML
SOLUTION/ DROPS OPHTHALMIC
Status: COMPLETED | OUTPATIENT
Start: 2020-09-11 | End: 2020-09-11

## 2020-09-11 RX ORDER — FENTANYL CITRATE 50 UG/ML
INJECTION, SOLUTION INTRAMUSCULAR; INTRAVENOUS PRN
Status: DISCONTINUED | OUTPATIENT
Start: 2020-09-11 | End: 2020-09-11 | Stop reason: SDUPTHER

## 2020-09-11 RX ORDER — SODIUM CHLORIDE, SODIUM LACTATE, POTASSIUM CHLORIDE, CALCIUM CHLORIDE 600; 310; 30; 20 MG/100ML; MG/100ML; MG/100ML; MG/100ML
INJECTION, SOLUTION INTRAVENOUS CONTINUOUS
Status: DISCONTINUED | OUTPATIENT
Start: 2020-09-11 | End: 2020-09-11 | Stop reason: HOSPADM

## 2020-09-11 RX ORDER — DEXAMETHASONE SODIUM PHOSPHATE 1 MG/ML
4 SOLUTION/ DROPS OPHTHALMIC PRN
Status: DISCONTINUED | OUTPATIENT
Start: 2020-09-11 | End: 2020-09-11 | Stop reason: HOSPADM

## 2020-09-11 RX ORDER — HYDROMORPHONE HCL 110MG/55ML
0.5 PATIENT CONTROLLED ANALGESIA SYRINGE INTRAVENOUS EVERY 5 MIN PRN
Status: DISCONTINUED | OUTPATIENT
Start: 2020-09-11 | End: 2020-09-11 | Stop reason: HOSPADM

## 2020-09-11 RX ORDER — BACITRACIN ZINC AND POLYMYXIN B SULFATE 500; 1000 [USP'U]/G; [USP'U]/G
OINTMENT TOPICAL
Status: COMPLETED | OUTPATIENT
Start: 2020-09-11 | End: 2020-09-11

## 2020-09-11 RX ORDER — ONDANSETRON 2 MG/ML
4 INJECTION INTRAMUSCULAR; INTRAVENOUS
Status: DISCONTINUED | OUTPATIENT
Start: 2020-09-11 | End: 2020-09-11 | Stop reason: HOSPADM

## 2020-09-11 RX ORDER — MIDAZOLAM HYDROCHLORIDE 1 MG/ML
INJECTION INTRAMUSCULAR; INTRAVENOUS PRN
Status: DISCONTINUED | OUTPATIENT
Start: 2020-09-11 | End: 2020-09-11 | Stop reason: SDUPTHER

## 2020-09-11 RX ORDER — SUCCINYLCHOLINE/SOD CL,ISO/PF 200MG/10ML
SYRINGE (ML) INTRAVENOUS PRN
Status: DISCONTINUED | OUTPATIENT
Start: 2020-09-11 | End: 2020-09-11 | Stop reason: SDUPTHER

## 2020-09-11 RX ORDER — HYDROCODONE BITARTRATE AND ACETAMINOPHEN 5; 325 MG/1; MG/1
1 TABLET ORAL ONCE
Status: COMPLETED | OUTPATIENT
Start: 2020-09-11 | End: 2020-09-11

## 2020-09-11 RX ORDER — APREPITANT 40 MG/1
40 CAPSULE ORAL ONCE
Status: COMPLETED | OUTPATIENT
Start: 2020-09-11 | End: 2020-09-11

## 2020-09-11 RX ORDER — METHYLENE BLUE 10 MG/ML
INJECTION INTRAVENOUS
Status: COMPLETED | OUTPATIENT
Start: 2020-09-11 | End: 2020-09-11

## 2020-09-11 RX ORDER — PROPOFOL 10 MG/ML
INJECTION, EMULSION INTRAVENOUS PRN
Status: DISCONTINUED | OUTPATIENT
Start: 2020-09-11 | End: 2020-09-11 | Stop reason: SDUPTHER

## 2020-09-11 RX ORDER — LIDOCAINE HYDROCHLORIDE 10 MG/ML
1 INJECTION, SOLUTION EPIDURAL; INFILTRATION; INTRACAUDAL; PERINEURAL
Status: DISCONTINUED | OUTPATIENT
Start: 2020-09-11 | End: 2020-09-11 | Stop reason: HOSPADM

## 2020-09-11 RX ORDER — ACETAMINOPHEN 500 MG
1000 TABLET ORAL ONCE
Status: COMPLETED | OUTPATIENT
Start: 2020-09-11 | End: 2020-09-11

## 2020-09-11 RX ORDER — HYDROCODONE BITARTRATE AND ACETAMINOPHEN 5; 325 MG/1; MG/1
1 TABLET ORAL EVERY 4 HOURS PRN
Qty: 36 TABLET | Refills: 0 | Status: SHIPPED | OUTPATIENT
Start: 2020-09-11 | End: 2020-09-17

## 2020-09-11 RX ORDER — ROCURONIUM BROMIDE 10 MG/ML
INJECTION, SOLUTION INTRAVENOUS PRN
Status: DISCONTINUED | OUTPATIENT
Start: 2020-09-11 | End: 2020-09-11 | Stop reason: SDUPTHER

## 2020-09-11 RX ORDER — SODIUM CHLORIDE 0.9 % (FLUSH) 0.9 %
10 SYRINGE (ML) INJECTION EVERY 12 HOURS SCHEDULED
Status: DISCONTINUED | OUTPATIENT
Start: 2020-09-11 | End: 2020-09-11 | Stop reason: HOSPADM

## 2020-09-11 RX ORDER — ONDANSETRON 2 MG/ML
INJECTION INTRAMUSCULAR; INTRAVENOUS PRN
Status: DISCONTINUED | OUTPATIENT
Start: 2020-09-11 | End: 2020-09-11 | Stop reason: SDUPTHER

## 2020-09-11 RX ORDER — PROMETHAZINE HYDROCHLORIDE 25 MG/1
25 TABLET ORAL EVERY 6 HOURS PRN
Qty: 40 TABLET | Refills: 0 | Status: ON HOLD | OUTPATIENT
Start: 2020-09-11 | End: 2021-03-05 | Stop reason: SDUPTHER

## 2020-09-11 RX ORDER — LIDOCAINE HYDROCHLORIDE AND EPINEPHRINE 10; 10 MG/ML; UG/ML
INJECTION, SOLUTION INFILTRATION; PERINEURAL
Status: COMPLETED | OUTPATIENT
Start: 2020-09-11 | End: 2020-09-11

## 2020-09-11 RX ORDER — FENTANYL CITRATE 50 UG/ML
25 INJECTION, SOLUTION INTRAMUSCULAR; INTRAVENOUS EVERY 5 MIN PRN
Status: DISCONTINUED | OUTPATIENT
Start: 2020-09-11 | End: 2020-09-11 | Stop reason: HOSPADM

## 2020-09-11 RX ORDER — CIPROFLOXACIN HYDROCHLORIDE 3.5 MG/ML
4 SOLUTION/ DROPS TOPICAL PRN
Status: DISCONTINUED | OUTPATIENT
Start: 2020-09-11 | End: 2020-09-11 | Stop reason: HOSPADM

## 2020-09-11 RX ORDER — DEXAMETHASONE SODIUM PHOSPHATE 4 MG/ML
INJECTION, SOLUTION INTRA-ARTICULAR; INTRALESIONAL; INTRAMUSCULAR; INTRAVENOUS; SOFT TISSUE PRN
Status: DISCONTINUED | OUTPATIENT
Start: 2020-09-11 | End: 2020-09-11 | Stop reason: SDUPTHER

## 2020-09-11 RX ORDER — PROCHLORPERAZINE EDISYLATE 5 MG/ML
5 INJECTION INTRAMUSCULAR; INTRAVENOUS
Status: DISCONTINUED | OUTPATIENT
Start: 2020-09-11 | End: 2020-09-11 | Stop reason: HOSPADM

## 2020-09-11 RX ORDER — HYDRALAZINE HYDROCHLORIDE 20 MG/ML
5 INJECTION INTRAMUSCULAR; INTRAVENOUS EVERY 10 MIN PRN
Status: DISCONTINUED | OUTPATIENT
Start: 2020-09-11 | End: 2020-09-11 | Stop reason: HOSPADM

## 2020-09-11 RX ORDER — CLINDAMYCIN PHOSPHATE 900 MG/50ML
900 INJECTION INTRAVENOUS ONCE
Status: COMPLETED | OUTPATIENT
Start: 2020-09-11 | End: 2020-09-11

## 2020-09-11 RX ORDER — CLARITHROMYCIN 500 MG/1
500 TABLET, COATED ORAL 2 TIMES DAILY
Qty: 28 TABLET | Refills: 0 | Status: SHIPPED | OUTPATIENT
Start: 2020-09-11 | End: 2020-09-18

## 2020-09-11 RX ORDER — SCOLOPAMINE TRANSDERMAL SYSTEM 1 MG/1
1 PATCH, EXTENDED RELEASE TRANSDERMAL ONCE
Status: DISCONTINUED | OUTPATIENT
Start: 2020-09-11 | End: 2020-09-11 | Stop reason: HOSPADM

## 2020-09-11 RX ADMIN — SODIUM CHLORIDE, POTASSIUM CHLORIDE, SODIUM LACTATE AND CALCIUM CHLORIDE: 600; 310; 30; 20 INJECTION, SOLUTION INTRAVENOUS at 07:32

## 2020-09-11 RX ADMIN — FENTANYL CITRATE 50 MCG: 50 INJECTION, SOLUTION INTRAMUSCULAR; INTRAVENOUS at 07:35

## 2020-09-11 RX ADMIN — Medication 140 MG: at 07:36

## 2020-09-11 RX ADMIN — APREPITANT 40 MG: 40 CAPSULE ORAL at 07:25

## 2020-09-11 RX ADMIN — PHENYLEPHRINE HYDROCHLORIDE 100 MCG: 10 INJECTION INTRAVENOUS at 08:25

## 2020-09-11 RX ADMIN — SODIUM CHLORIDE, POTASSIUM CHLORIDE, SODIUM LACTATE AND CALCIUM CHLORIDE: 600; 310; 30; 20 INJECTION, SOLUTION INTRAVENOUS at 09:37

## 2020-09-11 RX ADMIN — LIDOCAINE HYDROCHLORIDE 100 MG: 20 INJECTION, SOLUTION EPIDURAL; INFILTRATION; INTRACAUDAL; PERINEURAL at 07:36

## 2020-09-11 RX ADMIN — PHENYLEPHRINE HYDROCHLORIDE 100 MCG: 10 INJECTION INTRAVENOUS at 08:03

## 2020-09-11 RX ADMIN — PHENYLEPHRINE HYDROCHLORIDE 100 MCG: 10 INJECTION INTRAVENOUS at 08:28

## 2020-09-11 RX ADMIN — PHENYLEPHRINE HYDROCHLORIDE 100 MCG: 10 INJECTION INTRAVENOUS at 08:14

## 2020-09-11 RX ADMIN — HYDROCODONE BITARTRATE AND ACETAMINOPHEN 1 TABLET: 5; 325 TABLET ORAL at 11:18

## 2020-09-11 RX ADMIN — MIDAZOLAM 2 MG: 1 INJECTION INTRAMUSCULAR; INTRAVENOUS at 07:29

## 2020-09-11 RX ADMIN — ACETAMINOPHEN 1000 MG: 500 TABLET, FILM COATED ORAL at 07:25

## 2020-09-11 RX ADMIN — DEXAMETHASONE SODIUM PHOSPHATE 8 MG: 4 INJECTION, SOLUTION INTRAMUSCULAR; INTRAVENOUS at 07:59

## 2020-09-11 RX ADMIN — SODIUM CHLORIDE, POTASSIUM CHLORIDE, SODIUM LACTATE AND CALCIUM CHLORIDE: 600; 310; 30; 20 INJECTION, SOLUTION INTRAVENOUS at 06:31

## 2020-09-11 RX ADMIN — ROCURONIUM BROMIDE 25 MG: 10 INJECTION, SOLUTION INTRAVENOUS at 07:59

## 2020-09-11 RX ADMIN — PROPOFOL 200 MG: 10 INJECTION, EMULSION INTRAVENOUS at 07:36

## 2020-09-11 RX ADMIN — PHENYLEPHRINE HYDROCHLORIDE 100 MCG: 10 INJECTION INTRAVENOUS at 08:18

## 2020-09-11 RX ADMIN — ONDANSETRON 4 MG: 2 INJECTION INTRAMUSCULAR; INTRAVENOUS at 07:59

## 2020-09-11 RX ADMIN — CLINDAMYCIN IN 5 PERCENT DEXTROSE 900 MG: 18 INJECTION, SOLUTION INTRAVENOUS at 07:26

## 2020-09-11 ASSESSMENT — PULMONARY FUNCTION TESTS
PIF_VALUE: 24
PIF_VALUE: 2
PIF_VALUE: 24
PIF_VALUE: 0
PIF_VALUE: 25
PIF_VALUE: 24
PIF_VALUE: 23
PIF_VALUE: 24
PIF_VALUE: 25
PIF_VALUE: 24
PIF_VALUE: 25
PIF_VALUE: 24
PIF_VALUE: 25
PIF_VALUE: 24
PIF_VALUE: 25
PIF_VALUE: 24
PIF_VALUE: 24
PIF_VALUE: 19
PIF_VALUE: 24
PIF_VALUE: 25
PIF_VALUE: 22
PIF_VALUE: 25
PIF_VALUE: 24
PIF_VALUE: 23
PIF_VALUE: 24
PIF_VALUE: 22
PIF_VALUE: 24
PIF_VALUE: 24
PIF_VALUE: 23
PIF_VALUE: 0
PIF_VALUE: 24
PIF_VALUE: 24
PIF_VALUE: 26
PIF_VALUE: 25
PIF_VALUE: 23
PIF_VALUE: 24
PIF_VALUE: 24
PIF_VALUE: 22
PIF_VALUE: 23
PIF_VALUE: 24
PIF_VALUE: 21
PIF_VALUE: 24
PIF_VALUE: 23
PIF_VALUE: 23
PIF_VALUE: 25
PIF_VALUE: 24
PIF_VALUE: 25
PIF_VALUE: 24
PIF_VALUE: 0
PIF_VALUE: 24
PIF_VALUE: 23
PIF_VALUE: 30
PIF_VALUE: 22
PIF_VALUE: 20
PIF_VALUE: 24
PIF_VALUE: 25
PIF_VALUE: 24
PIF_VALUE: 2
PIF_VALUE: 23
PIF_VALUE: 25
PIF_VALUE: 22
PIF_VALUE: 24
PIF_VALUE: 24
PIF_VALUE: 25
PIF_VALUE: 23
PIF_VALUE: 24
PIF_VALUE: 22
PIF_VALUE: 24
PIF_VALUE: 21
PIF_VALUE: 25
PIF_VALUE: 24
PIF_VALUE: 24
PIF_VALUE: 27
PIF_VALUE: 22
PIF_VALUE: 25
PIF_VALUE: 25
PIF_VALUE: 22
PIF_VALUE: 23
PIF_VALUE: 24
PIF_VALUE: 21
PIF_VALUE: 23
PIF_VALUE: 24
PIF_VALUE: 25
PIF_VALUE: 24
PIF_VALUE: 24
PIF_VALUE: 25
PIF_VALUE: 25
PIF_VALUE: 20
PIF_VALUE: 25
PIF_VALUE: 24
PIF_VALUE: 18
PIF_VALUE: 24
PIF_VALUE: 23
PIF_VALUE: 24
PIF_VALUE: 24
PIF_VALUE: 22
PIF_VALUE: 2
PIF_VALUE: 24
PIF_VALUE: 24
PIF_VALUE: 25
PIF_VALUE: 24
PIF_VALUE: 23
PIF_VALUE: 25
PIF_VALUE: 23
PIF_VALUE: 24
PIF_VALUE: 24
PIF_VALUE: 20
PIF_VALUE: 24
PIF_VALUE: 24
PIF_VALUE: 22
PIF_VALUE: 24
PIF_VALUE: 21
PIF_VALUE: 24
PIF_VALUE: 22
PIF_VALUE: 24
PIF_VALUE: 1
PIF_VALUE: 23
PIF_VALUE: 0
PIF_VALUE: 23
PIF_VALUE: 22
PIF_VALUE: 25
PIF_VALUE: 25
PIF_VALUE: 24
PIF_VALUE: 24
PIF_VALUE: 23
PIF_VALUE: 22
PIF_VALUE: 24
PIF_VALUE: 0
PIF_VALUE: 24
PIF_VALUE: 25
PIF_VALUE: 23
PIF_VALUE: 25
PIF_VALUE: 25
PIF_VALUE: 22
PIF_VALUE: 24
PIF_VALUE: 24

## 2020-09-11 ASSESSMENT — PAIN SCALES - GENERAL
PAINLEVEL_OUTOF10: 2
PAINLEVEL_OUTOF10: 4
PAINLEVEL_OUTOF10: 0

## 2020-09-11 ASSESSMENT — PAIN DESCRIPTION - ORIENTATION: ORIENTATION: LEFT

## 2020-09-11 ASSESSMENT — PAIN DESCRIPTION - LOCATION: LOCATION: EAR

## 2020-09-11 ASSESSMENT — PAIN DESCRIPTION - PAIN TYPE: TYPE: SURGICAL PAIN

## 2020-09-11 ASSESSMENT — PAIN - FUNCTIONAL ASSESSMENT: PAIN_FUNCTIONAL_ASSESSMENT: 0-10

## 2020-09-11 NOTE — ANESTHESIA PRE PROCEDURE
days then 1 tab BID 8/11/19   Nina Head MD       Current medications:    Current Facility-Administered Medications   Medication Dose Route Frequency Provider Last Rate Last Dose    lactated ringers infusion   Intravenous Continuous Janette Nicholas MD 50 mL/hr at 09/11/20 0631      clindamycin (CLEOCIN) 900 mg in dextrose 5 % 50 mL IVPB  900 mg Intravenous Once Janette Nicholas MD           Allergies: Allergies   Allergen Reactions    Keflex [Cephalexin] Nausea And Vomiting     vomiting    Adhesive Tape Other (See Comments)     Red & painful-blisters-severe    Morphine Other (See Comments)     Hyper. Pt refuse       Problem List:    Patient Active Problem List   Diagnosis Code    Herpes zoster B02.9    Neuropathy G62.9    Sick sinus syndrome ([facility] I49.5    Thyroid nodule E04.1    Classic migraine G43. 109    Degenerated intervertebral disc [id number]    GERD (gastroesophageal reflux disease) K21.9    Rheumatoid arthritis of hand ([facility] M06.9    Scoliosis M41.9    Chondromalacia of patella M22.40    Primary localized osteoarthrosis, lower leg M17.10    Trochanteric bursitis of right hip M70.61    Left hip pain M25.552    Knee pain M25.569    Enchondroma of femur D16.20    Right ankle sprain S93.401A    Fibula fracture S82.409A    Family history of type 2 diabetes mellitus in father Z80.1    Skin cancer C44.90    Splenomegaly R16.1    Gastric ulcer K25.9    Primary osteoarthritis of both knees M17.0    Trochanteric bursitis, left hip M70.62    Cough variant asthma J45.991    Multiple fractures of ribs, bilateral, subsequent encounter for fracture with delayed healing S22.43XG    Greater trochanteric bursitis of left hip M70.62    Right rotator cuff tendonitis M75.81    Rotator cuff tendonitis, left M75.82    Postmenopausal osteoporosis M81.0    Multiple thyroid nodules E04.2    S/P partial thyroidectomy Z98.890    History of bisphosphonate therapy Z92.29    Mixed hyperlipidemia E78.2    Acute suppr otitis media w/o spon rupt ear drum, right ear H66.001    Acute swimmer's ear of left side H60.332    Recurrent acute suppurative otitis media without spontaneous rupture of tympanic membrane of both sides H66.006    Acute rhinosinusitis J01.90    Allergic arthritis of left hip M13.852    Myofascial pain M79.18    Acute saddle pulmonary embolism without acute cor pulmonale (HCC) I26.92    Pulmonary embolism affecting pregnancy in first trimester O88.211    Acute saddle pulmonary embolism with acute cor pulmonale (HCC) I26.02    Acute respiratory failure with hypoxia ([facility]) J96.01    Acute deep vein thrombosis (DVT) of lower extremity ([facility]) I82.409    Lupus anticoagulant positive R76.0    Bilateral leg edema R60.0    Goiter V71.8    Diastolic hypertension Q40    Otalgia of left ear H92.02    Perforation of left tympanic membrane H72.92    Bilateral chronic serous otitis media H65.23    Body mass index (BMI) of 25.0 to 29.9 E66.3    Chronic anticoagulation Z79.01    Heart murmur R01.1    History of pulmonary embolism Z86.711    Irritable bowel syndrome K58.9    MAGGIE (obstructive sleep apnea) G47.33    Presence of cardiac pacemaker Z95.0    Seasonal allergic rhinitis J30.2    Sensorineural hearing loss, bilateral H90.3    Central perforation of tympanic membrane of left ear H72.02    Marginal perforation of tympanic membrane of right ear H72.2X1    Bilateral hearing loss H91.93    Raynaud's disease I73.00    Chronic tubotympanic suppurative otitis media of both ears H66.13    Mixed hearing loss, bilateral H90.6       Past Medical History:        Diagnosis Date    Bradycardia     Bronchitis     Cancer ([facility] [address] 2009    BCC Rt leg,squamous cell,LEFT LEG,FACE-BASAL CELL    Classic migraine     Degenerated intervertebral disc     GERD (gastroesophageal reflux disease)     Hx of blood clots     neck,arm after first pacemaker inserted    Hypercholesterolemia     IBS (irritable bowel syndrome)     Nausea & vomiting     Neuropathy     Osteoporosis     Patent foramen ovale     surgery as child    Peripheral vascular disease ([address]     PONV (postoperative nausea and vomiting)     family hx also of post op n/v    Pulmonary embolism ([address]     Rheumatoid arthritis of the hand     left thumb MCP joint , left great toe MTP joint    Seizure ([address]     as a child due to hole in heart-last one [address] sinus syndrome ([address]     s/p pacemaker 2008    Sleep apnea     NO C-PAP    Thyroid nodule     benign s/p surgery-GOITER-REMOVED       Past Surgical History:        Procedure Laterality Date    CARDIAC SURGERY  01/1965    Congenital heart defect    COLONOSCOPY      SEVERAL    ESOPHAGEAL DILATATION N/A 6/20/2019    ESOPHAGEAL DILATION VALLECILLO performed by Genesis Hardwick MD at [address] [place] [address] ARTHROSCOPY Right     torn meniscous    KNEE SURGERY Left 2010   [address]   [address] [facility] & 2007    Placement under stomach muscle    PACEMAKER PLACEMENT  2016    THYROIDECTOMY, PARTIAL Right 2011    TONSILLECTOMY AND ADENOIDECTOMY  1978    TYMPANOSTOMY TUBE PLACEMENT      UPPER GASTROINTESTINAL ENDOSCOPY  04/17/2017    UPPER GASTROINTESTINAL ENDOSCOPY  07/06/2017    UPPER GASTROINTESTINAL ENDOSCOPY N/A 6/20/2019    EGD BIOPSY performed by Genesis Hardwick MD at [facility] History:    Social History     Tobacco Use    Smoking status: Never Smoker    Smokeless tobacco: Never Used   Substance Use Topics    Alcohol use: Yes     Comment: Ocassional-rare                                Counseling given: Not Answered      Vital Signs (Current):   Vitals:    09/09/20 0957 09/11/20 0610   BP:  (!) 146/86   Pulse:  94   Resp:  12   Temp:  98 °F (36.7 °C)   TempSrc:  Temporal   SpO2:  95%   Weight: 215 lb (97.5 kg) 221 lb (100.2 kg)   Height: 6' 1\" (1.854 m) 6' 1\" (1.854 m) BP Readings from Last 3 Encounters:   09/11/20 (!) 146/86   08/31/20 130/88   08/24/20 138/87       NPO Status: Time of last liquid consumption: 2100                        Time of last solid consumption: 2100                        Date of last liquid consumption: 09/10/20                        Date of last solid food consumption: 09/10/20    BMI:   Wt Readings from Last 3 Encounters:   09/11/20 221 lb (100.2 kg)   08/31/20 220 lb (99.8 kg)   08/24/20 217 lb (98.4 kg)     Body mass index is 29.16 kg/m². CBC:   Lab Results   Component Value Date    WBC 5.9 09/04/2020    RBC 4.28 09/04/2020    HGB 12.6 09/04/2020    HCT 38.9 09/04/2020    MCV 91.1 09/04/2020    RDW 14.9 09/04/2020     09/04/2020       CMP:   Lab Results   Component Value Date     09/04/2020    K 5.0 09/04/2020    K 4.0 04/08/2020     09/04/2020    CO2 26 09/04/2020    BUN 14 09/04/2020    CREATININE 0.9 09/04/2020    GFRAA >60 09/04/2020    GFRAA >60 05/22/2013    AGRATIO 1.6 09/04/2020    LABGLOM >60 09/04/2020    GLUCOSE 85 09/04/2020    PROT 6.6 09/04/2020    PROT 8.0 10/16/2012    CALCIUM 9.6 09/04/2020    BILITOT 0.4 09/04/2020    ALKPHOS 117 09/04/2020    AST 22 09/04/2020    ALT 17 09/04/2020       POC Tests: No results for input(s): POCGLU, POCNA, POCK, POCCL, POCBUN, POCHEMO, POCHCT in the last 72 hours.     Coags:   Lab Results   Component Value Date    PROTIME 10.7 01/11/2016    INR 0.94 01/11/2016    APTT 36.6 08/10/2019       HCG (If Applicable):   Lab Results   Component Value Date    PREGTESTUR Negative 03/30/2017        ABGs: No results found for: PHART, PO2ART, OMM0EPG, HLT9SKX, BEART, W3VBGKMO     Type & Screen (If Applicable):  No results found for: LABABO, LABRH    Drug/Infectious Status (If Applicable):  No results found for: HIV, HEPCAB    COVID-19 Screening (If Applicable):   Lab Results   Component Value Date    COVID19 NOT DETECTED 09/07/2020         Anesthesia Evaluation history of anesthetic complications: PONV. Airway: Mallampati: I  TM distance: >3 FB   Neck ROM: full  Mouth opening: > = 3 FB Dental:    (+) implants      Pulmonary:   (+) sleep apnea:  asthma:                            Cardiovascular:    (+) hypertension:, pacemaker (SSS s/p placement 2016): pacemaker,             Echocardiogram reviewed                  Neuro/Psych:   (+) seizures:, headaches: migraine headaches,             GI/Hepatic/Renal:   (+) GERD:, PUD,           Endo/Other:    (+) : arthritis: rheumatoid. , malignancy/cancer. Abdominal:           Vascular:   + PVD, aortic or cerebral, DVT, PE. Anesthesia Plan      general     ASA 3       Induction: intravenous. MIPS: Postoperative opioids intended and Prophylactic antiemetics administered. Anesthetic plan and risks discussed with patient. Use of blood products discussed with patient whom. Plan discussed with CRNA.                   Jacqualin Mortimer, MD   9/11/2020

## 2020-09-11 NOTE — ANESTHESIA POSTPROCEDURE EVALUATION
Department of Anesthesiology  Postprocedure Note    Patient: Poornima Craig  MRN: [id number]  YOB: 1960  Date of evaluation: 9/11/2020  Time:  11:06 AM     Procedure Summary     Date:  09/11/20 Room / Location:  [address]    Anesthesia Start:  0732 Anesthesia Stop:  1024    Procedure:  LEFT TYMPANOPLASTY (Left Ear) Diagnosis:       (H92.2  OTAGLIA OF LEFT EAR)      (HH72.02  PERFORATION OF TYMPANIC MEMBRANE OF LEFT EAR)    Surgeon:  Alana Michaud MD Responsible Provider:  Bella Adams MD    Anesthesia Type:  general ASA Status:  3          Anesthesia Type: general    Yaz Phase I: Yaz Score: 10    Yaz Phase II: Yaz Score: 10    Last vitals: Reviewed and per EMR flowsheets.        Anesthesia Post Evaluation    Patient location during evaluation: PACU  Patient participation: complete - patient participated  Level of consciousness: awake  Airway patency: patent  Nausea & Vomiting: no nausea and no vomiting  Complications: no  Cardiovascular status: hemodynamically stable  Respiratory status: acceptable  Hydration status: stable

## 2020-09-11 NOTE — PROGRESS NOTES
Patient education given and the patient expresses understanding and acceptance of instructions. Lisseth Baca 9/11/2020 12:34 PM  Discharge instructions reviewed with patient/responsible adult. All home medications have been reviewed, questions answered and patient verbalized understanding. Discharge instructions signed and copies given. Pt d/c'd per w/c. Vss. Pt stable. Accomp by sister.

## 2020-09-11 NOTE — H&P
Date of Surgery Update:  Zak Caruso was seen, history and physical examination reviewed, and patient examined by me today. There have been no significant clinical changes since the completion of the previous history and physical.    The risk, benefits, and alternatives of the proposed procedure have been explained to the patient (or appropriate guardian) and understanding verbalized. All questions answered. Patient wishes to proceed.     Electronically signed by: Benita Brown MD,9/11/2020,7:21 AM [Stable] : stable [CAD] : no coronary artery disease [Diabetes Mellitus] : no diabetes mellitus [PVD] : no peripheral vascular disease [Carotid Disease] : no carotid disease [AAA] : no abdominal aortic aneurysm [Hypertension] : no hypertension [None] : The patient is currently asymptomatic [Compliant] : the patient is compliant with ~his/her~ medication regimen [Side Effects] : ~He/She~ denies medication side effects [Lipid Panel] : a lipid panel

## 2020-09-11 NOTE — BRIEF OP NOTE
Brief Postoperative Note      Patient: Julianne Coronado  YOB: 1960  MRN: [id number]    Date of Procedure: 9/11/2020    Pre-Op Diagnosis:    (1) Marginal perforation of left tympanic membrane  (2) Conductiove hearing loss, left ear. Post-Op Diagnosis: Same       Procedure(s):  LEFT TYMPANOPLASTY    Surgeon(s):  Cindy Shepard MD    Assistant:  * No surgical staff found *    Anesthesia: General    Estimated Blood Loss (mL): less than 50     Complications: None    Specimens:   ID Type Source Tests Collected by Time Destination   A :  Tissue Tissue SURGICAL PATHOLOGY Cindy Shepard MD 9/11/2020 7283        Implants:  * No implants in log *      Drains: * No LDAs found *    Findings:   Large 40% marginal perforation of the posterior half of the left tympanic membrane overlying the incudostapedial joint, oval window, and round window. Intact ossicular chain with a positive round window reflex on manipulation of the I-S joint and of the manubrium. No evidence of cholesteatoma. Normal chorda tympani nerve, preserved.       Electronically signed by Cindy Shepard MD on 9/11/2020 at 10:14 AM

## 2020-09-11 NOTE — PROGRESS NOTES
Pt received into bay 5 from OR. Pt drowsy, yet oriented. Report obtained. Left ear dressing clean/dry/intact. Behind ear clean/intact. Denies any pain. Will monitor.

## 2020-09-17 ENCOUNTER — OFFICE VISIT (OUTPATIENT)
Dept: ENT CLINIC | Age: 60
End: 2020-09-17

## 2020-09-17 VITALS — BODY MASS INDEX: 29.82 KG/M2 | WEIGHT: 226 LBS

## 2020-09-17 PROCEDURE — 99024 POSTOP FOLLOW-UP VISIT: CPT | Performed by: OTOLARYNGOLOGY

## 2020-09-17 NOTE — PROGRESS NOTES
POST-OP NOTE  Chief Complaint   Patient presents with    Post-Op Check     left ear, has been painful        HISTORY:     Post left type I tympanoplasty on 9/11/2020. She stated that she is experiencing pain but pain medication is controlling the pain. She denied any other problems. She has no dizziness. EXAMINATION:        There were no vitals filed for this visit. WDWN, awake and alert, with no evidence of distress. Ears: Polysporin ointment was aspirated from the left external auditory meatus with a Ferrer suction. Gelfoam remains in place. Ciprodex otic suspension was instilled followed by cottonball meatal plug. ASSESSMENT:     Doing well post op, with no evidence of complication or problem. PLAN:    1. [place] was advised to follow the post-op instructions in the after visit summary given after surgery. 2. Bob was advised to continue post-op medications as prescribed. Orders Placed This Encounter   Medications    ciprofloxacin-dexamethasone (CIPRODEX) 0.3-0.1 % otic suspension     Sig: Place 4 drops into the left ear 3 times daily for 25 days     Dispense:  1 Bottle     Refill:  1       Follow-up  Return in about 12 days (around 9/29/2020) for recheck/follow-up, and sooner if condition worsens.

## 2020-09-29 NOTE — OP NOTE
[facility] [address]                     350 Located within [facility], [address]                                OPERATIVE REPORT    PATIENT NAME: Freddy Zhou                   :        1960  MED REC NO:   [id number]                          ROOM:  ACCOUNT NO:   [de-identified]                           ADMIT DATE: 2020  PROVIDER:     Viktoriya Castro MD    DATE OF PROCEDURE:  2020    LOCATION:  [address] [place] [address] Dr:  1.  Marginal perforation of left tympanic membrane. 2.  Conductive hearing loss, left ear. 3.  Chronic suppurative otitis media, left ear. POSTOPERATIVE DIAGNOSES:  1.  Marginal perforation of left tympanic membrane. 2.  Conductive hearing loss, left ear. 3.  Chronic suppurative otitis media, left ear. OPERATION PERFORMED:  Left type 1 tympanoplasty with temporalis fascia  underlay graft. SURGEON:  Sherrill Melissa. MD Shanel    ANESTHESIA:  General.    INDICATIONS FOR OPERATION:  This 69-year-old white female presented  today for left tympanoplasty to repair the left tympanic membrane  perforation associated with conductive hearing loss. She was counseled  for the procedure, the surgical risks and potential complications, etc.,  as per the informed consent document, which she read, we discussed, and  she signed previously in the office. INTRAOPERATIVE FINDINGS:  There was a large 40% marginal perforation of  the posterior half of the left tympanic membrane overlying the  incudostapedial joint, oval window, and round window. The ossicular  chain was intact with a positive round window reflex on manipulation of  the IS joint and of the manubrium. There was no evidence of  cholesteatoma. Middle ear mucosa appeared to be normal.  The chorda  tympani nerve appeared to be normal and was preserved.     DESCRIPTION OF OPERATION:  The patient was taken to the operating room  and placed in the supine position. Adequate and uncomplicated general  anesthesia was induced and maintained by attending anesthetist using an  oroendotracheal tube. A time-out was held and the patient was  identified and the procedure, site and side of procedure confirmed. The  external canal and postauricular area were infiltrated with 1% lidocaine  with 1:100,000 epinephrine after an initial prep with 70% isopropyl  alcohol. The patient was then positioned and prepped and draped in a  sterile manner. The external auditory canal and tympanic membrane were then examined  through the operating microscope. The margin of the tympanic membrane  was then removed using the straight sharp Caldwell pick and cup forceps. Then, the tympanomeatal flap incision was made with a round knife and  spade knife. This canal skin was elevated down to the fibrous annulus,  which was elevated from the bony annulus. Posterior tympanotomy was  performed in the posterior inferior area. This was widened superiorly  and inferiorly to the 12 o'clock and 6 o'clock positions. The  tympanomeatal flap was then reflected anteriorly. Middle ear  exploration was then performed with the above findings. At this point,  two cotton balls moistened with 1:100,000 epinephrine were placed on the  posterior canal and the tympanomeatal flap. A temporalis fascia underlay graft was then harvested in the usual  manner through a superior postauricular incision. This incision was  approximated with a running locked suture of 4-0 chromic. The harvested  fascia was then tinted with methylene blue and placed into a graft press for 10 minutes and then the graft press was opened and the graft was allowed to air dry. Attention was now addressed to the ear again. The cotton balls were  removed and the cotton ball count confirmed correct.   The middle ear was  then filled with Gelfoam moistened with Ciprofloxacin ophthalmic solution, to  serve as a bed for the graft.  The air dried graft was now taken and cut  into appropriate size and shape. It was then slid over the Gelfoam and  under the manubrium into appropriate position. The tympanomeatal flap  was now reflected back into position and smoothed out. It was then  assured that the graft was under all margins of the perforation. Next,  Gelfoam pledgets moistened with Cipro ophthalmic solution were placed on the tympanic membrane,  tympanomeatal flap and the graft and brought out past the canal  incision. The remainder of the EAC was filled with Polysporin ointment. A  cotton ball meatal dressing was placed and secured with Band-Aid  dressing. Polysporin ointment was also placed on the postauricular  incision. The patient was then awakened and extubated and taken to the  recovery room in stable condition, having tolerated the procedure well  with no intraoperative complications and estimated blood loss of less  than 10 mL.         Kristine Fox MD    D: 09/28/2020 10:52:30       T: 09/28/2020 13:25:01     [email or web address]  Job#: [id number]     Doc#: [id number]    CC:

## 2020-10-05 ENCOUNTER — OFFICE VISIT (OUTPATIENT)
Dept: ENT CLINIC | Age: 60
End: 2020-10-05

## 2020-10-05 VITALS — BODY MASS INDEX: 29.16 KG/M2 | WEIGHT: 221 LBS

## 2020-10-05 PROCEDURE — 99024 POSTOP FOLLOW-UP VISIT: CPT | Performed by: OTOLARYNGOLOGY

## 2020-10-05 RX ORDER — CIPROFLOXACIN AND DEXAMETHASONE 3; 1 MG/ML; MG/ML
4 SUSPENSION/ DROPS AURICULAR (OTIC) 3 TIMES DAILY
Qty: 1 BOTTLE | Refills: 0 | Status: SHIPPED | OUTPATIENT
Start: 2020-10-05 | End: 2020-10-18

## 2020-10-05 NOTE — PROGRESS NOTES
POST-OP NOTE    Chief Complaint   Patient presents with    Post-Op Check     everything is going great          HISTORY:     Post left type I tympanoplasty on 9/11/2020. She stated she is doing well with no current problems. EXAMINATION:        There were no vitals filed for this visit. WDWN, awake and alert, with no evidence of distress. Ears:  Gelfoam partially removed from the left external auditory canal.      Tuning forks: Ritchie lateralized to the left ear (512 Hz). ASSESSMENT:     Doing well post op, with no evidence of complication or problem. PLAN:    1. Panda Pat was advised to follow the post-op instructions in the after visit summary given after surgery. 2. Panda Pat was advised to continue post-op medications as prescribed. Follow-up  Return in about 2 weeks (around 10/19/2020).

## 2020-10-21 RX ORDER — ROSUVASTATIN CALCIUM 10 MG/1
10 TABLET, COATED ORAL NIGHTLY
Qty: 90 TABLET | Refills: 3 | Status: SHIPPED | OUTPATIENT
Start: 2020-10-21 | End: 2021-12-09 | Stop reason: SDUPTHER

## 2020-10-22 ENCOUNTER — OFFICE VISIT (OUTPATIENT)
Dept: ENT CLINIC | Age: 60
End: 2020-10-22

## 2020-10-22 VITALS — BODY MASS INDEX: 29.29 KG/M2 | WEIGHT: 222 LBS

## 2020-10-22 PROCEDURE — 99024 POSTOP FOLLOW-UP VISIT: CPT | Performed by: OTOLARYNGOLOGY

## 2020-10-22 NOTE — PATIENT INSTRUCTIONS
See Dr. Gabriela Vela as soon as possible for a post operative hearing test.        WATER PRECAUTIONS  · Do not allow water to get into your left ear, as this may cause, or worsen, an ear infection. · Before bathing, showering or washing your hair, a plug of cotton coated with petroleum jelly should be placed in the opening of your ear canal.  After bathing the cotton should be removed and the outer part of your  ear dried with a soft towel. Alternatively, you may use a silicone putty ear plug purchased from your pharmacy. · Swimming may be permitted if you wear adequate ear plugs. · If water does enter your ear, drain the water out into a tissue or cotton ball. Then, instill into your ear four drops of the eardrops you were prescribed. Call the office if you develop pain or drainage.

## 2020-11-20 ENCOUNTER — TELEPHONE (OUTPATIENT)
Dept: ENT CLINIC | Age: 60
End: 2020-11-20

## 2020-11-20 NOTE — TELEPHONE ENCOUNTER
Pt needs to see Dr. Pipe Verdin first.  In regards to COVID, Pt needs to quarantine for 2 weeks or test negative prior to any visit

## 2020-11-20 NOTE — TELEPHONE ENCOUNTER
Patient calling she was scheduled for an appt to see Dr Ann Bowman Next wed, she was exposed to Covid    She got tested wed, and waiting for her results    She was suppose to get a HT with dr Inna Ayala first before she came here to see Dr Ann Bowman    She has not done that yet, because she had a stomach virus    She will call back to schedule when she can   Please advise

## 2020-12-28 NOTE — TELEPHONE ENCOUNTER
Spoke to  Patient, she has been very busy , taking care of her Mother, and Alysia Vasquez, she will call Dr Lois Thakur and schedule, and then give us a call

## 2021-01-11 ENCOUNTER — OFFICE VISIT (OUTPATIENT)
Dept: ENT CLINIC | Age: 61
End: 2021-01-11
Payer: COMMERCIAL

## 2021-01-11 VITALS
HEIGHT: 72 IN | SYSTOLIC BLOOD PRESSURE: 132 MMHG | BODY MASS INDEX: 30.34 KG/M2 | WEIGHT: 224 LBS | DIASTOLIC BLOOD PRESSURE: 87 MMHG | TEMPERATURE: 96.4 F | RESPIRATION RATE: 16 BRPM | HEART RATE: 93 BPM

## 2021-01-11 DIAGNOSIS — Z86.69: ICD-10-CM

## 2021-01-11 DIAGNOSIS — H72.02 CENTRAL PERFORATION OF TYMPANIC MEMBRANE OF LEFT EAR: ICD-10-CM

## 2021-01-11 DIAGNOSIS — H90.A31 MIXED CONDUCTIVE AND SENSORINEURAL HEARING LOSS OF RIGHT EAR WITH RESTRICTED HEARING OF LEFT EAR: ICD-10-CM

## 2021-01-11 DIAGNOSIS — H72.2X1 MARGINAL PERFORATION OF TYMPANIC MEMBRANE OF RIGHT EAR: Primary | ICD-10-CM

## 2021-01-11 DIAGNOSIS — H90.5 HIGH FREQUENCY SENSORINEURAL HEARING LOSS OF LEFT EAR: ICD-10-CM

## 2021-01-11 PROCEDURE — 99214 OFFICE O/P EST MOD 30 MIN: CPT | Performed by: OTOLARYNGOLOGY

## 2021-01-11 ASSESSMENT — ENCOUNTER SYMPTOMS
VOICE CHANGE: 0
SORE THROAT: 0
RHINORRHEA: 0

## 2021-01-11 NOTE — PATIENT INSTRUCTIONS
WATER PRECAUTIONS  · Do not allow water to get into your right and left ears, as this may cause, or worsen, an ear infection. · Before bathing, showering or washing your hair, a plug of cotton coated with petroleum jelly should be placed in the opening of your ear canal.  After bathing the cotton should be removed and the outer part of your ear dried with a soft towel. Alternatively, you may use a silicone putty ear plug purchased from your pharmacy. · If water does enter your ear, drain the water out into a tissue or cotton ball. Then, instill into your ear four drops of the eardrops you were prescribed. Call the office if you develop pain or drainage.

## 2021-01-11 NOTE — PROGRESS NOTES
[facility]         PCP:  Shane Chavez MD      CHIEF COMPLAINT:  Chief Complaint   Patient presents with    Ear Problem     right TM perforation and recheck after left tympanoplasty. HISTORY OF PRESENT ILLNESS:       Indiana Dee is a 63 y.o. female here for recheck right and left ears. She stated that she is hearing much better in her left ear. She now wants to proceed with surgery on her right ear. Recently had amoxicillin for 10 days and then Augmentin for 10 days for a mouth infection. Finished last Friday the 8th. She is S/P left tympanoplasty on September 11, 2020. She is having no problems with her left ear after the  surgery       REVIEW OF SYSTEMS:    Review of Systems   Constitutional: Negative for chills and fever. HENT: Negative for ear discharge, ear pain, rhinorrhea, sinus pain, sore throat and voice change.         PAST MEDICAL HISTORY:    Past Medical History:   Diagnosis Date    Bradycardia     Bronchitis     Cancer ([address] 2009    BCC Rt leg,squamous cell,LEFT LEG,FACE-BASAL CELL    Classic migraine     Degenerated intervertebral disc     GERD (gastroesophageal reflux disease)     Hx of blood clots     neck,arm after first pacemaker inserted    Hypercholesterolemia     IBS (irritable bowel syndrome)     Nausea & vomiting     Neuropathy     Osteoporosis     Patent foramen ovale     surgery as child    Peripheral vascular disease ([address]     PONV (postoperative nausea and vomiting)     family hx also of post op n/v    Pulmonary embolism (HCC)     Rheumatoid arthritis of the hand     left thumb MCP joint , left great toe MTP joint    Seizure ([address]     as a child due to hole in heart-last one [address] sinus syndrome ([address]     s/p pacemaker 2008    Sleep apnea     NO C-PAP    Thyroid nodule     benign s/p surgery-GOITER-REMOVED         Past Surgical History: Procedure Laterality Date    CARDIAC SURGERY  01/1965    Congenital heart defect    COLONOSCOPY      SEVERAL    ESOPHAGEAL DILATATION N/A 6/20/2019    ESOPHAGEAL DILATION VALLECILLO performed by Sveta Santoro MD at [address] [place] [address] ARTHROSCOPY Right     torn meniscous    KNEE SURGERY Left 2010   [address]   [address] [facility] & 2007    Placement under stomach muscle    PACEMAKER PLACEMENT  2016    THYROIDECTOMY, PARTIAL Right 2011    TONSILLECTOMY AND ADENOIDECTOMY  1978    TYMPANOPLASTY Left 9/11/2020    LEFT TYMPANOPLASTY performed by Sia Ashley MD at [address] [facility] ENDOSCOPY  04/17/2017    UPPER GASTROINTESTINAL ENDOSCOPY  07/06/2017    UPPER GASTROINTESTINAL ENDOSCOPY N/A 6/20/2019    EGD BIOPSY performed by Sveta Santoro MD at [facility]:      Vitals:    01/11/21 0850   BP: 132/87   Site: Right Upper Arm   Position: Sitting   Cuff Size: Large Adult   Pulse: 93   Resp: 16   Temp: 96.4 °F (35.8 °C)   TempSrc: Temporal   Weight: 224 lb (101.6 kg)   Height: 6' 1\" (1.854 m)         Physical Exam  Vitals signs reviewed. Constitutional:       Appearance: Normal appearance. HENT:      Head: Normocephalic and atraumatic. Right Ear: Ear canal and external ear normal. No drainage. No middle ear effusion. There is no impacted cerumen. No mastoid tenderness. Tympanic membrane is perforated and bulging. Tympanic membrane is not erythematous or retracted. Left Ear: Ear canal and external ear normal. Tympanic membrane is retracted (mildly retracted). Ears:      Comments: Right marginal perforation. Healed perforation left TM with mild posterior retraction, post operative surgical changes. Nose: Nose normal.      Mouth/Throat:      Mouth: Mucous membranes are moist.      Pharynx: Oropharynx is clear. No oropharyngeal exudate or posterior oropharyngeal erythema. Comments: Post tonsilllectomy  Neck:      Musculoskeletal: Normal range of motion and neck supple. No neck rigidity or muscular tenderness. Lymphadenopathy:      Cervical: No cervical adenopathy. Neurological:      Mental Status: She is alert.                                        COUNSELING FOR RIGHT TYMPANOPLASTY: Yasmin Ward was advised of the recommended surgery/procedure of right tympanoplasty with possible ossicular reconstruction. Yasmin Ward stated that he wants to proceed with surgery. Yasmin Ward chose general anesthesia over local anesthesia with IV sedation. Yasmin Ward stated understanding that this surgery will be done under general anesthesia. The surgical procedure was described. I discussed the incisional scar and possible resultant deformity. I discussed the surgical technique and the usual post operative course. I discussed the possibility of persistent or recurrent tympanic membrane perforation and/or hearing loss. I advised there is no guarantee of cure, success, or of final results. I discussed the alternatives of therapy, expected outcome and potential benefits, and the attendant risks and potential complications, including, but not limited to, failure of the eardrum to heal in the perforation to close with resultant permanent perforation, persistent hearing loss, cholesteatoma, excessive intraoperative or postoperative bleeding, hemorrhage, infection, facial paralysis, injury to surrounding structures or organs, injury to major blood vessels or nerves, excessive scarring, deformity, poor (incomplete or lack of) wound healing, pain, discomfort, loss of taste on the anterior two thirds of tongue, persistent hearing loss, total loss of hearing, worsening of hearing, numbness of ear and surrounding skin, need for further surgery, and risks of anesthesia, including heart attack, cardiac arrest, stroke, blood clots in lower extremity veins, pulmonary embolism, and death, and other risks listed on the informed consent document, which we discussed together. All Haley's questions were answered. Yasmin Ward then stated and confirmed understanding and acceptance of the preceding, the lack of further questions, and the desire to proceed with surgery. Then, Yasmin Ward read and signed the informed consent document. Es Celaya / Bre Hale / Jon Neighbours:       William Arias was seen today for ear problem. 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 Right tympanoplasty, OR, general anesth 2 hours  2. Return for post op check. Patient Instructions     WATER PRECAUTIONS  · Do not allow water to get into your right and left ears, as this may cause, or worsen, an ear infection. · Before bathing, showering or washing your hair, a plug of cotton coated with petroleum jelly should be placed in the opening of your ear canal.  After bathing the cotton should be removed and the outer part of your ear dried with a soft towel. Alternatively, you may use a silicone putty ear plug purchased from your pharmacy. · If water does enter your ear, drain the water out into a tissue or cotton ball. Then, instill into your ear four drops of the eardrops you were prescribed. Call the office if you develop pain or drainage. MEDICAL DECISION MAKING    # and complexity of problems addressed:  87127 - Low  1 stable chronic illness    Amount and/or Complexity of Data to be Reviewed and Analyzed  29372 - Straightforward  1 test or document reviewed or ordered    Risk of Complications and /or Morbidity or Mortality of Patient Management  18004 - Moderate  Decision regarding elective major surgery without identified patient or procedure risk factors.

## 2021-01-13 ENCOUNTER — TELEPHONE (OUTPATIENT)
Dept: ENT CLINIC | Age: 61
End: 2021-01-13

## 2021-01-13 NOTE — TELEPHONE ENCOUNTER
[phone] ([place])     Left VM for patient to call back to schedule surgery that was discussed 01/11/2021

## 2021-01-18 ASSESSMENT — ENCOUNTER SYMPTOMS: SINUS PAIN: 0

## 2021-02-09 ENCOUNTER — TELEPHONE (OUTPATIENT)
Dept: ENT CLINIC | Age: 61
End: 2021-02-09

## 2021-02-18 NOTE — TELEPHONE ENCOUNTER
Medication:   Requested Prescriptions      No prescriptions requested or ordered in this encounter        Last Filled:      Patient Phone Number: [phone] (home)     Last appt: 8/31/2020   Next appt: 2/26/2021    Last OARRS: No flowsheet data found.

## 2021-02-19 RX ORDER — BUDESONIDE AND FORMOTEROL FUMARATE DIHYDRATE 160; 4.5 UG/1; UG/1
2 AEROSOL RESPIRATORY (INHALATION) 2 TIMES DAILY
Qty: 10.2 G | Refills: 5 | Status: SHIPPED | OUTPATIENT
Start: 2021-02-19

## 2021-03-01 ENCOUNTER — OFFICE VISIT (OUTPATIENT)
Dept: PRIMARY CARE CLINIC | Age: 61
End: 2021-03-01
Payer: COMMERCIAL

## 2021-03-01 VITALS
SYSTOLIC BLOOD PRESSURE: 136 MMHG | WEIGHT: 223 LBS | RESPIRATION RATE: 16 BRPM | BODY MASS INDEX: 30.2 KG/M2 | HEIGHT: 72 IN | OXYGEN SATURATION: 98 % | TEMPERATURE: 97.1 F | DIASTOLIC BLOOD PRESSURE: 89 MMHG | HEART RATE: 78 BPM

## 2021-03-01 DIAGNOSIS — Z01.818 PRE-OP EVALUATION: Primary | ICD-10-CM

## 2021-03-01 DIAGNOSIS — Z20.828 EXPOSURE TO SARS-ASSOCIATED CORONAVIRUS: Primary | ICD-10-CM

## 2021-03-01 LAB — SARS-COV-2: NOT DETECTED

## 2021-03-01 PROCEDURE — 99213 OFFICE O/P EST LOW 20 MIN: CPT | Performed by: INTERNAL MEDICINE

## 2021-03-01 PROCEDURE — 99211 OFF/OP EST MAY X REQ PHY/QHP: CPT | Performed by: NURSE PRACTITIONER

## 2021-03-01 NOTE — PROGRESS NOTES
3/1/2021    Indiana Dee (:  1960) is a 63 y.o. 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 Patient has intolerances/allergies to tape (blister) Keflex (vomiting) and morphine (hyper activating) and anesthesia (PONV). They are an easy bleeder in virtue of Eliquis. They have not been on chronic cortisone. They have no history of diabetes. There is no family history of malignant hyperthermia. Patient has tolerated general anesthesia for contralateral tympanic membrane repair 6 months ago. Patient has no known history of hepatitis. They have never received a blood transfusion. They have a history of sleep apnea, AHI on CPAP still 32, trialed on BiPAP but still intolerant. Not used BiPAP or CPAP for few years. The patient denies recent fevers, shaking chills, drenching sweats. The patient denies new headache, ear or sinus pressure/pain/drainage, sore throat, dental thermal sensitivity, productive cough, abdominal pain, diarrhea, dysuria, new dyspnea, new chest pain, new pleuritic chest pain. The patient also denies new leg swelling, joint warmth/redness, and open skin sores. Patient is sedentary. She does not think she can climb 2 flights of stairs, but kind 1 flight of stairs without chest pain, only mild dyspnea. She denies PND orthopnea claudication TIA stroke symptoms. She does not smoke. She does not wheeze even with (limited) exertion. Last echo in 2019 showed atrial shunt, LVEF 50 to 55%. Review of Systems   Constitutional: Negative for activity change, appetite change, fatigue and unexpected weight change. HENT: Negative for dental problem, sinus pain, sore throat and trouble swallowing. Eyes: Negative for pain and visual disturbance. Respiratory: Negative for apnea, cough, chest tightness, shortness of breath and wheezing. Cardiovascular: Negative for chest pain and palpitations. Gastrointestinal: Negative for abdominal pain, blood in stool, constipation, diarrhea, nausea, rectal pain and vomiting. Endocrine: Negative for cold intolerance, heat intolerance, polydipsia, polyphagia and polyuria. Genitourinary: Negative for difficulty urinating, dysuria, flank pain, frequency, hematuria, pelvic pain, urgency, vaginal bleeding and vaginal discharge. Musculoskeletal: Negative for arthralgias, back pain, gait problem, joint swelling, myalgias, neck pain and neck stiffness. Skin: Negative for color change and rash. Neurological: Negative for dizziness, tremors, syncope, speech difficulty, weakness, light-headedness and headaches. Hematological: Negative for adenopathy. Does not bruise/bleed easily. Psychiatric/Behavioral: Negative for agitation, behavioral problems, decreased concentration, sleep disturbance and suicidal ideas. The patient is not nervous/anxious and is not hyperactive. Prior to Visit Medications    Medication Sig Taking?  Authorizing Provider   budesonide-formoterol (SYMBICORT) 160-4.5 MCG/ACT AERO Inhale 2 puffs into the lungs 2 times daily Yes Laura Thayer MD   rosuvastatin (CRESTOR) 10 MG tablet Take 1 tablet by mouth nightly Yes General Bennett Franci Renner MD   promethazine (PHENERGAN) 25 MG tablet Take 1 tablet by mouth every 6 hours as needed for Nausea Yes Brett Austin MD   hydroCHLOROthiazide (HYDRODIURIL) 25 MG tablet Take 1 tablet by mouth every morning Yes Freya Cabral MD   DULoxetine (CYMBALTA) 60 MG extended release capsule TAKE ONE CAPSULE BY MOUTH DAILY Yes Freya Cabral MD   vitamin D (ERGOCALCIFEROL) 1.25 MG (42848 UT) CAPS capsule TAKE ONE CAPSULE BY MOUTH ONCE WEEKLY Yes Freya Cabral MD   fluticasone (FLONASE) 50 MCG/ACT nasal spray 1 spray by Each Nostril route daily Yes Historical Provider, MD   albuterol sulfate (PROAIR RESPICLICK) 839 (90 Base) MCG/ACT aerosol powder inhalation Inhale 2 puffs into the lungs every 6 hours as needed for Wheezing or Shortness of Breath Yes Freya Cabral MD   potassium chloride (KLOR-CON M) 20 MEQ extended release tablet Take 1 tablet by mouth daily Yes Freya Cabral MD   apixaban (ELIQUIS) 5 MG TABS tablet Take 2 tabs po BID for 6 days then 1 tab BID  Patient taking differently: 2.5 mg 2 times daily Take 2 tabs po BID for 6 days then 1 tab BID Yes Sho Mcpherson MD   omeprazole (PRILOSEC) 20 MG delayed release capsule Take 20 mg by mouth daily  Yes Historical Provider, MD   b complex vitamins capsule Take 1 capsule by mouth daily Yes Historical Provider, MD        Allergies   Allergen Reactions    Keflex [Cephalexin] Nausea And Vomiting     vomiting    Adhesive Tape Other (See Comments)     Red & painful-blisters-severe    Morphine Other (See Comments)     Hyper.  Pt refuse       Past Medical History:   Diagnosis Date    Bradycardia     Bronchitis     Cancer ([facility] 2009    BCC Rt leg,squamous cell,LEFT LEG,FACE-BASAL CELL    Classic migraine     Degenerated intervertebral disc     GERD (gastroesophageal reflux disease)     Hx of blood clots     neck,arm after first pacemaker inserted    Hypercholesterolemia     IBS (irritable bowel syndrome)  Nausea & vomiting     Neuropathy     Osteoporosis     Patent foramen ovale     surgery as child    Peripheral vascular disease ([address]     PONV (postoperative nausea and vomiting)     family hx also of post op n/v    Pulmonary embolism ([address]     Rheumatoid arthritis of the hand     left thumb MCP joint , left great toe MTP joint    Seizure ([address]     as a child due to hole in heart-last one [address] sinus syndrome ([address]     s/p pacemaker 2008    Sleep apnea     NO C-PAP    Thyroid nodule     benign s/p surgery-GOITER-REMOVED       Past Surgical History:   Procedure Laterality Date    CARDIAC SURGERY  01/1965    Congenital heart defect    COLONOSCOPY      SEVERAL    ESOPHAGEAL DILATATION N/A 6/20/2019    ESOPHAGEAL DILATION VALLECILLO performed by Jakub Vázquez MD at [facility] Right     torn meniscous    KNEE SURGERY Left 2010   [address]   [facility] [address] 2007    Placement under stomach muscle    PACEMAKER PLACEMENT  2016    THYROIDECTOMY, PARTIAL Right 2011    TONSILLECTOMY AND ADENOIDECTOMY  1978    TYMPANOPLASTY Left 9/11/2020    LEFT TYMPANOPLASTY performed by Miguel Angel Mandujano MD at [facility] ENDOSCOPY  04/17/2017    UPPER GASTROINTESTINAL ENDOSCOPY  07/06/2017    UPPER GASTROINTESTINAL ENDOSCOPY N/A 6/20/2019    EGD BIOPSY performed by Jakub Vázquez MD at [address] [place] [address] History     Socioeconomic History    Marital status: Single     Spouse name: Not on file    Number of children: Not on file    Years of education: Not on file    Highest education level: Not on file   Occupational History    Not on file   Social Needs    Financial resource strain: Not on file    Food insecurity     Worry: Not on file     Inability: Not on file    Transportation needs     Medical: Not on file     Non-medical: Not on file   Tobacco Use    Smoking status: Never Smoker    Smokeless tobacco: Never Used   Substance and Sexual Activity    Alcohol use: Yes     Comment: Ocassional-rare    Drug use: No    Sexual activity: Not Currently     Partners: Male   Lifestyle    Physical activity     Days per week: Not on file     Minutes per session: Not on file    Stress: Not on file   Relationships    Social connections     Talks on phone: Not on file     Gets together: Not on file     Attends Restorationism service: Not on file     Active member of club or organization: Not on file     Attends meetings of clubs or organizations: Not on file     Relationship status: Not on file    Intimate partner violence     Fear of current or ex partner: Not on file     Emotionally abused: Not on file     Physically abused: Not on file     Forced sexual activity: Not on file   Other Topics Concern    Not on file   Social History Narrative    Not on file        Family History   Problem Relation Age of Onset    Rheum Arthritis Mother     Hypertension Mother     Osteoporosis Mother     Kidney Disease Mother     Cancer Father         lung    Diabetes Father     COPD Father     High Blood Pressure Sister     Migraines Sister     Osteoporosis Maternal Grandmother     Cancer Maternal Aunt     Cancer Paternal Aunt     Heart Attack Maternal Grandfather     Heart Disease Maternal Grandfather        Vitals:    03/01/21 1012   BP: 136/89   Pulse: 78   Resp: 16   Temp: 97.1 °F (36.2 °C)   TempSrc: Oral   SpO2: 98%   Weight: 223 lb (101.2 kg)   Height: 6' 1\" (1.854 m)     Estimated body mass index is 29.42 kg/m² as calculated from the following:    Height as of this encounter: 6' 1\" (1.854 m). Weight as of this encounter: 223 lb (101.2 kg). PHYSICAL EXAM  GENERAL:  Pleasant  female who looks her stated age, awake alert and oriented x3, no acute distress. HEENT:  Normocephalic atraumatic. Pupils equal round reactive light and accommodation, extra ocular muscles are intact. Oropharynx is clear moist without injection or exudate. Tongue and palate move normally. Turbinates appear normal.  Tympanic membranes appear normal.  Mallampati 2. Poor dentition. NECK:  Supple nontender. No carotid bruits. Brisk carotid upstrokes, no JVD. No thyromegaly. Able to extend neck to 70 degrees above the horizontal plane. LYMPH:  No supraclavicular cervical axillary or inguinal lymphadenopathy. LUNGS:  Clear to auscultation bilaterally. Excellent air entry. No inspiratory crackles or expiratory wheezes. HEART:  Regular rate and rhythm without pathologic murmur rub gallop S3 or S4. ABDOMEN:  Soft, nontender. Normal bowel sounds. No guarding. No masses. UROGENITAL:  Deferred  EXTREMITIES:  Warm and well perfused without clubbing cyanosis or edema. 2+ pulses in all 4 extremities. Capillary refill less than 2 seconds. NEURO:  Cranial nerves 2-12 grossly intact. Normal muscle bulk and tone. No resting tremor, cogwheeling, normal rapid alternating movements in the hands and feet. No stocking paresthesia. Normal gait and station. MUSCULOSKELETAL:  Mild osteoarthritic changes. Bilateral genu valgus deformity. SKIN:  No worrisome lesions, skin a little dry. PSYCH:  No psychomotor retardation or agitation. Good eye contact. Unrestricted affect range. Mood congruent with affect. Linear thought.     LABS  Lab Review   Admission on 09/11/2020, Discharged on 09/11/2020   Component Date Value    POC Glucose 09/11/2020 106*    Performed on 09/11/2020 ACCU-CHEK    Office Visit on 09/07/2020   Component Date Value    SARS-CoV-2, COMPA 09/07/2020 NOT DETECTED    Orders Only on 09/04/2020   Component Date Value    Hemoglobin A1C 09/04/2020 5.6     eAG 09/04/2020 114.0     WBC 09/04/2020 5.9     RBC 09/04/2020 4.28     Hemoglobin 09/04/2020 12.6     Hematocrit 09/04/2020 38.9     MCV 09/04/2020 91.1     MCH 09/04/2020 29.5     MCHC 09/04/2020 32.4     RDW 09/04/2020 14.9     Platelets 09/04/2020 289     MPV 09/04/2020 8.6     Neutrophils % 09/04/2020 50.4     Lymphocytes % 09/04/2020 40.7     Monocytes % 09/04/2020 5.3     Eosinophils % 09/04/2020 2.9     Basophils % 09/04/2020 0.7     Neutrophils Absolute 09/04/2020 3.0     Lymphocytes Absolute 09/04/2020 2.4     Monocytes Absolute 09/04/2020 0.3     Eosinophils Absolute 09/04/2020 0.2     Basophils Absolute 09/04/2020 0.0     Sodium 09/04/2020 141     Potassium 09/04/2020 5.0     Chloride 09/04/2020 104     CO2 09/04/2020 26     Anion Gap 09/04/2020 11     Glucose 09/04/2020 85     BUN 09/04/2020 14     CREATININE 09/04/2020 0.9     GFR Non- 09/04/2020 >60     GFR  09/04/2020 >60     Calcium 09/04/2020 9.6     Total Protein 09/04/2020 6.6     Albumin 09/04/2020 4.1     Albumin/Globulin Ratio 09/04/2020 1.6     Total Bilirubin 09/04/2020 0.4     Alkaline Phosphatase 09/04/2020 117     ALT 09/04/2020 17     AST 09/04/2020 22     Globulin 09/04/2020 2.5     Color, UA 09/04/2020 YELLOW     Clarity, UA 09/04/2020 CLOUDY*    Glucose, Ur 09/04/2020 Negative     Bilirubin Urine 09/04/2020 Negative     Ketones, Urine 09/04/2020 Negative     Specific Gravity, UA 09/04/2020 1.018     Blood, Urine 09/04/2020 Negative     pH, UA 09/04/2020 6.0     Protein, UA 09/04/2020 Negative     Urobilinogen, Urine 09/04/2020 0.2     Nitrite, Urine 09/04/2020 Negative     Leukocyte Esterase, Urine 09/04/2020 Negative     Microscopic Examination 09/04/2020 YES     Urine Type 09/04/2020 Cleancatch     Microalbumin, Random Uri* 09/04/2020 1.50     Creatinine, Ur 09/04/2020 81.9     Microalbumin Creatinine * 09/04/2020 18.3     Hyaline Casts, UA 09/04/2020 1     WBC, UA 09/04/2020 2     RBC, UA 09/04/2020 1     Epithelial Cells, UA 09/04/2020 1    Office Visit on 05/22/2020   Component Date Value    SARS-CoV-2 05/22/2020 Not Detected     Source 05/22/2020 OP swab    Admission on 04/08/2020, Discharged on 04/08/2020   Component Date Value    Ventricular Rate 04/08/2020 159     Atrial Rate 04/08/2020 81     QRS Duration 04/08/2020 68     Q-T Interval 04/08/2020 136     QTc Calculation (Bazett) 04/08/2020 221     R Axis 04/08/2020 18     T Axis 04/08/2020 183     Diagnosis 04/08/2020 Atrial pacingConfirmed by Ho CRYSTAL MD [phone]) on 4/9/2020 9:05:55 AM     WBC 04/08/2020 6.6     RBC 04/08/2020 4.18     Hemoglobin 04/08/2020 12.8     Hematocrit 04/08/2020 38.4     MCV 04/08/2020 92.0     MCH 04/08/2020 30.6     MCHC 04/08/2020 33.3     RDW 04/08/2020 14.7     Platelets 89/64/3756 264     MPV 04/08/2020 8.4     Neutrophils % 04/08/2020 59.6     Lymphocytes % 04/08/2020 32.3     Monocytes % 04/08/2020 4.3     Eosinophils % 04/08/2020 2.9     Basophils % 04/08/2020 0.9     Neutrophils Absolute 04/08/2020 3.9     Lymphocytes Absolute 04/08/2020 2.1     Monocytes Absolute 04/08/2020 0.3     Eosinophils Absolute 04/08/2020 0.2     Basophils Absolute 04/08/2020 0.1     Sodium 04/08/2020 139     Potassium reflex Magnesi* 04/08/2020 4.0     Chloride 04/08/2020 102     CO2 04/08/2020 24     Anion Gap 04/08/2020 13     Glucose 04/08/2020 106*    BUN 04/08/2020 17     CREATININE 04/08/2020 0.7     GFR Non- 04/08/2020 >60     GFR  04/08/2020 >60     Calcium 04/08/2020 9.1     Pro-BNP 04/08/2020 69     Troponin 04/08/2020 <0.01          ASSESSMENT/PLAN  1. Pre-op evaluation  Patient has no signs or symptoms of active infection at this time. She is up-to-date with Tdap (2016). She has no signs or symptoms of decompensated cardiopulmonary disease. She has fair functional status, and has tolerated this procedure on the contralateral side 6 months ago. CBC BMP has been ordered by the surgeon.   I would simply caution anesthesiologist to exercise care given her dentition, and utilize BiPAP as CPAP was ineffective previously for any postoperative apnea; and of course prophylaxis with promethazine for PONV (given drug interaction with Eliquis though patient off Eliquis for procedure); patient will hold Eliquis for 3 days prior to procedure, resume likely 1 to 2 days postop depending on surgeon preference/bleed risk. I have asked her to take her omeprazole only on the morning of procedure with a small sip of water at least 90 minutes before scheduled report time. Return in about 6 months (around 9/1/2021). It was a pleasure to visit with MsLul Saint Clair today. Answered all questions as best I could.   Klaus Gilliland MD   Time 25 minutes

## 2021-03-01 NOTE — PATIENT INSTRUCTIONS
1. Eliquis held 3 days before surgery, resume 1-2 days post op  2. Hold all oral meds on day of surgery, EXCEPT omeprazole. Take omeprazole small sip of water day of surgery, 90 min before scheduled report time  3.  Due for labs in six months

## 2021-03-01 NOTE — PROGRESS NOTES
Name_______________________________________Printed:____________________  Date and time of surgery_______3/5/21 0945_________________Arrival Time:______[facility]__   1. The instructions given regarding when and if a patient needs to stop oral intake prior to surgery varies. Follow the specific instructions you were given                  ___Nothing to eat or to drink after Midnight the night before.                   ____Carbo loading or ERAS instructions will be given to select patients-if you have been given those instructions -please do the following                           The evening before your surgery after dinner before midnight drink 40 ounces of gatorade. If you are diabetic use sugar free. The morning of surgery drink 40 ounces of water. This needs to be finished 3 hours prior to your surgery start time. 2. Take the following pills with a small sip of water on the morning of surgery_____________inhalers______________________________________                  Do not take blood pressure medications ending in pril or sartan the stef prior to surgery or the morning of surgery_   3. Aspirin, Ibuprofen, Advil, Naproxen, Vitamin E and other Anti-inflammatory products and supplements should be stopped for 5 -7days before surgery or as directed by your physician. 4. Check with your Doctor regarding stopping Plavix, Coumadin,Eliquis, Lovenox,Effient,Pradaxa,Xarelto, Fragmin or other blood thinners and follow their instructions. 5. Do not smoke, and do not drink any alcoholic beverages 24 hours prior to surgery. This includes NA Beer. Refrain from the usage of any recreational drugs. 6. You may brush your teeth and gargle the morning of surgery. DO NOT SWALLOW WATER   7. You MUST make arrangements for a responsible adult to stay on site while you are here and take you home after your surgery. You will not be allowed to leave alone or drive yourself home.   It is strongly suggested someone stay with you the first 24 hrs. Your surgery will be cancelled if you do not have a ride home. 8. A parent/legal guardian must accompany a child scheduled for surgery and plan to stay at the hospital until the child is discharged. Please do not bring other children with you. 9. Please wear simple, loose fitting clothing to the hospital.  Miguel Ángel Pedlar not bring valuables (money, credit cards, checkbooks, etc.) Do not wear any makeup (including no eye makeup) or nail polish on your fingers or toes. 10. DO NOT wear any jewelry or piercings on day of surgery. All body piercing jewelry must be removed. 11. If you have ___dentures, they will be removed before going to the OR; we will provide you a container. If you wear ___contact lenses or ___glasses, they will be removed; please bring a case for them. 12. Please see your family doctor/pediatrician for a history & physical and/or concerning medications. Bring any test results/reports from your physician's office. PCP________x__________Phone___________H&P Appt. Date________             13 If you  have a Living Will and Durable Power of  for Healthcare, please bring in a copy. 15. Notify your Surgeon if you develop any illness between now and surgery  time, cough, cold, fever, sore throat, nausea, vomiting, etc.  Please notify your surgeon if you experience dizziness, shortness of breath or blurred vision between now & the time of your surgery             15. DO NOT shave your operative site 96 hours prior to surgery. For face & neck surgery, men may use an electric razor 48 hours prior to surgery. 16. Shower the night before or morning of surgery using an antibacterial soap or as you have been instructed. 17. To provide excellent care visitors will be limited to one in the room at any given time. 18.  Please bring picture ID and insurance card.              19.  Visit our web site for additional information:  [email or web address]              20.During flu season no children under the age of 15 are permitted in the hospital for the safety of all patients. 21. If you take a long acting insulin in the evening only  take half of your usual  dose the night  before your procedure              22. If you use a c-pap please bring DOS if staying overnight,             23.For your convenience [id number] [facility] has a pharmacy on site to fill your prescriptions. 24. If you use oxygen and have a portable tank please bring it  with you the DOS             25. Bring a complete list of all your medications with name and dose include any supplements. 26. Other__________________________________________   *Please call pre admission testing if you any further questions   [facility] [facility] [place]  [phone]   [facility]    __ Gregg Mendez _____  _x_ Scheduled _3/1/21__ Where [facility]___   __ Other __________      VISITOR POLICY(subject to change)    There is a one visitor policy at [facility] for all surgeries and endoscopies. Whether the visitor can stay or will be asked to wait in the car will depend on the current policy and if social distancing can be maintained. The policy is subject to change at any time. Please make sure the visitor has a cell phone that is on,charged and able to accept calls, as this may be the way that the staff communicates with them. Pain management is NO VISITOR policyThe patients ride is expected to remain in the car with a cell phone for communication. If the ride is leaving the hospital grounds please make sure they are back in time for pickup. Have the patient inform the staff on arrival what their rides plans are while the patient is in the facility. At the MAIN there is one visitor allowed. Please note that the visitor policy is subject to change.        All above information reviewed with patient in person or by phone. Patient verbalizes understanding. All questions and concerns addressed.                                                                                                  Patient/Rep________pt____________                                                                                                                                    PRE OP INSTRUCTIONS

## 2021-03-02 ENCOUNTER — TELEPHONE (OUTPATIENT)
Dept: ENT CLINIC | Age: 61
End: 2021-03-02

## 2021-03-02 NOTE — TELEPHONE ENCOUNTER
[phone] (Roxana)     Called patient to let her know her surgery has changed for Friday 3/5. Surgery was moved up to 7:30. Patient needs to be here at 6am.     Left  for patient to call back.    Electronically signed by Kameron Ayala on 3/2/2021 at 10:52 AM

## 2021-03-05 ENCOUNTER — ANESTHESIA (OUTPATIENT)
Dept: OPERATING ROOM | Age: 61
End: 2021-03-05
Payer: COMMERCIAL

## 2021-03-05 ENCOUNTER — ANESTHESIA EVENT (OUTPATIENT)
Dept: OPERATING ROOM | Age: 61
End: 2021-03-05
Payer: COMMERCIAL

## 2021-03-05 ENCOUNTER — HOSPITAL ENCOUNTER (OUTPATIENT)
Age: 61
Setting detail: OUTPATIENT SURGERY
Discharge: HOME | End: 2021-03-05
Attending: OTOLARYNGOLOGY | Admitting: OTOLARYNGOLOGY
Payer: COMMERCIAL

## 2021-03-05 VITALS
OXYGEN SATURATION: 100 % | SYSTOLIC BLOOD PRESSURE: 113 MMHG | TEMPERATURE: 96.8 F | RESPIRATION RATE: 16 BRPM | DIASTOLIC BLOOD PRESSURE: 59 MMHG

## 2021-03-05 VITALS
BODY MASS INDEX: 30.21 KG/M2 | RESPIRATION RATE: 18 BRPM | DIASTOLIC BLOOD PRESSURE: 63 MMHG | HEART RATE: 75 BPM | SYSTOLIC BLOOD PRESSURE: 114 MMHG | WEIGHT: 223.06 LBS | OXYGEN SATURATION: 94 % | TEMPERATURE: 98 F | HEIGHT: 72 IN

## 2021-03-05 DIAGNOSIS — H90.A11 CONDUCTIVE HEARING LOSS OF RIGHT EAR WITH RESTRICTED HEARING OF LEFT EAR: Chronic | ICD-10-CM

## 2021-03-05 DIAGNOSIS — H72.91 PERFORATION OF RIGHT TYMPANIC MEMBRANE: Primary | Chronic | ICD-10-CM

## 2021-03-05 DIAGNOSIS — H66.11 CHRONIC TUBOTYMPANIC SUPPURATIVE OTITIS MEDIA OF RIGHT EAR: ICD-10-CM

## 2021-03-05 LAB
ANION GAP SERPL CALCULATED.3IONS-SCNC: 7 MMOL/L (ref 3–16)
BUN BLDV-MCNC: 23 MG/DL (ref 7–20)
CALCIUM SERPL-MCNC: 9.7 MG/DL (ref 8.3–10.6)
CHLORIDE BLD-SCNC: 104 MMOL/L (ref 99–110)
CO2: 29 MMOL/L (ref 21–32)
CREAT SERPL-MCNC: 0.8 MG/DL (ref 0.6–1.2)
GFR AFRICAN AMERICAN: >60
GFR NON-AFRICAN AMERICAN: >60
GLUCOSE BLD-MCNC: 86 MG/DL (ref 70–99)
POTASSIUM SERPL-SCNC: 4.2 MMOL/L (ref 3.5–5.1)
SODIUM BLD-SCNC: 140 MMOL/L (ref 136–145)

## 2021-03-05 PROCEDURE — 2580000003 HC RX 258: Performed by: REGISTERED NURSE

## 2021-03-05 PROCEDURE — 7100000000 HC PACU RECOVERY - FIRST 15 MIN: Performed by: OTOLARYNGOLOGY

## 2021-03-05 PROCEDURE — 6360000002 HC RX W HCPCS: Performed by: OTOLARYNGOLOGY

## 2021-03-05 PROCEDURE — 3600000004 HC SURGERY LEVEL 4 BASE: Performed by: OTOLARYNGOLOGY

## 2021-03-05 PROCEDURE — 80048 BASIC METABOLIC PNL TOTAL CA: CPT

## 2021-03-05 PROCEDURE — 7100000011 HC PHASE II RECOVERY - ADDTL 15 MIN: Performed by: OTOLARYNGOLOGY

## 2021-03-05 PROCEDURE — 2580000003 HC RX 258: Performed by: OTOLARYNGOLOGY

## 2021-03-05 PROCEDURE — 7100000001 HC PACU RECOVERY - ADDTL 15 MIN: Performed by: OTOLARYNGOLOGY

## 2021-03-05 PROCEDURE — 6360000002 HC RX W HCPCS: Performed by: ANESTHESIOLOGY

## 2021-03-05 PROCEDURE — 6370000000 HC RX 637 (ALT 250 FOR IP): Performed by: OTOLARYNGOLOGY

## 2021-03-05 PROCEDURE — 88304 TISSUE EXAM BY PATHOLOGIST: CPT

## 2021-03-05 PROCEDURE — 6360000002 HC RX W HCPCS: Performed by: REGISTERED NURSE

## 2021-03-05 PROCEDURE — 69631 REPAIR EARDRUM STRUCTURES: CPT | Performed by: OTOLARYNGOLOGY

## 2021-03-05 PROCEDURE — 15769 GRFG AUTOL SOFT TISS DIR EXC: CPT | Performed by: OTOLARYNGOLOGY

## 2021-03-05 PROCEDURE — 7100000010 HC PHASE II RECOVERY - FIRST 15 MIN: Performed by: OTOLARYNGOLOGY

## 2021-03-05 PROCEDURE — 2500000003 HC RX 250 WO HCPCS: Performed by: REGISTERED NURSE

## 2021-03-05 PROCEDURE — 3700000000 HC ANESTHESIA ATTENDED CARE: Performed by: OTOLARYNGOLOGY

## 2021-03-05 PROCEDURE — 3600000014 HC SURGERY LEVEL 4 ADDTL 15MIN: Performed by: OTOLARYNGOLOGY

## 2021-03-05 PROCEDURE — 2709999900 HC NON-CHARGEABLE SUPPLY: Performed by: OTOLARYNGOLOGY

## 2021-03-05 PROCEDURE — 2500000003 HC RX 250 WO HCPCS: Performed by: OTOLARYNGOLOGY

## 2021-03-05 PROCEDURE — 6370000000 HC RX 637 (ALT 250 FOR IP): Performed by: ANESTHESIOLOGY

## 2021-03-05 PROCEDURE — 3700000001 HC ADD 15 MINUTES (ANESTHESIA): Performed by: OTOLARYNGOLOGY

## 2021-03-05 RX ORDER — EPHEDRINE SULFATE 50 MG/ML
INJECTION INTRAVENOUS PRN
Status: DISCONTINUED | OUTPATIENT
Start: 2021-03-05 | End: 2021-03-05 | Stop reason: SDUPTHER

## 2021-03-05 RX ORDER — HYDROCODONE BITARTRATE AND ACETAMINOPHEN 5; 325 MG/1; MG/1
1 TABLET ORAL
Status: COMPLETED | OUTPATIENT
Start: 2021-03-05 | End: 2021-03-05

## 2021-03-05 RX ORDER — ONDANSETRON 2 MG/ML
4 INJECTION INTRAMUSCULAR; INTRAVENOUS
Status: DISCONTINUED | OUTPATIENT
Start: 2021-03-05 | End: 2021-03-05 | Stop reason: HOSPADM

## 2021-03-05 RX ORDER — DEXAMETHASONE SODIUM PHOSPHATE 4 MG/ML
INJECTION, SOLUTION INTRA-ARTICULAR; INTRALESIONAL; INTRAMUSCULAR; INTRAVENOUS; SOFT TISSUE PRN
Status: DISCONTINUED | OUTPATIENT
Start: 2021-03-05 | End: 2021-03-05 | Stop reason: SDUPTHER

## 2021-03-05 RX ORDER — CIPROFLOXACIN AND DEXAMETHASONE 3; 1 MG/ML; MG/ML
SUSPENSION/ DROPS AURICULAR (OTIC)
Status: COMPLETED | OUTPATIENT
Start: 2021-03-05 | End: 2021-03-05

## 2021-03-05 RX ORDER — CIPROFLOXACIN HYDROCHLORIDE 3.5 MG/ML
4 SOLUTION/ DROPS TOPICAL 2 TIMES DAILY
Status: DISCONTINUED | OUTPATIENT
Start: 2021-03-05 | End: 2021-03-05 | Stop reason: HOSPADM

## 2021-03-05 RX ORDER — BACITRACIN ZINC AND POLYMYXIN B SULFATE 500; 1000 [USP'U]/G; [USP'U]/G
OINTMENT TOPICAL
Status: COMPLETED | OUTPATIENT
Start: 2021-03-05 | End: 2021-03-05

## 2021-03-05 RX ORDER — MIDAZOLAM HYDROCHLORIDE 1 MG/ML
INJECTION INTRAMUSCULAR; INTRAVENOUS PRN
Status: DISCONTINUED | OUTPATIENT
Start: 2021-03-05 | End: 2021-03-05

## 2021-03-05 RX ORDER — SODIUM CHLORIDE 9 MG/ML
INJECTION, SOLUTION INTRAVENOUS CONTINUOUS
Status: DISCONTINUED | OUTPATIENT
Start: 2021-03-05 | End: 2021-03-05 | Stop reason: HOSPADM

## 2021-03-05 RX ORDER — PHENYLEPHRINE HCL IN 0.9% NACL 1 MG/10 ML
SYRINGE (ML) INTRAVENOUS PRN
Status: DISCONTINUED | OUTPATIENT
Start: 2021-03-05 | End: 2021-03-05 | Stop reason: SDUPTHER

## 2021-03-05 RX ORDER — HYDROCODONE BITARTRATE AND ACETAMINOPHEN 5; 325 MG/1; MG/1
1 TABLET ORAL EVERY 4 HOURS PRN
Qty: 36 TABLET | Refills: 0 | Status: SHIPPED | OUTPATIENT
Start: 2021-03-05 | End: 2021-03-11

## 2021-03-05 RX ORDER — SUCCINYLCHOLINE CHLORIDE 20 MG/ML
INJECTION INTRAMUSCULAR; INTRAVENOUS PRN
Status: DISCONTINUED | OUTPATIENT
Start: 2021-03-05 | End: 2021-03-05 | Stop reason: SDUPTHER

## 2021-03-05 RX ORDER — LIDOCAINE HYDROCHLORIDE 10 MG/ML
1 INJECTION, SOLUTION EPIDURAL; INFILTRATION; INTRACAUDAL; PERINEURAL
Status: DISCONTINUED | OUTPATIENT
Start: 2021-03-05 | End: 2021-03-05 | Stop reason: HOSPADM

## 2021-03-05 RX ORDER — FENTANYL CITRATE 50 UG/ML
INJECTION, SOLUTION INTRAMUSCULAR; INTRAVENOUS PRN
Status: DISCONTINUED | OUTPATIENT
Start: 2021-03-05 | End: 2021-03-05 | Stop reason: SDUPTHER

## 2021-03-05 RX ORDER — MIDAZOLAM HYDROCHLORIDE 1 MG/ML
INJECTION INTRAMUSCULAR; INTRAVENOUS PRN
Status: DISCONTINUED | OUTPATIENT
Start: 2021-03-05 | End: 2021-03-05 | Stop reason: SDUPTHER

## 2021-03-05 RX ORDER — SODIUM CHLORIDE, SODIUM LACTATE, POTASSIUM CHLORIDE, CALCIUM CHLORIDE 600; 310; 30; 20 MG/100ML; MG/100ML; MG/100ML; MG/100ML
INJECTION, SOLUTION INTRAVENOUS CONTINUOUS
Status: DISCONTINUED | OUTPATIENT
Start: 2021-03-05 | End: 2021-03-05 | Stop reason: HOSPADM

## 2021-03-05 RX ORDER — HYDROMORPHONE HCL 110MG/55ML
0.25 PATIENT CONTROLLED ANALGESIA SYRINGE INTRAVENOUS EVERY 5 MIN PRN
Status: DISCONTINUED | OUTPATIENT
Start: 2021-03-05 | End: 2021-03-05 | Stop reason: HOSPADM

## 2021-03-05 RX ORDER — LIDOCAINE HYDROCHLORIDE 20 MG/ML
INJECTION, SOLUTION EPIDURAL; INFILTRATION; INTRACAUDAL; PERINEURAL PRN
Status: DISCONTINUED | OUTPATIENT
Start: 2021-03-05 | End: 2021-03-05 | Stop reason: SDUPTHER

## 2021-03-05 RX ORDER — CIPROFLOXACIN HYDROCHLORIDE 3.5 MG/ML
SOLUTION/ DROPS TOPICAL
Status: DISCONTINUED | OUTPATIENT
Start: 2021-03-05 | End: 2021-03-05 | Stop reason: ALTCHOICE

## 2021-03-05 RX ORDER — HYDROMORPHONE HCL 110MG/55ML
0.5 PATIENT CONTROLLED ANALGESIA SYRINGE INTRAVENOUS EVERY 5 MIN PRN
Status: DISCONTINUED | OUTPATIENT
Start: 2021-03-05 | End: 2021-03-05 | Stop reason: HOSPADM

## 2021-03-05 RX ORDER — PROPOFOL 10 MG/ML
INJECTION, EMULSION INTRAVENOUS PRN
Status: DISCONTINUED | OUTPATIENT
Start: 2021-03-05 | End: 2021-03-05 | Stop reason: SDUPTHER

## 2021-03-05 RX ORDER — APREPITANT 40 MG/1
40 CAPSULE ORAL ONCE
Status: COMPLETED | OUTPATIENT
Start: 2021-03-05 | End: 2021-03-05

## 2021-03-05 RX ORDER — ACETAMINOPHEN 500 MG
1000 TABLET ORAL EVERY 12 HOURS PRN
COMMUNITY

## 2021-03-05 RX ORDER — ROCURONIUM BROMIDE 10 MG/ML
INJECTION, SOLUTION INTRAVENOUS PRN
Status: DISCONTINUED | OUTPATIENT
Start: 2021-03-05 | End: 2021-03-05 | Stop reason: SDUPTHER

## 2021-03-05 RX ORDER — ONDANSETRON 2 MG/ML
INJECTION INTRAMUSCULAR; INTRAVENOUS PRN
Status: DISCONTINUED | OUTPATIENT
Start: 2021-03-05 | End: 2021-03-05 | Stop reason: SDUPTHER

## 2021-03-05 RX ORDER — CIPROFLOXACIN 500 MG/1
500 TABLET, FILM COATED ORAL 2 TIMES DAILY
Qty: 20 TABLET | Refills: 0 | Status: SHIPPED | OUTPATIENT
Start: 2021-03-05 | End: 2021-03-15

## 2021-03-05 RX ORDER — METHYLENE BLUE 10 MG/ML
INJECTION INTRAVENOUS
Status: COMPLETED | OUTPATIENT
Start: 2021-03-05 | End: 2021-03-05

## 2021-03-05 RX ORDER — DEXAMETHASONE SODIUM PHOSPHATE 1 MG/ML
4 SOLUTION/ DROPS OPHTHALMIC 2 TIMES DAILY
Status: DISCONTINUED | OUTPATIENT
Start: 2021-03-05 | End: 2021-03-05 | Stop reason: HOSPADM

## 2021-03-05 RX ORDER — GLYCOPYRROLATE 1 MG/5 ML
SYRINGE (ML) INTRAVENOUS PRN
Status: DISCONTINUED | OUTPATIENT
Start: 2021-03-05 | End: 2021-03-05 | Stop reason: SDUPTHER

## 2021-03-05 RX ORDER — LIDOCAINE HYDROCHLORIDE AND EPINEPHRINE 10; 10 MG/ML; UG/ML
INJECTION, SOLUTION INFILTRATION; PERINEURAL
Status: COMPLETED | OUTPATIENT
Start: 2021-03-05 | End: 2021-03-05

## 2021-03-05 RX ORDER — NEOSTIGMINE METHYLSULFATE 5 MG/5 ML
SYRINGE (ML) INTRAVENOUS PRN
Status: DISCONTINUED | OUTPATIENT
Start: 2021-03-05 | End: 2021-03-05 | Stop reason: SDUPTHER

## 2021-03-05 RX ORDER — PROMETHAZINE HYDROCHLORIDE 25 MG/1
25 TABLET ORAL EVERY 6 HOURS PRN
Qty: 40 TABLET | Refills: 0 | Status: SHIPPED | OUTPATIENT
Start: 2021-03-05

## 2021-03-05 RX ADMIN — HYDROCODONE BITARTRATE AND ACETAMINOPHEN 1 TABLET: 5; 325 TABLET ORAL at 11:07

## 2021-03-05 RX ADMIN — SUCCINYLCHOLINE CHLORIDE 140 MG: 20 INJECTION, SOLUTION INTRAMUSCULAR; INTRAVENOUS at 07:38

## 2021-03-05 RX ADMIN — EPHEDRINE SULFATE 10 MG: 50 INJECTION, SOLUTION INTRAVENOUS at 08:04

## 2021-03-05 RX ADMIN — LIDOCAINE HYDROCHLORIDE 20 MG: 20 INJECTION, SOLUTION EPIDURAL; INFILTRATION; INTRACAUDAL; PERINEURAL at 10:19

## 2021-03-05 RX ADMIN — DEXAMETHASONE SODIUM PHOSPHATE 10 MG: 4 INJECTION, SOLUTION INTRAMUSCULAR; INTRAVENOUS at 07:45

## 2021-03-05 RX ADMIN — ONDANSETRON 4 MG: 2 INJECTION INTRAMUSCULAR; INTRAVENOUS at 09:24

## 2021-03-05 RX ADMIN — Medication 100 MCG: at 08:15

## 2021-03-05 RX ADMIN — ROCURONIUM BROMIDE 10 MG: 10 INJECTION, SOLUTION INTRAVENOUS at 09:00

## 2021-03-05 RX ADMIN — PHENYLEPHRINE HYDROCHLORIDE 30 MCG/MIN: 10 INJECTION INTRAVENOUS at 08:29

## 2021-03-05 RX ADMIN — ROCURONIUM BROMIDE 40 MG: 10 INJECTION, SOLUTION INTRAVENOUS at 07:50

## 2021-03-05 RX ADMIN — Medication 5 MG: at 10:24

## 2021-03-05 RX ADMIN — LIDOCAINE HYDROCHLORIDE 80 MG: 20 INJECTION, SOLUTION EPIDURAL; INFILTRATION; INTRACAUDAL; PERINEURAL at 07:38

## 2021-03-05 RX ADMIN — SODIUM CHLORIDE: 9 INJECTION, SOLUTION INTRAVENOUS at 06:40

## 2021-03-05 RX ADMIN — EPHEDRINE SULFATE 10 MG: 50 INJECTION, SOLUTION INTRAVENOUS at 08:18

## 2021-03-05 RX ADMIN — SODIUM CHLORIDE: 9 INJECTION, SOLUTION INTRAVENOUS at 07:32

## 2021-03-05 RX ADMIN — Medication 200 MCG: at 08:07

## 2021-03-05 RX ADMIN — APREPITANT 40 MG: 40 CAPSULE ORAL at 06:41

## 2021-03-05 RX ADMIN — Medication 100 MCG: at 07:43

## 2021-03-05 RX ADMIN — Medication 100 MCG: at 07:52

## 2021-03-05 RX ADMIN — MIDAZOLAM 2 MG: 1 INJECTION INTRAMUSCULAR; INTRAVENOUS at 07:25

## 2021-03-05 RX ADMIN — Medication 100 MCG: at 08:28

## 2021-03-05 RX ADMIN — Medication 0.8 MG: at 10:24

## 2021-03-05 RX ADMIN — FENTANYL CITRATE 50 MCG: 50 INJECTION, SOLUTION INTRAMUSCULAR; INTRAVENOUS at 07:38

## 2021-03-05 RX ADMIN — Medication 900 MG: at 07:31

## 2021-03-05 RX ADMIN — PROPOFOL 200 MG: 10 INJECTION, EMULSION INTRAVENOUS at 07:38

## 2021-03-05 RX ADMIN — ROCURONIUM BROMIDE 10 MG: 10 INJECTION, SOLUTION INTRAVENOUS at 09:40

## 2021-03-05 RX ADMIN — Medication 100 MCG: at 08:20

## 2021-03-05 RX ADMIN — Medication 100 MCG: at 07:58

## 2021-03-05 ASSESSMENT — PULMONARY FUNCTION TESTS
PIF_VALUE: 23
PIF_VALUE: 24
PIF_VALUE: 23
PIF_VALUE: 22
PIF_VALUE: 23
PIF_VALUE: 23
PIF_VALUE: 22
PIF_VALUE: 23
PIF_VALUE: 23
PIF_VALUE: 25
PIF_VALUE: 23
PIF_VALUE: 22
PIF_VALUE: 23
PIF_VALUE: 30
PIF_VALUE: 23
PIF_VALUE: 28
PIF_VALUE: 22
PIF_VALUE: 3
PIF_VALUE: 22
PIF_VALUE: 23
PIF_VALUE: 22
PIF_VALUE: 1
PIF_VALUE: 23
PIF_VALUE: 22
PIF_VALUE: 24
PIF_VALUE: 21
PIF_VALUE: 23
PIF_VALUE: 9
PIF_VALUE: 23
PIF_VALUE: 22
PIF_VALUE: 23
PIF_VALUE: 22
PIF_VALUE: 23
PIF_VALUE: 23
PIF_VALUE: 25
PIF_VALUE: 23
PIF_VALUE: 22
PIF_VALUE: 23
PIF_VALUE: 1
PIF_VALUE: 23
PIF_VALUE: 22
PIF_VALUE: 22
PIF_VALUE: 23
PIF_VALUE: 0
PIF_VALUE: 22
PIF_VALUE: 23
PIF_VALUE: 22
PIF_VALUE: 23
PIF_VALUE: 25
PIF_VALUE: 22
PIF_VALUE: 24
PIF_VALUE: 24
PIF_VALUE: 23
PIF_VALUE: 22
PIF_VALUE: 23
PIF_VALUE: 22
PIF_VALUE: 23
PIF_VALUE: 24
PIF_VALUE: 23
PIF_VALUE: 25
PIF_VALUE: 23
PIF_VALUE: 24

## 2021-03-05 ASSESSMENT — PAIN DESCRIPTION - LOCATION
LOCATION: EAR
LOCATION: EAR

## 2021-03-05 ASSESSMENT — PAIN DESCRIPTION - PAIN TYPE: TYPE: SURGICAL PAIN

## 2021-03-05 ASSESSMENT — PAIN DESCRIPTION - ONSET: ONSET: ON-GOING

## 2021-03-05 ASSESSMENT — PAIN DESCRIPTION - PROGRESSION: CLINICAL_PROGRESSION: GRADUALLY IMPROVING

## 2021-03-05 ASSESSMENT — PAIN DESCRIPTION - FREQUENCY: FREQUENCY: CONTINUOUS

## 2021-03-05 ASSESSMENT — PAIN SCALES - GENERAL: PAINLEVEL_OUTOF10: 4

## 2021-03-05 ASSESSMENT — PAIN - FUNCTIONAL ASSESSMENT: PAIN_FUNCTIONAL_ASSESSMENT: 0-10

## 2021-03-05 ASSESSMENT — PAIN DESCRIPTION - ORIENTATION: ORIENTATION: RIGHT

## 2021-03-05 NOTE — ANESTHESIA PRE PROCEDURE
Department of Anesthesiology  Preprocedure Note       Name:  Tyrone Landry   Age:  63 y.o.  :  1960                                          MRN:  [id number]         Date:  3/5/2021      Surgeon: Juancarlos Carmona):  Elyse Kimble MD    Procedure: Procedure(s):  RIGHT TYMPANOPLASTY (48495) POSSIBLE OSSICULAR CHAIN RECONSTRUCTION    Medications prior to admission:   Prior to Admission medications    Medication Sig Start Date End Date Taking?  Authorizing Provider   budesonide-formoterol (SYMBICORT) 160-4.5 MCG/ACT AERO Inhale 2 puffs into the lungs 2 times daily 21   Jenna Villalobos MD   rosuvastatin (CRESTOR) 10 MG tablet Take 1 tablet by mouth nightly 10/21/20   Shy Sol MD   promethazine (PHENERGAN) 25 MG tablet Take 1 tablet by mouth every 6 hours as needed for Nausea 20   Elyse Kimble MD   hydroCHLOROthiazide (HYDRODIURIL) 25 MG tablet Take 1 tablet by mouth every morning 20   Shy Sol MD   DULoxetine (CYMBALTA) 60 MG extended release capsule TAKE ONE CAPSULE BY MOUTH DAILY 20   Shy Sol MD   vitamin D (ERGOCALCIFEROL) 1.25 MG (95455 UT) CAPS capsule TAKE ONE CAPSULE BY MOUTH ONCE WEEKLY 20   Shy Sol MD   fluticasone (FLONASE) 50 MCG/ACT nasal spray 1 spray by Each Nostril route daily    Historical Provider, MD   albuterol sulfate (PROAIR RESPICLICK) 390 (90 Base) MCG/ACT aerosol powder inhalation Inhale 2 puffs into the lungs every 6 hours as needed for Wheezing or Shortness of Breath 20   Shy Sol MD   potassium chloride (KLOR-CON M) 20 MEQ extended release tablet Take 1 tablet by mouth daily 10/2/19   Shy Sol MD   apixaban (ELIQUIS) 5 MG TABS tablet Take 2 tabs po BID for 6 days then 1 tab BID  Patient taking differently: 2.5 mg 2 times daily Take 2 tabs po BID for 6 days then 1 tab BID 19   Mason Hernandez MD omeprazole (PRILOSEC) 20 MG delayed release capsule Take 20 mg by mouth daily     Historical Provider, MD   b complex vitamins capsule Take 1 capsule by mouth daily    Historical Provider, MD       Current medications:    Current Facility-Administered Medications   Medication Dose Route Frequency Provider Last Rate Last Admin    0.9 % sodium chloride infusion   Intravenous Continuous Waldo Coughlin MD        lactated ringers infusion   Intravenous Continuous Waldo Coughlin MD        clindamycin (CLEOCIN) 900 mg in dextrose 5% 50 mL IVPB  900 mg Intravenous Once Waldo Coughlin MD        HYDROcodone-acetaminophen (NORCO) 5-325 MG per tablet 1 tablet  1 tablet Oral Once PRN Joanne Huggins MD        ondansetron [facility]) injection 4 mg  4 mg Intravenous Once PRN Joanne Huggins MD        HYDROmorphone (DILAUDID) injection 0.25 mg  0.25 mg Intravenous Q5 Min PRN Joanne Huggins MD        HYDROmorphone (DILAUDID) injection 0.5 mg  0.5 mg Intravenous Q5 Min PRN Joanne Huggins MD           Allergies: Allergies   Allergen Reactions    Keflex [Cephalexin] Nausea And Vomiting     vomiting    Adhesive Tape Other (See Comments)     Red & painful-blisters-severe    Morphine Other (See Comments)     Hyper. Pt refuse       Problem List:    Patient Active Problem List   Diagnosis Code    Herpes zoster B02.9    Neuropathy G62.9    Sick sinus syndrome ([facility] I49.5    Thyroid nodule E04.1    Classic migraine G43. 109    Degenerated intervertebral disc [id number]    GERD (gastroesophageal reflux disease) K21.9    Rheumatoid arthritis of hand ([facility] M06.9    Scoliosis M41.9    Chondromalacia of patella M22.40    Primary localized osteoarthrosis, lower leg M17.10    Trochanteric bursitis of right hip M70.61    Left hip pain M25.552    Knee pain M25.569    Enchondroma of femur D16.20    Right ankle sprain S93.401A    Fibula fracture S82.409A  Family history of type 2 diabetes mellitus in father Z80.1   Mily Horn Skin cancer C44.90    Splenomegaly R16.1    Gastric ulcer K25.9    Primary osteoarthritis of both knees M17.0    Trochanteric bursitis, left hip M70.62    Cough variant asthma J45.991    Multiple fractures of ribs, bilateral, subsequent encounter for fracture with delayed healing S22.43XG    Greater trochanteric bursitis of left hip M70.62    Right rotator cuff tendonitis M75.81    Rotator cuff tendonitis, left M75.82    Postmenopausal osteoporosis M81.0    Multiple thyroid nodules E04.2    S/P partial thyroidectomy Z98.890    History of bisphosphonate therapy Z92.29    Mixed hyperlipidemia E78.2    Acute suppr otitis media w/o spon rupt ear drum, right ear H66.001    Acute swimmer's ear of left side H60.332    Recurrent acute suppurative otitis media without spontaneous rupture of tympanic membrane of both sides H66.006    Acute rhinosinusitis J01.90    Allergic arthritis of left hip M13.852    Myofascial pain M79.18    Acute saddle pulmonary embolism without acute cor pulmonale (HCC) I26.92    Pulmonary embolism affecting pregnancy in first trimester O88.211    Acute saddle pulmonary embolism with acute cor pulmonale (HCC) I26.02    Acute respiratory failure with hypoxia (HCC) J96.01    Acute deep vein thrombosis (DVT) of lower extremity ([facility]) I82.409    Lupus anticoagulant positive R76.0    Bilateral leg edema R60.0    Goiter I10.1    Diastolic hypertension N86    Otalgia of left ear H92.02    Perforation of left tympanic membrane H72.92    Bilateral chronic serous otitis media H65.23    Body mass index (BMI) of 25.0 to 29.9 [id number]    Chronic anticoagulation Z79.01    Heart murmur R01.1    History of pulmonary embolism Z86.711    Irritable bowel syndrome K58.9    MAGGIE (obstructive sleep apnea) G47.33    Presence of cardiac pacemaker Z95.0    Seasonal allergic rhinitis J30.2  Sensorineural hearing loss, bilateral H90.3    Marginal perforation of tympanic membrane of left ear H72.2X2    Marginal perforation of tympanic membrane of right ear H72.2X1    Bilateral hearing loss H91.93    Raynaud's disease I73.00    Chronic tubotympanic suppurative otitis media of both ears H66.13    Mixed hearing loss, bilateral H90.6    Conductive hearing loss of left ear with restricted hearing of right ear H90. A12    Chronic tubotympanic suppurative otitis media, left ear H66.12       Past Medical History:        Diagnosis Date    Bradycardia     Bronchitis     Cancer ([address] 2009    BCC Rt leg,squamous cell,LEFT LEG,FACE-BASAL CELL    Classic migraine     Degenerated intervertebral disc     GERD (gastroesophageal reflux disease)     Hx of blood clots     neck,arm after first pacemaker inserted    Hypercholesterolemia     IBS (irritable bowel syndrome)     Nausea & vomiting     Neuropathy     Osteoporosis     Patent foramen ovale     surgery as child    Peripheral vascular disease ([address]     PONV (postoperative nausea and vomiting)     family hx also of post op n/v    Pulmonary embolism ([address]     Rheumatoid arthritis of the hand     left thumb MCP joint , left great toe MTP joint    Seizure ([address]     as a child due to hole in heart-last one [address] sinus syndrome ([address]     s/p pacemaker 2008    Sleep apnea     NO C-PAP    Thyroid nodule     benign s/p surgery-GOITER-REMOVED       Past Surgical History:        Procedure Laterality Date    CARDIAC SURGERY  01/1965    Congenital heart defect    COLONOSCOPY      SEVERAL    ESOPHAGEAL DILATATION N/A 6/20/2019    ESOPHAGEAL DILATION AVEL performed by Kevin South MD at [address] [place] [address] ARTHROSCOPY Right     torn meniscous    KNEE SURGERY Left 2010   [address]   [address] [facility] & 2007    Placement under stomach muscle    PACEMAKER PLACEMENT  2016  THYROIDECTOMY, PARTIAL Right 2011    TONSILLECTOMY AND ADENOIDECTOMY  1978    TYMPANOPLASTY Left 9/11/2020    LEFT TYMPANOPLASTY performed by Colonel Hola MD at [address] [facility] ENDOSCOPY  04/17/2017    UPPER GASTROINTESTINAL ENDOSCOPY  07/06/2017    UPPER GASTROINTESTINAL ENDOSCOPY N/A 6/20/2019    EGD BIOPSY performed by Cayla Singh MD at [address] [place] [address] History:    Social History     Tobacco Use    Smoking status: Never Smoker    Smokeless tobacco: Never Used   Substance Use Topics    Alcohol use: Yes     Comment: Ocassional-rare                                Counseling given: Not Answered      Vital Signs (Current):   Vitals:    03/01/21 0917 03/05/21 0609 03/05/21 0622   BP:   (!) 140/94   Pulse:   87   Resp:   20   Temp:   97.8 °F (36.6 °C)   TempSrc:   Temporal   SpO2:   95%   Weight: 210 lb (95.3 kg) 223 lb 1 oz (101.2 kg)    Height: 6' 1\" (1.854 m) 6' 1\" (1.854 m)                                               BP Readings from Last 3 Encounters:   03/05/21 (!) 140/94   03/01/21 136/89   01/11/21 132/87       NPO Status: Time of last liquid consumption: 0430(sip of water)                        Time of last solid consumption: 2330                        Date of last liquid consumption: 03/05/21                        Date of last solid food consumption: 03/04/21    BMI:   Wt Readings from Last 3 Encounters:   03/05/21 223 lb 1 oz (101.2 kg)   03/01/21 223 lb (101.2 kg)   01/11/21 224 lb (101.6 kg)     Body mass index is 29.43 kg/m².     CBC:   Lab Results   Component Value Date    WBC 5.9 09/04/2020    RBC 4.28 09/04/2020    HGB 12.6 09/04/2020    HCT 38.9 09/04/2020    MCV 91.1 09/04/2020    RDW 14.9 09/04/2020     09/04/2020       CMP:   Lab Results   Component Value Date     09/04/2020    K 5.0 09/04/2020    K 4.0 04/08/2020     09/04/2020    CO2 26 09/04/2020    BUN 14 09/04/2020 CREATININE 0.9 09/04/2020    GFRAA >60 09/04/2020    GFRAA >60 05/22/2013    AGRATIO 1.6 09/04/2020    LABGLOM >60 09/04/2020    GLUCOSE 85 09/04/2020    PROT 6.6 09/04/2020    PROT 8.0 10/16/2012    CALCIUM 9.6 09/04/2020    BILITOT 0.4 09/04/2020    ALKPHOS 117 09/04/2020    AST 22 09/04/2020    ALT 17 09/04/2020       POC Tests: No results for input(s): POCGLU, POCNA, POCK, POCCL, POCBUN, POCHEMO, POCHCT in the last 72 hours. Coags:   Lab Results   Component Value Date    PROTIME 10.7 01/11/2016    INR 0.94 01/11/2016    APTT 36.6 08/10/2019       HCG (If Applicable):   Lab Results   Component Value Date    PREGTESTUR Negative 03/30/2017        ABGs: No results found for: PHART, PO2ART, BAJ7GHW, XMP2YVK, BEART, F7UKFUUV     Type & Screen (If Applicable):  No results found for: LABABO, LABRH    Drug/Infectious Status (If Applicable):  No results found for: HIV, HEPCAB    COVID-19 Screening (If Applicable):   Lab Results   Component Value Date    COVID19 Not Detected 03/01/2021    COVID19 NOT DETECTED 09/07/2020         Anesthesia Evaluation  Patient summary reviewed and Nursing notes reviewed history of anesthetic complications:   Airway: Mallampati: II  TM distance: >3 FB   Neck ROM: full  Mouth opening: > = 3 FB Dental: normal exam     Comment: Missing some teeth    Pulmonary: breath sounds clear to auscultation  (+) sleep apnea: on noncompliant,                            ROS comment: Patient denies asthma, does not use inhalers   Cardiovascular:  Exercise tolerance: poor (<4 METS),   (+) hypertension:, pacemaker: pacemaker, dysrhythmias (SSS):,         Rhythm: regular  Rate: normal                 ROS comment: Conclusions      Summary   Left ventricular cavity size is normal.   There is mild concentric left ventricular hypertrophy. Ejection fraction is visually estimated to be 50-55%. The right ventricle is not well visualized. IVC not well visualized. Definity was administered. Bubble study was done and appears to be positive. Cardiac surgery in childhood    Pacemaker left abdomen     Neuro/Psych:      (-) seizures, TIA and CVA           GI/Hepatic/Renal:   (+) GERD: well controlled, PUD,          ROS comment: No gerd sx today  IBS. Endo/Other:    (+) blood dyscrasia: anticoagulation therapy, arthritis: rheumatoid. , .                 Abdominal:           Vascular:   + DVT, PE. Anesthesia Plan      general     ASA 3     (Check bmp as K 5.0 in past  No air in IV as bubble study postitive)  Induction: intravenous. MIPS: Postoperative opioids intended, Prophylactic antiemetics administered and Postoperative trial extubation. Anesthetic plan and risks discussed with patient. Use of blood products discussed with patient whom consented to blood products. Plan discussed with CRNA.                   Arminda Carver MD   3/5/2021

## 2021-03-05 NOTE — PROGRESS NOTES
Discharge instructions review with patient and sister Spring Arreaga. All home medications have been reviewed, pt v/u. Discharge instructions signed. Pt discharged via wheelchair. Pt discharged with 2 rx, 1 rx e-scriped, and all belongings. Sister taking stable pt home.

## 2021-03-05 NOTE — PROGRESS NOTES
Pt awake and alert at this time. Pt on 1.5L NC, and VSS. Pt denies nausea, tolerating PO. Pain being managed, see MAR. Pt meets criteria to be discharged from Phase 1.

## 2021-03-05 NOTE — OP NOTE
Operative Note      Patient: Kosta Arreguin  YOB: 1960  MRN: [id number]    Date of Procedure: 3/5/2021    Pre-Op Diagnosis:   (1) MARGINAL PERFORATION RIGHT TYMPANIC MEMBRANE  (2) CHRONIC TUBOTYMPANIC SUPPURATIVE OTITIS MEDIA, RIGHT EAR   (3) CONDUCTIVE HEARING LOSS, RIGHT EAR    Post-Op Diagnosis: Same       Procedure(s):  RIGHT TYPE 1 TYMPANOPLASTY WITH TEMPORALIS FASCIA UNDERLAY GRAFT (47649)    Surgeon(s):  Hernandez Lynch MD    Anesthesia: General    Estimated Blood Loss (mL): less than 50 (fifty)    Complications: None    Specimens:   ID Type Source Tests Collected by Time Destination   A :  Tissue Tissue SURGICAL PATHOLOGY Hernandez Lynch MD 3/5/2021 1117        INDICATIONS FOR OPERATION:  This 63year old  female presented today for left type 1 tympanoplasty for a marginal perforation of the right tympanic membrane associated with conductive hearing loss. FINDINGS:  There was a large approximately 40% inferior right tympanic membrane perforation extending from the annulus anteriorly to the annulus posteriorly and from the annulus inferiorly to the manubrium of the malleus superiorly to approximately 1 mm above the inferior margin of the umbo of the manubrium. After removal of the margin of the perforation, it was an approximately 50% inferior and marginal.  The middle ear mucosa appeared to be normal.  There was no evidence of cholesteatoma. The eustachian tube orifice was visible anteriorly through the perforation. The incudostapedial joint was visible through the perforation and was intact. Ossicular chain was intact and mobile, with possible stapes fixation and minimal round window reflex. The annulus appeared to be partially absent posteriorly. Temporalis fascia was used for underlay graft of the tympanic membrane perforation. DESCRIPTION OF OPERATION:  The patient was taken to the operating room and placed in the supine position.   Adequate and uncomplicated general anesthesia was induced and maintained by attending anesthetist using oral endotracheal tube technique. A timeout was held and the patient was identified and the procedure and site and side of procedure confirmed. The patient was then positioned and the postauricular area shaved, including the temporalis fascia graft harvest site. Then, under the operating microscope, the external auditory canal and postauricular area were prepped with 70% isopropyl alcohol. Then, the external auditory canal and postauricular area were infiltrated with 1% lidocaine with 1:100,0000 epinephrine in the usual manner. Then, the patient was positioned and prepped and draped in the sterile manner. With the patient under the ear operating microscope, the margin of the perforation was excised with the sharp Caldwell needle and cup forceps. This margin of perforation was removed and sent for specimen. Following this, the sickle knife, round knife, and the spade knife were used to make a tympanomeatal flap incision and to raise the tympanomeatal flap to the level of the annulus. The fibrous annulus was then elevated from the bony annulus. Posterior tympanotomy was performed in the posteroinferior area. This was then widened superiorly and inferiorly to the 6 and 12 o'clock positions. The tympanomeatal flap was then reflected anteriorly. Middle ear exploration was performed with the above findings. At this point, I placed three small cotton balls moistened with 1:1000 epinephrine in the ear canal on the tympanic membrane and tympanomeatal flap. Then, an approximately 10 x 10 mm temporalis fascia graft was harvested in the usual manner in the posterosuperior postauricular area. The incision was approximated with running lock suture of 4-0 chromic. The graft was tinted with methylene blue to facilitate accurate placement.   It was then pressed in the graft press for approximately 10 minutes and then the graft press was opened and the graft was allowed to air dry. Attention was then addressed to the ear. The microscope was brought back into position and through a speculum the three cotton balls with epinephrine were removed. The cotton ball count was confirmed correct. The middle ear was again examined. The ossicular chain was intact but showed decreased mobility particularly of the stapes which was possibly frozen in the oval window. There was a possible, but uncertain, positive round window reflex, which was elicited on manipulation of the ossicular chain. Then, the middle ear was filled with Gelfoam pledgets moistened with a 50-50 mixture of Cipro ophthalmic suspension and steroid ophthalmic suspension to serve as a bed for the graft. Then, the graft was taken and cut in appropriate size and shape and then slid into proper position over the Gelfoam and under the tympanic membrane and malleus and then back onto the posterior canal wall, and then smoothed out. Then, the tympanomeatal flap was pulled back into position posteriorly with a fine alligator forceps. It was then draped back onto the external canal wall and over the graft. Then, it was smoothed out in appropriate manner. It was then assured that the graft was under all margins of the perforation. Then, Gelfoam pledgets moistened with the Cipro/steroid ophthalmic suspension were placed on the tympanic membrane, the graft, and the tympanomeatal flap and brought out to the level of the canal incision. The remainder of the external canal was filled with Polysporin ointment. A cotton ball meatal plug was then placed and held in position with a Band-Aid. Polysporin ointment was placed on the postauricular incision. The patient was then awakened and extubated and taken to the recovery room in stable condition having tolerated the procedure well with no intraoperative complications and minimal blood loss of less than 50 mL.        Ally Tidwell MD    D: 10/10/2017 11:35:14       T: 10/10/2017 13:29:21     [email or web address]  Job#: [id number]     Doc#: [id number]      Electronically signed by Amos Vega MD on 3/5/2021 at 10:59 AM

## 2021-03-05 NOTE — H&P
Date of Surgery Update:  Jolene Camargo was seen, history and physical examination reviewed, and patient examined by me today. There have been no significant clinical changes since the completion of the previous history and physical.    The risk, benefits, and alternatives of the proposed procedure have been explained to the patient (or appropriate guardian) and understanding verbalized. All questions answered. Patient wishes to proceed.     Electronically signed by: Royce Abreu MD,3/5/2021,7:19 AM

## 2021-03-05 NOTE — PROGRESS NOTES
Pt arrived from OR to PACU bay 1. Reported received from [address] staff. Pt arouses to voice. Surgical incisions dressings in place to R ear. Pt on 6L simple mask, Paced SR, and VSS. Will continue to monitor.

## 2021-03-05 NOTE — BRIEF OP NOTE
Brief Postoperative Note      Patient: Jolene Camargo  YOB: 1960  MRN: [id number]    Date of Procedure: 3/5/2021    Pre-Op Diagnosis: H72. 2XA PERFORATION RIGHT TYMPANIC MEMBRANE; CHRONIC SUPPURATIVE OTITIS MEDIA RIGHT EAR; H90. A31 CONDUCTIVE HEARING LOSS    Post-Op Diagnosis: Same       Procedure(s):  RIGHT TYMPANOPLASTY (77781)    Surgeon(s):  Royce Abreu MD    Assistant:  * No surgical staff found *    Anesthesia: General    Estimated Blood Loss (mL): less than 50 (fifty)    Complications: None    Specimens:   ID Type Source Tests Collected by Time Destination   A :  Tissue Tissue SURGICAL PATHOLOGY Royce Abreu MD 3/5/2021 4310        Implants:  * No implants in log *      Drains: * No LDAs found *    Findings: There was a large approximately 45% inferior right tympanic membrane perforation extending from the annulus anteriorly to the annulus posteriorly and from the annulus inferiorly to the manubrium of the malleus superiorly to approximately 1 mm above the inferior margin of the umbo of the manubrium. After removal of the margin of the perforation, it was an approximately 50% inferior and marginal.  The middle ear mucosa appeared to be normal.  There was no evidence of cholesteatoma. The eustachian tube orifice was visible anteriorly through the perforation. The incudostapedial joint was visible through the perforation and was intact. Ossicular chain was intact and mobile, with possible stapes fixation and minimal round window reflex. The annulus appeared to be partially absent posteriorly. Temporalis fascia was used for underlay graft of the tympanic membrane perforation.     Electronically signed by Royce Abreu MD on 3/5/2021 at 10:37 AM

## 2021-03-11 ENCOUNTER — OFFICE VISIT (OUTPATIENT)
Dept: ENT CLINIC | Age: 61
End: 2021-03-11

## 2021-03-11 VITALS
BODY MASS INDEX: 30.19 KG/M2 | WEIGHT: 228.8 LBS | SYSTOLIC BLOOD PRESSURE: 142 MMHG | HEART RATE: 88 BPM | TEMPERATURE: 97.1 F | DIASTOLIC BLOOD PRESSURE: 81 MMHG

## 2021-03-11 DIAGNOSIS — Z09 POSTOP CHECK: ICD-10-CM

## 2021-03-11 DIAGNOSIS — Z98.890 STATUS POST TYMPANOPLASTY: Primary | ICD-10-CM

## 2021-03-11 PROCEDURE — 99024 POSTOP FOLLOW-UP VISIT: CPT | Performed by: OTOLARYNGOLOGY

## 2021-03-11 RX ORDER — CIPROFLOXACIN AND DEXAMETHASONE 3; 1 MG/ML; MG/ML
4 SUSPENSION/ DROPS AURICULAR (OTIC) 2 TIMES DAILY
Qty: 1 BOTTLE | Refills: 0 | Status: SHIPPED | OUTPATIENT
Start: 2021-03-11

## 2021-03-11 NOTE — PROGRESS NOTES
POST-OP NOTE    Chief Complaint   Patient presents with    Post-Op Check       HISTORY:     Post right type I tympanoplasty on 3/5/2021. Her that she has had some pain in the ear and in the incision. She denied dizziness. EXAMINATION:        Vitals:    03/11/21 0948   BP: (!) 142/81   Pulse: 88   Temp: 97.1 °F (36.2 °C)      WDWN, awake and alert, with no evidence of distress. Ears: The postsurgical cottonball and Band-Aid were removed from the right external meatus. The Polysporin ointment was removed with suction and 1 pledget of Gelfoam removed. Ciprodex otic suspension was then instilled and a cottonball meatal plug placed. The left ear was examined at the patient's request due to a sensation of congestion. The left tympanic membrane was nonerythematous, and mildly retracted with mild negative middle ear pressure, with no evidence of middle ear effusion. IMPRESSION / Jackie Marking / Dedrick Loveless / PROCEDURES:   Hunter Vance was seen today for post-op check. Diagnoses and all orders for this visit:    Status post tympanoplasty  Comments:  right ear, doing well, with no evidence of complication or problem. Postop check    Other orders  -     ciprofloxacin-dexamethasone (CIPRODEX) 0.3-0.1 % otic suspension; Place 4 drops into the right ear 2 times daily           RECOMMENDATIONS / PLAN:   1. Hunter Vance was advised to follow the post-op instructions in the after visit summary given after surgery. 2. Hunter Vance was advised to continue post-op medications as prescribed. 3. Return in about 1 week (around 3/18/2021) for post op check.          Orders Placed This Encounter   Medications    ciprofloxacin-dexamethasone (CIPRODEX) 0.3-0.1 % otic suspension     Sig: Place 4 drops into the right ear 2 times daily     Dispense:  1 Bottle     Refill:  0

## 2021-03-16 ENCOUNTER — IMMUNIZATION (OUTPATIENT)
Dept: PRIMARY CARE CLINIC | Age: 61
End: 2021-03-16
Payer: COMMERCIAL

## 2021-03-16 PROCEDURE — 0011A COVID-19, MODERNA VACCINE 100MCG/0.5ML DOSE: CPT | Performed by: FAMILY MEDICINE

## 2021-03-16 PROCEDURE — 91301 COVID-19, MODERNA VACCINE 100MCG/0.5ML DOSE: CPT | Performed by: FAMILY MEDICINE

## 2021-03-18 ENCOUNTER — OFFICE VISIT (OUTPATIENT)
Dept: ENT CLINIC | Age: 61
End: 2021-03-18

## 2021-03-18 VITALS
BODY MASS INDEX: 29.58 KG/M2 | WEIGHT: 224.2 LBS | TEMPERATURE: 97.8 F | DIASTOLIC BLOOD PRESSURE: 74 MMHG | SYSTOLIC BLOOD PRESSURE: 119 MMHG | HEART RATE: 93 BPM

## 2021-03-18 DIAGNOSIS — Z09 POSTOP CHECK: Primary | ICD-10-CM

## 2021-03-18 PROCEDURE — 99024 POSTOP FOLLOW-UP VISIT: CPT | Performed by: OTOLARYNGOLOGY

## 2021-03-18 RX ORDER — OMEPRAZOLE 40 MG/1
40 CAPSULE, DELAYED RELEASE ORAL 2 TIMES DAILY
COMMUNITY
Start: 2021-03-15 | End: 2021-12-09 | Stop reason: SDUPTHER

## 2021-03-18 RX ORDER — APIXABAN 2.5 MG/1
TABLET, FILM COATED ORAL 2 TIMES DAILY
COMMUNITY
Start: 2021-03-15

## 2021-03-18 NOTE — PROGRESS NOTES
POST-OP NOTE    Chief Complaint   Patient presents with    Follow-up     post op right ear        HISTORY:     Post right type I tympanoplasty. Patient stated she is doing well. She has had no dizziness, nausea, or vomiting. She has had no pain in the ear. EXAMINATION:        Vitals:    03/18/21 0919   BP: 119/74   Pulse: 93   Temp: 97.8 °F (36.6 °C)      WDWN, awake and alert, with no evidence of distress. Ear:  2 pieces of Gelfoam were removed from the right external auditory canal.  Ciprodex otic suspension was instilled on a cottonball meatal plug placed. Kayleigh Dunbar / Johnny Daniels / Jimbo Gerber / PROCEDURES:   Bob was seen today for follow-up. Diagnoses and all orders for this visit:    Postop check  Comments:  Doing well. RECOMMENDATIONS / PLAN:   1. Bob was advised to instill 4 drops of Ciprodex twice daily into the right ear and allowed to soak in.   2. Return in about 10 days (around 3/28/2021) for recheck/follow-up, and sooner if condition worsens.

## 2021-03-29 ENCOUNTER — OFFICE VISIT (OUTPATIENT)
Dept: ENT CLINIC | Age: 61
End: 2021-03-29

## 2021-03-29 VITALS — SYSTOLIC BLOOD PRESSURE: 113 MMHG | HEART RATE: 93 BPM | DIASTOLIC BLOOD PRESSURE: 82 MMHG | TEMPERATURE: 97.1 F

## 2021-03-29 DIAGNOSIS — Z09 POSTOP CHECK: Primary | ICD-10-CM

## 2021-03-29 PROCEDURE — 99024 POSTOP FOLLOW-UP VISIT: CPT | Performed by: OTOLARYNGOLOGY

## 2021-04-13 ENCOUNTER — IMMUNIZATION (OUTPATIENT)
Dept: PRIMARY CARE CLINIC | Age: 61
End: 2021-04-13
Payer: COMMERCIAL

## 2021-04-13 PROCEDURE — 91301 COVID-19, MODERNA VACCINE 100MCG/0.5ML DOSE: CPT | Performed by: FAMILY MEDICINE

## 2021-04-13 PROCEDURE — 0012A COVID-19, MODERNA VACCINE 100MCG/0.5ML DOSE: CPT | Performed by: FAMILY MEDICINE

## 2021-04-29 ENCOUNTER — OFFICE VISIT (OUTPATIENT)
Dept: ENT CLINIC | Age: 61
End: 2021-04-29

## 2021-04-29 VITALS
HEART RATE: 101 BPM | DIASTOLIC BLOOD PRESSURE: 85 MMHG | TEMPERATURE: 98.3 F | WEIGHT: 225 LBS | BODY MASS INDEX: 29.69 KG/M2 | SYSTOLIC BLOOD PRESSURE: 130 MMHG

## 2021-04-29 DIAGNOSIS — Z86.69: ICD-10-CM

## 2021-04-29 DIAGNOSIS — Z98.890 STATUS POST TYMPANOPLASTY: Primary | ICD-10-CM

## 2021-04-29 PROCEDURE — 99024 POSTOP FOLLOW-UP VISIT: CPT | Performed by: OTOLARYNGOLOGY

## 2021-04-29 NOTE — PROGRESS NOTES
POST-OP NOTE    Chief Complaint   Patient presents with    Post-Op Check     right ear tympanoplasty          HISTORY:     Post right type I tympanoplasty on 3/5/2021. Patient stated that she has been hearing unusual sounds in her ears particularly after application of the eardrops. She denied any ear pain or drainage or bleeding. She feels she possibly hears better than before surgery. EXAMINATION:        Vitals:    04/29/21 0951   BP: 130/85   Pulse: 101   Temp: 98.3 °F (36.8 °C)      WDWN, awake and alert, with no evidence of distress. Right ear: All remaining Gelfoam was removed from the right external auditory canal. The tympanic membrane appears to be intact with healing of the previous perforation. The TM was mobile to pneumatic massage. IMPRESSION / Marileeremigio Mcfarlane / Maco Fischer / PROCEDURES:   Feroz Fitzgreald was seen today for post-op check. Diagnoses and all orders for this visit:    Status post tympanoplasty  -     [facility] Juany [place]. 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 DAVIS, Audiology [facility]       Doing well, with no evidence of complication or problem. RECOMMENDATIONS / PLAN:   1. Audiogram repeat ASAP. 2. Return for any further ENT or sinus problems or symptoms. Orders Placed This Encounter   Procedures   [address] [place] [address] Rd., [address], [place], [place]. [facility]

## 2021-04-30 ENCOUNTER — TELEPHONE (OUTPATIENT)
Dept: ENT CLINIC | Age: 61
End: 2021-04-30

## 2021-04-30 NOTE — TELEPHONE ENCOUNTER
Elpidio Omalley with [employer] called in to say that Haley's coverage is effective from 5/1/2021. We were given the wrong date yesterday and she was confirming that we knew the correct date.      Yi's direct line is: [phone]

## 2021-07-08 ENCOUNTER — HOSPITAL ENCOUNTER (OUTPATIENT)
Dept: MAMMOGRAPHY | Age: 61
Discharge: HOME | End: 2021-07-13
Payer: COMMERCIAL

## 2021-07-08 VITALS — BODY MASS INDEX: 27.09 KG/M2 | WEIGHT: 200 LBS | HEIGHT: 72 IN

## 2021-07-08 DIAGNOSIS — Z12.31 VISIT FOR SCREENING MAMMOGRAM: ICD-10-CM

## 2021-07-08 PROCEDURE — 77067 SCR MAMMO BI INCL CAD: CPT

## 2021-07-14 RX ORDER — ERGOCALCIFEROL 1.25 MG/1
CAPSULE ORAL
Qty: 12 CAPSULE | Refills: 1 | Status: SHIPPED | OUTPATIENT
Start: 2021-07-14 | End: 2022-01-03

## 2021-07-29 DIAGNOSIS — I10 ESSENTIAL HYPERTENSION: ICD-10-CM

## 2021-07-29 RX ORDER — HYDROCHLOROTHIAZIDE 25 MG/1
25 TABLET ORAL EVERY MORNING
Qty: 90 TABLET | Refills: 1 | Status: SHIPPED | OUTPATIENT
Start: 2021-07-29 | End: 2022-01-26

## 2021-12-09 DIAGNOSIS — M79.2 NEUROPATHIC PAIN: ICD-10-CM

## 2021-12-09 RX ORDER — OMEPRAZOLE 40 MG/1
40 CAPSULE, DELAYED RELEASE ORAL 2 TIMES DAILY
Qty: 180 CAPSULE | Refills: 3 | Status: SHIPPED | OUTPATIENT
Start: 2021-12-09

## 2021-12-09 RX ORDER — ROSUVASTATIN CALCIUM 10 MG/1
10 TABLET, COATED ORAL NIGHTLY
Qty: 90 TABLET | Refills: 3 | Status: SHIPPED | OUTPATIENT
Start: 2021-12-09

## 2021-12-09 RX ORDER — DULOXETIN HYDROCHLORIDE 60 MG/1
CAPSULE, DELAYED RELEASE ORAL
Qty: 90 CAPSULE | Refills: 3 | Status: SHIPPED | OUTPATIENT
Start: 2021-12-09

## 2022-01-03 RX ORDER — ERGOCALCIFEROL 1.25 MG/1
CAPSULE ORAL
Qty: 12 CAPSULE | Refills: 1 | Status: SHIPPED | OUTPATIENT
Start: 2022-01-03 | End: 2022-06-27

## 2022-01-18 RX ORDER — ONDANSETRON 4 MG/1
4 TABLET, ORALLY DISINTEGRATING ORAL 3 TIMES DAILY PRN
Qty: 21 TABLET | Refills: 0 | Status: SHIPPED | OUTPATIENT
Start: 2022-01-18

## 2022-01-18 RX ORDER — BENZONATATE 200 MG/1
200 CAPSULE ORAL 3 TIMES DAILY PRN
Qty: 30 CAPSULE | Refills: 0 | Status: SHIPPED | OUTPATIENT
Start: 2022-01-18 | End: 2022-01-25

## 2022-01-26 DIAGNOSIS — I10 ESSENTIAL HYPERTENSION: ICD-10-CM

## 2022-01-26 RX ORDER — HYDROCHLOROTHIAZIDE 25 MG/1
TABLET ORAL
Qty: 90 TABLET | Refills: 1 | Status: SHIPPED | OUTPATIENT
Start: 2022-01-26

## 2022-03-16 ENCOUNTER — TELEPHONE (OUTPATIENT)
Dept: ADMINISTRATIVE | Age: 62
End: 2022-03-16

## 2022-03-16 NOTE — TELEPHONE ENCOUNTER
Submitted PA for DULoxetine HCl 60MG dr capsules [facility] Key: [id number] STATUS: \"This medication or product is on your plan's list of covered drugs. Prior authorization is not required at this time. \"    Please notify patient.  Thank you

## 2022-06-27 RX ORDER — ERGOCALCIFEROL 1.25 MG/1
CAPSULE ORAL
Qty: 12 CAPSULE | Refills: 1 | Status: SHIPPED | OUTPATIENT
Start: 2022-06-27

## 2022-06-27 NOTE — TELEPHONE ENCOUNTER
Medication:   Requested Prescriptions     Pending Prescriptions Disp Refills    vitamin D (ERGOCALCIFEROL) 1.25 MG (09589 UT) CAPS capsule [Pharmacy Med Name: VIT D2 (ERGOCAL) 1.25MG(50,000U) CP] 12 capsule 1     Sig: TAKE ONE CAPSULE BY MOUTH ONCE WEEKLY        Last Filled:      Patient Phone Number: [phone] (home)     Last appt: 3/1/2021   Next appt: Visit date not found    Last OARRS: No flowsheet data found.

## 2022-07-31 DIAGNOSIS — I10 ESSENTIAL HYPERTENSION: ICD-10-CM

## 2022-08-01 RX ORDER — HYDROCHLOROTHIAZIDE 25 MG/1
TABLET ORAL
Qty: 90 TABLET | Refills: 1 | OUTPATIENT
Start: 2022-08-01

## 2023-02-09 ENCOUNTER — NURSE TRIAGE (OUTPATIENT)
Dept: OTHER | Facility: CLINIC | Age: 63
End: 2023-02-09

## 2023-02-09 NOTE — TELEPHONE ENCOUNTER
Location of patient: [address] call from [employer] at [facility] with [employer]. Subjective: Caller states \"I have a pace maker\"     Current Symptoms: Has a pacemaker. In afib right now and cardiologist is aware. They called her about it as she wears a heart monitor. No chest pain or SOB. They have her on Xeralto twice a day. Headaches everyday all day. Migraines also. Chills with the headaches. Onset: several months ago; waxing and waning    Associated Symptoms: NA    Pain Severity: 4/10; pain across her forehead; constant, moderate    Temperature: denies fever     What has been tried: Extra Strength Tylenol     LMP: NA Pregnant: NA    Recommended disposition: See in Office Today/UCC or Walk In as Backup    Care advice provided, patient verbalizes understanding; denies any other questions or concerns; instructed to call back for any new or worsening symptoms. Patient/Caller agrees with recommended disposition; writer provided warm transfer to Tello Mustafa at [facility] for appointment scheduling    Attention Provider: Thank you for allowing me to participate in the care of your patient. The patient was connected to triage in response to information provided to the ECC/PSC. Please do not respond through this encounter as the response is not directed to a shared pool.     Reason for Disposition   Patient wants to be seen    Protocols used: Headache-ADULT-[place]

## 2023-02-27 ENCOUNTER — OFFICE VISIT (OUTPATIENT)
Dept: PRIMARY CARE CLINIC | Age: 63
End: 2023-02-27
Payer: COMMERCIAL

## 2023-02-27 VITALS
HEIGHT: 72 IN | WEIGHT: 205 LBS | TEMPERATURE: 97.4 F | DIASTOLIC BLOOD PRESSURE: 80 MMHG | BODY MASS INDEX: 27.77 KG/M2 | SYSTOLIC BLOOD PRESSURE: 120 MMHG | HEART RATE: 70 BPM | OXYGEN SATURATION: 98 %

## 2023-02-27 DIAGNOSIS — E78.2 MIXED HYPERLIPIDEMIA: ICD-10-CM

## 2023-02-27 DIAGNOSIS — J45.40 MODERATE PERSISTENT ASTHMA WITHOUT COMPLICATION: ICD-10-CM

## 2023-02-27 DIAGNOSIS — Z13.1 ENCOUNTER FOR SCREENING FOR DIABETES MELLITUS: ICD-10-CM

## 2023-02-27 DIAGNOSIS — Z01.419 WELL WOMAN EXAM: ICD-10-CM

## 2023-02-27 DIAGNOSIS — I48.0 PAROXYSMAL A-FIB (HCC): ICD-10-CM

## 2023-02-27 DIAGNOSIS — R51.9 LEFT-SIDED HEADACHE: Primary | ICD-10-CM

## 2023-02-27 DIAGNOSIS — Z86.711 HISTORY OF PULMONARY EMBOLISM: ICD-10-CM

## 2023-02-27 DIAGNOSIS — Z12.31 ENCOUNTER FOR SCREENING MAMMOGRAM FOR BREAST CANCER: ICD-10-CM

## 2023-02-27 DIAGNOSIS — E55.9 VITAMIN D DEFICIENCY: ICD-10-CM

## 2023-02-27 DIAGNOSIS — M79.2 NEUROPATHIC PAIN: ICD-10-CM

## 2023-02-27 DIAGNOSIS — Z12.11 COLON CANCER SCREENING: ICD-10-CM

## 2023-02-27 PROCEDURE — 99214 OFFICE O/P EST MOD 30 MIN: CPT | Performed by: INTERNAL MEDICINE

## 2023-02-27 PROCEDURE — 3074F SYST BP LT 130 MM HG: CPT | Performed by: INTERNAL MEDICINE

## 2023-02-27 PROCEDURE — 3079F DIAST BP 80-89 MM HG: CPT | Performed by: INTERNAL MEDICINE

## 2023-02-27 RX ORDER — ROSUVASTATIN CALCIUM 10 MG/1
10 TABLET, COATED ORAL NIGHTLY
Qty: 90 TABLET | Refills: 3 | Status: SHIPPED | OUTPATIENT
Start: 2023-02-27

## 2023-02-27 RX ORDER — RIVAROXABAN 20 MG/1
TABLET, FILM COATED ORAL
COMMUNITY
Start: 2023-02-20

## 2023-02-27 RX ORDER — ERGOCALCIFEROL 1.25 MG/1
50000 CAPSULE ORAL WEEKLY
Qty: 12 CAPSULE | Refills: 1 | Status: SHIPPED | OUTPATIENT
Start: 2023-02-27

## 2023-02-27 RX ORDER — BUDESONIDE AND FORMOTEROL FUMARATE DIHYDRATE 160; 4.5 UG/1; UG/1
2 AEROSOL RESPIRATORY (INHALATION) 2 TIMES DAILY
Qty: 10.2 G | Refills: 5 | Status: SHIPPED | OUTPATIENT
Start: 2023-02-27

## 2023-02-27 RX ORDER — DULOXETIN HYDROCHLORIDE 60 MG/1
CAPSULE, DELAYED RELEASE ORAL
Qty: 90 CAPSULE | Refills: 3 | Status: SHIPPED | OUTPATIENT
Start: 2023-02-27

## 2023-02-27 SDOH — ECONOMIC STABILITY: FOOD INSECURITY: WITHIN THE PAST 12 MONTHS, YOU WORRIED THAT YOUR FOOD WOULD RUN OUT BEFORE YOU GOT MONEY TO BUY MORE.: NEVER TRUE

## 2023-02-27 SDOH — ECONOMIC STABILITY: HOUSING INSECURITY
IN THE LAST 12 MONTHS, WAS THERE A TIME WHEN YOU DID NOT HAVE A STEADY PLACE TO SLEEP OR SLEPT IN A SHELTER (INCLUDING NOW)?: NO

## 2023-02-27 SDOH — ECONOMIC STABILITY: FOOD INSECURITY: WITHIN THE PAST 12 MONTHS, THE FOOD YOU BOUGHT JUST DIDN'T LAST AND YOU DIDN'T HAVE MONEY TO GET MORE.: NEVER TRUE

## 2023-02-27 SDOH — ECONOMIC STABILITY: INCOME INSECURITY: HOW HARD IS IT FOR YOU TO PAY FOR THE VERY BASICS LIKE FOOD, HOUSING, MEDICAL CARE, AND HEATING?: NOT HARD AT ALL

## 2023-02-27 ASSESSMENT — ENCOUNTER SYMPTOMS
VOICE CHANGE: 0
EYE DISCHARGE: 0
RECTAL PAIN: 0
ABDOMINAL PAIN: 0
DIARRHEA: 0
TROUBLE SWALLOWING: 0
EYE REDNESS: 0
SORE THROAT: 0
STRIDOR: 0
CHEST TIGHTNESS: 0
NAUSEA: 0
ABDOMINAL DISTENTION: 0
SINUS PRESSURE: 0
CHOKING: 0
COUGH: 0
SHORTNESS OF BREATH: 0
PHOTOPHOBIA: 0
APNEA: 0
WHEEZING: 0
EYE PAIN: 0
BACK PAIN: 1
VOMITING: 0
ANAL BLEEDING: 0
BLOOD IN STOOL: 0
CONSTIPATION: 0
EYE ITCHING: 0
RHINORRHEA: 0

## 2023-02-27 ASSESSMENT — PATIENT HEALTH QUESTIONNAIRE - PHQ9
SUM OF ALL RESPONSES TO PHQ9 QUESTIONS 1 & 2: 0
SUM OF ALL RESPONSES TO PHQ QUESTIONS 1-9: 0
2. FEELING DOWN, DEPRESSED OR HOPELESS: 0
1. LITTLE INTEREST OR PLEASURE IN DOING THINGS: 0
SUM OF ALL RESPONSES TO PHQ QUESTIONS 1-9: 0

## 2023-02-27 NOTE — PROGRESS NOTES
Naheed Rowell (:  1960) is a 60 y.o. female,Established patient, here for evaluation of the following chief complaint(s):  Establish Care         ASSESSMENT/PLAN:  1. Left-sided headache without scalp tenderness and no history of trauma. Present for couple months getting worse. Blood pressures normal.  We will further evaluate-     CTA HEAD NECK W WO CONTRAST; Future  -     Sedimentation Rate; Future  -     Vitamin B12; Future  -     Urinalysis with Microscopic; Future  -     CT HEAD W CONTRAST; Future  2. Paroxysmal A-fib [facility]) had cardioversion on 2023 at [facility].  In  patient had pacemaker removed to the right side of the chest because the wire had infiltrated outside of the heart and could not be used. It would cause more problems to remove so it was left in place and disconnected from the pacemaker battery. New pacemaker placed on the right side. Patient has been doing since cardioversion. No more fatigue or dyspnea on exertion and is up-to-date with cardiology follow-up. Review of records showed normal thyroid level in February.  -     TSH with Reflex to FT4; Future  -     Comprehensive Metabolic Panel; Future  -     CBC with Auto Differential; Future  3. Mixed hyperlipidemia refill simvastatin check fasting lipid. -     Lipid Panel; Future  -     CK; Future  -     rosuvastatin (CRESTOR) 10 MG tablet; Take 1 tablet by mouth nightly, Disp-90 tablet, R-3Normal  4. History of pulmonary embolism, since status saddle pulmonary embolus on lifetime Xarelto therapy. 5. Encounter for screening for diabetes mellitus  -     Hemoglobin A1C; Future  6. Vitamin D deficiency  -     Vitamin D 25 Hydroxy; Future  7. Encounter for screening mammogram for breast cancer  -     [facility]ON DIGITAL SCREEN BILATERAL; Future  -     vitamin D (ERGOCALCIFEROL) 1.25 MG (98900 UT) CAPS capsule; Take 1 capsule by mouth once a week, Disp-12 capsule, R-1Normal  8.  Neuropathic pain control with Cymbalta, continue.  -     DULoxetine (CYMBALTA) 60 MG extended release capsule; TAKE ONE CAPSULE BY MOUTH DAILY, Disp-90 capsule, R-3Normal  9. Moderate persistent asthma without complication  -     budesonide-formoterol (SYMBICORT) 160-4.5 MCG/ACT AERO; Inhale 2 puffs into the lungs 2 times daily, Disp-10.2 g, R-5Normal  -     albuterol sulfate (PROAIR RESPICLICK) 620 (90 Base) MCG/ACT aerosol powder inhalation; Inhale 2 puffs into the lungs every 6 hours as needed for Wheezing or Shortness of Breath, Disp-1 each, R-5Normal  10. Colon cancer screening needed but cannot interrupt anticoagulant therapy due to colonoscopy so we will do a Cologuard test.  Last colonoscopy over 10 years ago did not have polyps. -     Fecal DNA Colorectal cancer screening (Cologuard)  11. Well woman exam, needed no Pap for 4 years not having any vaginal bleeding or pain. Refer for well woman exam.  -     Navarro Moreira MD, Gynecology, [facility]      Return in about 3 months (around 5/27/2023). Subjective   SUBJECTIVE/OBJECTIVE:  Headache  Headache pattern:  Some headache always there, and the pain level varies  Duration:  8 to 12 weeks  Date current headache began:  1/2/2023  Age current headache began:  60  Frequency:  Headaches came more frequently then constant pain started  Initial event:  None  Other event details: On chronic anticoagulant  Providers seen:  None  Lifetime total:  20+  Longest time without a headache:  Months  Number of ER visits for headache:  0  Number of hospitalizations for headaches:  0  ADL impact frequency:  Daily  Time of day symptoms are worse:  No specific time of day  Do headaches wake patient from sleep?: Yes    Days of the week symptoms are worse:  No specific day of the week  Season symptoms are worse:  No particular season  Pain quality: aching.   Laterality:  Left side only  Pain severity:  8  Escalation timing:  Wakes up with severe pain  Aggravating factors:  None  Other factors: Need to rule out sleep apnea  Changes in thinking and mood:  Not feeling right and fatigue  Changes in vision:  Blurred vision  Bilateral symptoms:  None  Unilateral symptoms:  None  Stomach/GI changes:  Nausea  Changes in sensation:  Lightheadedness  Abortive medication frequency of use:  Less than 1 day a week  Abortive medications tried:  Acetaminophen  Other abortive medications:  Can not take nsaids due to long term xarelto therapy  Vitamins and supplements tried:  None  Hyperlipidemia  This is a chronic problem. The current episode started more than 1 year ago. The problem is controlled (out of crestor for two weeks, because could not get a refill until apt.). Pertinent negatives include no chest pain, myalgias or shortness of breath. Current antihyperlipidemic treatment includes statins. The current treatment provides significant improvement of lipids. Risk factors for coronary artery disease include dyslipidemia, hypertension, post-menopausal and obesity. Review of Systems   Constitutional: Negative. Negative for chills, diaphoresis, fatigue and fever. Night sweats. HENT:  Negative for congestion, dental problem, drooling, ear discharge, ear pain, hearing loss, postnasal drip, rhinorrhea, sinus pressure, sore throat, tinnitus, trouble swallowing and voice change. Hoarseness resolved    Goiter stable per check 2017. Eyes:  Negative for photophobia, pain, discharge, redness, itching and visual disturbance. Wears glasses. Tested two years ago. No glaucoma. Some floaters, but sees well with glasses. Respiratory:  Negative for apnea, cough, choking, chest tightness, shortness of breath, wheezing and stridor. MAGGIE on bi pap and followed by Barbara Rosas for one year. Able to walk up a flight a stairs. Asthma stable on Symbicort. .   Cardiovascular:  Negative for chest pain, palpitations and leg swelling.         Pacemaker September 28, 1998 and replaced 12/12/2007, and 16.  [facility] manages by Dr. Denilson Toro. Rib fracture pain is slowly resolving. Paroxymal a fib with cardioversion on 23 and lifetime xarelto. Please refer to Op Note in Chart Review for additional procedural details. Performed Procedures  PACEMAKER IMPLANT PERMANENT SINGLE  Exam End: 22 17:22 Last Resulted: 22 17:38  Received From: [facility]  Result Received: 23 12:53       Gastrointestinal:  Negative for abdominal distention, abdominal pain, anal bleeding, blood in stool, constipation, diarrhea, nausea, rectal pain and vomiting. Ventra hernia ruq and pain with lifting. Patient has pain with lifting. GERD not relieved on protonix bid. NO dysphagia. Endocrine:        Prediabetes history. Genitourinary:  Negative for decreased urine volume, dysuria, genital sores, hematuria, menstrual problem, pelvic pain, urgency, vaginal bleeding and vaginal discharge. Menarche at age 12. Age of first child age 29      No breast feeding. Paternal aunt with breast cancer at age 27. Maternal aunt and maternal cousin with ovarian cancer. Saw GYN,  Dr. Lesley Connolly in 2016. Mammogram in 2016. Menopause at age 36. History of uterine dysplasia and had endometrial biopsy. Musculoskeletal:  Positive for back pain. Negative for arthralgias, joint swelling, myalgias and neck pain. Sciatiac to left thigh and knee    Skin: Negative. Negative for rash. Status post removal of fungal great nail on 17 and is healing without problems and is completing a course of lamisil. Allergic/Immunologic: Positive for environmental allergies. Negative for food allergies and immunocompromised state. Neurological:  Positive for headaches. Negative for dizziness, tremors, seizures, syncope, weakness and numbness. Migraines since age 14. Starts with aura and dizziness.  It switches sides, but more frequently on the left.  Headaches are more frequent and more severe. One to two per week. Hematological:  Negative for adenopathy. Bruises/bleeds easily. 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 Coumadin Oct 1998 to  January of 2008. Pulmonary embolus 2019 on lifetime anticoagulation, unprovoked. Psychiatric/Behavioral: Negative. Objective   Physical Exam  Constitutional:       General: She is not in acute distress. Appearance: She is well-developed. She is not diaphoretic. HENT:      Head: Normocephalic and atraumatic. Right Ear: External ear normal.      Left Ear: External ear normal.   Eyes:      General: No scleral icterus. Right eye: No discharge. Left eye: No discharge. Conjunctiva/sclera: Conjunctivae normal.      Pupils: Pupils are equal, round, and reactive to light. Neck:      Thyroid: No thyromegaly. Vascular: No JVD. Trachea: No tracheal deviation. Cardiovascular:      Rate and Rhythm: Normal rate. Heart sounds: Normal heart sounds. No murmur heard. No gallop. Pulmonary:      Effort: Pulmonary effort is normal. No respiratory distress. Breath sounds: Normal breath sounds. No wheezing or rales. Chest:      Chest wall: No tenderness. Abdominal:      General: Bowel sounds are normal. There is no distension. Palpations: Abdomen is soft. There is no mass. Tenderness: There is no abdominal tenderness. There is no guarding or rebound. Comments: ruq tenderness. Musculoskeletal:         General: No tenderness. Normal range of motion. Cervical back: Normal range of motion and neck supple. Right lower leg: No edema. Left lower leg: No edema. Comments:   Good dp pulses. Lymphadenopathy:      Cervical: No cervical adenopathy. Skin:     General: Skin is warm and dry. Coloration: Skin is not pale. Findings: No erythema or rash.    Neurological:      General: No focal deficit present. Mental Status: She is alert and oriented to person, place, and time. Cranial Nerves: No cranial nerve deficit. Sensory: No sensory deficit. Motor: No abnormal muscle tone. Coordination: Coordination normal.      Deep Tendon Reflexes: Reflexes are normal and symmetric. Reflexes normal.   Psychiatric:         Mood and Affect: Mood normal.         Behavior: Behavior normal.         Thought Content: Thought content normal.         Judgment: Judgment normal.              An electronic signature was used to authenticate this note.     --Lio Kaiser MD

## 2023-03-01 DIAGNOSIS — Z13.1 ENCOUNTER FOR SCREENING FOR DIABETES MELLITUS: ICD-10-CM

## 2023-03-01 DIAGNOSIS — E78.2 MIXED HYPERLIPIDEMIA: ICD-10-CM

## 2023-03-01 DIAGNOSIS — E55.9 VITAMIN D DEFICIENCY: ICD-10-CM

## 2023-03-01 DIAGNOSIS — R51.9 LEFT-SIDED HEADACHE: ICD-10-CM

## 2023-03-01 DIAGNOSIS — I48.0 PAROXYSMAL A-FIB (HCC): ICD-10-CM

## 2023-03-01 LAB
A/G RATIO: 1.5 (ref 1.1–2.2)
ALBUMIN SERPL-MCNC: 4.2 G/DL (ref 3.4–5)
ALP BLD-CCNC: 138 U/L (ref 40–129)
ALT SERPL-CCNC: 20 U/L (ref 10–40)
ANION GAP SERPL CALCULATED.3IONS-SCNC: 16 MMOL/L (ref 3–16)
AST SERPL-CCNC: 13 U/L (ref 15–37)
BACTERIA: ABNORMAL /HPF
BASOPHILS ABSOLUTE: 0 K/UL (ref 0–0.2)
BASOPHILS RELATIVE PERCENT: 0.6 %
BILIRUB SERPL-MCNC: <0.2 MG/DL (ref 0–1)
BILIRUBIN URINE: NEGATIVE
BLOOD, URINE: NEGATIVE
BUN BLDV-MCNC: 29 MG/DL (ref 7–20)
CALCIUM SERPL-MCNC: 9.8 MG/DL (ref 8.3–10.6)
CHLORIDE BLD-SCNC: 105 MMOL/L (ref 99–110)
CHOLESTEROL, TOTAL: 234 MG/DL (ref 0–199)
CLARITY: CLEAR
CO2: 23 MMOL/L (ref 21–32)
COLOR: YELLOW
CREAT SERPL-MCNC: 0.8 MG/DL (ref 0.6–1.2)
EOSINOPHILS ABSOLUTE: 0.2 K/UL (ref 0–0.6)
EOSINOPHILS RELATIVE PERCENT: 3.5 %
EPITHELIAL CELLS, UA: 1 /HPF (ref 0–5)
GFR SERPL CREATININE-BSD FRML MDRD: >60 ML/MIN/{1.73_M2}
GLUCOSE BLD-MCNC: 92 MG/DL (ref 70–99)
GLUCOSE URINE: NEGATIVE MG/DL
HCT VFR BLD CALC: 37.8 % (ref 36–48)
HDLC SERPL-MCNC: 62 MG/DL (ref 40–60)
HEMOGLOBIN: 12.3 G/DL (ref 12–16)
HYALINE CASTS: 0 /LPF (ref 0–8)
KETONES, URINE: NEGATIVE MG/DL
LDL CHOLESTEROL CALCULATED: 153 MG/DL
LEUKOCYTE ESTERASE, URINE: ABNORMAL
LYMPHOCYTES ABSOLUTE: 2 K/UL (ref 1–5.1)
LYMPHOCYTES RELATIVE PERCENT: 35.1 %
MCH RBC QN AUTO: 28.4 PG (ref 26–34)
MCHC RBC AUTO-ENTMCNC: 32.6 G/DL (ref 31–36)
MCV RBC AUTO: 87.1 FL (ref 80–100)
MICROSCOPIC EXAMINATION: YES
MONOCYTES ABSOLUTE: 0.3 K/UL (ref 0–1.3)
MONOCYTES RELATIVE PERCENT: 5.4 %
NEUTROPHILS ABSOLUTE: 3.1 K/UL (ref 1.7–7.7)
NEUTROPHILS RELATIVE PERCENT: 55.4 %
NITRITE, URINE: NEGATIVE
PDW BLD-RTO: 15.8 % (ref 12.4–15.4)
PH UA: 5.5 (ref 5–8)
PLATELET # BLD: 291 K/UL (ref 135–450)
PMV BLD AUTO: 8.3 FL (ref 5–10.5)
POTASSIUM SERPL-SCNC: 5 MMOL/L (ref 3.5–5.1)
PROTEIN UA: NEGATIVE MG/DL
RBC # BLD: 4.33 M/UL (ref 4–5.2)
RBC UA: 0 /HPF (ref 0–4)
SEDIMENTATION RATE, ERYTHROCYTE: 44 MM/HR (ref 0–30)
SODIUM BLD-SCNC: 144 MMOL/L (ref 136–145)
SPECIFIC GRAVITY UA: 1.02 (ref 1–1.03)
TOTAL CK: 32 U/L (ref 26–192)
TOTAL PROTEIN: 7 G/DL (ref 6.4–8.2)
TRIGL SERPL-MCNC: 97 MG/DL (ref 0–150)
TSH REFLEX FT4: 1.89 UIU/ML (ref 0.27–4.2)
URINE TYPE: ABNORMAL
UROBILINOGEN, URINE: 0.2 E.U./DL
VITAMIN B-12: 935 PG/ML (ref 211–911)
VITAMIN D 25-HYDROXY: 70.1 NG/ML
VLDLC SERPL CALC-MCNC: 19 MG/DL
WBC # BLD: 5.6 K/UL (ref 4–11)
WBC UA: 0 /HPF (ref 0–5)

## 2023-03-02 LAB
ESTIMATED AVERAGE GLUCOSE: 111.2 MG/DL
HBA1C MFR BLD: 5.5 %

## 2023-03-09 RX ORDER — FLECAINIDE ACETATE 50 MG/1
TABLET ORAL
COMMUNITY
Start: 2023-03-06

## 2023-03-09 RX ORDER — GABAPENTIN 300 MG/1
1 CAPSULE ORAL
COMMUNITY

## 2023-03-13 ENCOUNTER — HOSPITAL ENCOUNTER (OUTPATIENT)
Dept: CT IMAGING | Age: 63
Discharge: HOME | End: 2023-03-13
Payer: COMMERCIAL

## 2023-03-13 DIAGNOSIS — R51.9 LEFT-SIDED HEADACHE: ICD-10-CM

## 2023-03-13 PROCEDURE — 70498 CT ANGIOGRAPHY NECK: CPT

## 2023-03-13 PROCEDURE — 70460 CT HEAD/BRAIN W/DYE: CPT

## 2023-03-13 PROCEDURE — 6360000004 HC RX CONTRAST MEDICATION: Performed by: INTERNAL MEDICINE

## 2023-03-13 RX ADMIN — IOPAMIDOL 75 ML: 755 INJECTION, SOLUTION INTRAVENOUS at 15:42

## 2023-03-14 NOTE — RESULT ENCOUNTER NOTE
The CT of the head did not show any strokes, no hydrocephalus.  Very mild small vessel disease that we are treating with cholesterol lowering medication.

## 2023-03-16 DIAGNOSIS — R19.5 POSITIVE COLORECTAL CANCER SCREENING USING COLOGUARD TEST: Primary | ICD-10-CM

## 2023-03-16 DIAGNOSIS — K21.00 GASTROESOPHAGEAL REFLUX DISEASE WITH ESOPHAGITIS WITHOUT HEMORRHAGE: ICD-10-CM

## 2023-03-17 DIAGNOSIS — R19.5 POSITIVE COLORECTAL CANCER SCREENING USING COLOGUARD TEST: ICD-10-CM

## 2023-03-17 DIAGNOSIS — K21.00 GASTROESOPHAGEAL REFLUX DISEASE WITH ESOPHAGITIS WITHOUT HEMORRHAGE: ICD-10-CM

## 2023-03-17 DIAGNOSIS — I48.0 PAROXYSMAL A-FIB (HCC): Primary | ICD-10-CM

## 2023-05-12 ENCOUNTER — ANESTHESIA EVENT (OUTPATIENT)
Dept: ENDOSCOPY | Age: 63
End: 2023-05-12
Payer: COMMERCIAL

## 2023-05-15 ENCOUNTER — ANESTHESIA (OUTPATIENT)
Dept: ENDOSCOPY | Age: 63
End: 2023-05-15
Payer: COMMERCIAL

## 2023-05-15 ENCOUNTER — HOSPITAL ENCOUNTER (OUTPATIENT)
Age: 63
Setting detail: OUTPATIENT SURGERY
Discharge: HOME | End: 2023-05-15
Attending: INTERNAL MEDICINE | Admitting: INTERNAL MEDICINE
Payer: COMMERCIAL

## 2023-05-15 VITALS
WEIGHT: 218.48 LBS | TEMPERATURE: 97.3 F | HEART RATE: 62 BPM | RESPIRATION RATE: 20 BRPM | HEIGHT: 72 IN | BODY MASS INDEX: 29.59 KG/M2 | SYSTOLIC BLOOD PRESSURE: 117 MMHG | OXYGEN SATURATION: 96 % | DIASTOLIC BLOOD PRESSURE: 67 MMHG

## 2023-05-15 DIAGNOSIS — R19.5 POSITIVE COLORECTAL CANCER SCREENING USING DNA-BASED STOOL TEST: ICD-10-CM

## 2023-05-15 DIAGNOSIS — R13.10 DYSPHAGIA, UNSPECIFIED TYPE: ICD-10-CM

## 2023-05-15 PROCEDURE — 3700000000 HC ANESTHESIA ATTENDED CARE: Performed by: INTERNAL MEDICINE

## 2023-05-15 PROCEDURE — 3609017700 HC EGD DILATION GASTRIC/DUODENAL STRICTURE: Performed by: INTERNAL MEDICINE

## 2023-05-15 PROCEDURE — 2580000003 HC RX 258: Performed by: ANESTHESIOLOGY

## 2023-05-15 PROCEDURE — 3609010600 HC COLONOSCOPY POLYPECTOMY SNARE/COLD BIOPSY: Performed by: INTERNAL MEDICINE

## 2023-05-15 PROCEDURE — 7100000000 HC PACU RECOVERY - FIRST 15 MIN: Performed by: INTERNAL MEDICINE

## 2023-05-15 PROCEDURE — 88305 TISSUE EXAM BY PATHOLOGIST: CPT

## 2023-05-15 PROCEDURE — 2500000003 HC RX 250 WO HCPCS: Performed by: NURSE ANESTHETIST, CERTIFIED REGISTERED

## 2023-05-15 PROCEDURE — 7100000001 HC PACU RECOVERY - ADDTL 15 MIN: Performed by: INTERNAL MEDICINE

## 2023-05-15 PROCEDURE — 6360000002 HC RX W HCPCS: Performed by: NURSE ANESTHETIST, CERTIFIED REGISTERED

## 2023-05-15 PROCEDURE — 7100000010 HC PHASE II RECOVERY - FIRST 15 MIN: Performed by: INTERNAL MEDICINE

## 2023-05-15 PROCEDURE — 3700000001 HC ADD 15 MINUTES (ANESTHESIA): Performed by: INTERNAL MEDICINE

## 2023-05-15 PROCEDURE — 7100000011 HC PHASE II RECOVERY - ADDTL 15 MIN: Performed by: INTERNAL MEDICINE

## 2023-05-15 PROCEDURE — 6360000002 HC RX W HCPCS: Performed by: STUDENT IN AN ORGANIZED HEALTH CARE EDUCATION/TRAINING PROGRAM

## 2023-05-15 PROCEDURE — 2709999900 HC NON-CHARGEABLE SUPPLY: Performed by: INTERNAL MEDICINE

## 2023-05-15 PROCEDURE — C1726 CATH, BAL DIL, NON-VASCULAR: HCPCS | Performed by: INTERNAL MEDICINE

## 2023-05-15 RX ORDER — ONDANSETRON 2 MG/ML
4 INJECTION INTRAMUSCULAR; INTRAVENOUS ONCE
Status: COMPLETED | OUTPATIENT
Start: 2023-05-15 | End: 2023-05-15

## 2023-05-15 RX ORDER — SODIUM CHLORIDE 0.9 % (FLUSH) 0.9 %
5-40 SYRINGE (ML) INJECTION PRN
Status: DISCONTINUED | OUTPATIENT
Start: 2023-05-15 | End: 2023-05-15 | Stop reason: HOSPADM

## 2023-05-15 RX ORDER — SODIUM CHLORIDE 9 MG/ML
INJECTION, SOLUTION INTRAVENOUS PRN
Status: DISCONTINUED | OUTPATIENT
Start: 2023-05-15 | End: 2023-05-15 | Stop reason: HOSPADM

## 2023-05-15 RX ORDER — PHENYLEPHRINE HCL IN 0.9% NACL 1 MG/10 ML
SYRINGE (ML) INTRAVENOUS PRN
Status: DISCONTINUED | OUTPATIENT
Start: 2023-05-15 | End: 2023-05-15 | Stop reason: SDUPTHER

## 2023-05-15 RX ORDER — PROPOFOL 10 MG/ML
INJECTION, EMULSION INTRAVENOUS CONTINUOUS PRN
Status: DISCONTINUED | OUTPATIENT
Start: 2023-05-15 | End: 2023-05-15 | Stop reason: SDUPTHER

## 2023-05-15 RX ORDER — SODIUM CHLORIDE 0.9 % (FLUSH) 0.9 %
5-40 SYRINGE (ML) INJECTION EVERY 12 HOURS SCHEDULED
Status: DISCONTINUED | OUTPATIENT
Start: 2023-05-15 | End: 2023-05-15 | Stop reason: HOSPADM

## 2023-05-15 RX ORDER — PROPOFOL 10 MG/ML
INJECTION, EMULSION INTRAVENOUS PRN
Status: DISCONTINUED | OUTPATIENT
Start: 2023-05-15 | End: 2023-05-15 | Stop reason: SDUPTHER

## 2023-05-15 RX ADMIN — ONDANSETRON 4 MG: 2 INJECTION INTRAMUSCULAR; INTRAVENOUS at 08:14

## 2023-05-15 RX ADMIN — Medication 100 MCG: at 09:18

## 2023-05-15 RX ADMIN — PROPOFOL 180 MCG/KG/MIN: 10 INJECTION, EMULSION INTRAVENOUS at 08:59

## 2023-05-15 RX ADMIN — PROPOFOL 100 MG: 10 INJECTION, EMULSION INTRAVENOUS at 08:59

## 2023-05-15 RX ADMIN — Medication 100 MCG: at 09:11

## 2023-05-15 RX ADMIN — SODIUM CHLORIDE: 9 INJECTION, SOLUTION INTRAVENOUS at 08:54

## 2023-05-15 ASSESSMENT — LIFESTYLE VARIABLES: SMOKING_STATUS: 0

## 2023-05-15 ASSESSMENT — PAIN DESCRIPTION - ORIENTATION: ORIENTATION: ANTERIOR

## 2023-05-15 ASSESSMENT — PAIN DESCRIPTION - ONSET: ONSET: SUDDEN

## 2023-05-15 ASSESSMENT — PAIN DESCRIPTION - DESCRIPTORS: DESCRIPTORS: ACHING

## 2023-05-15 ASSESSMENT — PAIN SCALES - GENERAL
PAINLEVEL_OUTOF10: 6
PAINLEVEL_OUTOF10: 0

## 2023-05-15 ASSESSMENT — PAIN DESCRIPTION - LOCATION: LOCATION: HEAD

## 2023-05-15 ASSESSMENT — PAIN DESCRIPTION - FREQUENCY: FREQUENCY: CONTINUOUS

## 2023-05-15 ASSESSMENT — PAIN DESCRIPTION - PAIN TYPE: TYPE: ACUTE PAIN

## 2023-05-15 NOTE — ANESTHESIA POSTPROCEDURE EVALUATION
Department of Anesthesiology  Postprocedure Note    Patient: Camila Keys  MRN: [id number]  YOB: 1960  Date of evaluation: 5/15/2023      Procedure Summary     Date: 05/15/23 Room / Location: [address] [place] [address]    Anesthesia Start: 2071 Anesthesia Stop: 7366    Procedures:       EGD DILATION BALLOON      COLONOSCOPY POLYPECTOMY SNARE/COLD BIOPSY Diagnosis:       Dysphagia, unspecified type      Positive colorectal cancer screening using DNA-based stool test      (DYSPHAGIA, POSITIVE COLOGUARD)    Surgeons: Elizabeth Dixon MD Responsible Provider: Stefani Villatoro MD    Anesthesia Type: MAC ASA Status: 3          Anesthesia Type: MAC    Yaz Phase I: Yaz Score: 10    Yaz Phase II: Yaz Score: 10      Anesthesia Post Evaluation    Patient location during evaluation: PACU  Patient participation: complete - patient participated  Level of consciousness: awake  Airway patency: patent  Nausea & Vomiting: no nausea and no vomiting  Complications: no  Cardiovascular status: hemodynamically stable and blood pressure returned to baseline  Respiratory status: spontaneous ventilation, nonlabored ventilation and room air  Hydration status: stable  Comments: Ms. Kimberly Tyson was seen resting comfortably following her procedure. Appropriate for discharge home with .

## 2023-05-15 NOTE — DISCHARGE INSTRUCTIONS
Recommendations:   1) Await pathology results   2) Recommend resuming Xarelto in 24 hours. 3) Continue on Omeprazole as ordered. Make sure to take 30 minutes prior to meals. 4) Recommend repeat colonoscopy in 5 years for surveillance purposes. Patient not a candida for future Cologuards. 5) Results will be posted to the [facility] patient portal in 7-10 days. Discharge Instructions for Colonoscopy     Colonoscopy is a visual exam of the lining of the large intestine, also called the bowel or colon, with a colonoscope. A colonoscope is a flexible tube with a light and a viewing device. It allows the doctor to view the inside of the colon through a tiny video camera. Colonoscopy is performed for many reasons: unexplained anemia , pain, diarrhea , bloody stools, cancer screening, among many other reasons. Complications from a colonoscopy are rare. Some possible serious complications include perforated bowel (which might require surgery) and bleeding (which could require blood transfusion ). Minor complications include bloating, gas, and cramping that can last for 1-2 days after the procedure. Because air is put into your colon during the procedure, it is normal to pass large amounts of air from your rectum. You may not have a bowel movement for 1-3 days after the procedure. What You Will Need:  Someone to drive you home after the procedure     Steps to Take:  [address] when you get home. Because the sedative will make you drowsy, don't drive, operate machinery, or make important decisions the day of the procedure. Feelings of bloating, gas, or cramping may persist for 24 hours. Diet -  Try sips of water first. If tolerated, resume bland food (scrambled eggs, toast, soup) first.  If tolerated, resume regular diet or the diet recommended by your physician. Do not drink alcohol for 24 hours. Physical Activity -  Ask your doctor when you will be able to return to work.    Do not

## 2023-05-15 NOTE — ANESTHESIA PRE PROCEDURE
Department of Anesthesiology  Preprocedure Note       Name:  Sonali Muniz   Age:  58 y.o.  :  1960                                          MRN:  [id number]         Date:  5/15/2023      Surgeon: Carlos Melendez):  Alona Eller MD    Procedure: Procedure(s):  ESOPHAGOGASTRODUODENOSCOPY  COLONOSCOPY    Medications prior to admission:   Prior to Admission medications    Medication Sig Start Date End Date Taking?  Authorizing Provider   rivaroxaban (XARELTO) 20 MG TABS tablet Take 1 tablet by mouth nightly 23   Historical Provider, MD   DULoxetine (CYMBALTA) 60 MG extended release capsule TAKE ONE CAPSULE BY MOUTH DAILY 23   David Iyer MD   rosuvastatin (CRESTOR) 10 MG tablet Take 1 tablet by mouth nightly 23   David Iyer MD   vitamin D (ERGOCALCIFEROL) 1.25 MG (43624 UT) CAPS capsule Take 1 capsule by mouth once a week 23   David Iyer MD   budesonide-formoterol [facility]) 160-4.5 MCG/ACT AERO Inhale 2 puffs into the lungs 2 times daily  Patient taking differently: Inhale 2 puffs into the lungs 2 times daily as needed 23   David Iyer MD   albuterol sulfate (PROAIR RESPICLICK) 991 (90 Base) MCG/ACT aerosol powder inhalation Inhale 2 puffs into the lungs every 6 hours as needed for Wheezing or Shortness of Breath 23   David Iyer MD   hydroCHLOROthiazide (HYDRODIURIL) 25 MG tablet TAKE ONE TABLET BY MOUTH EVERY MORNING 22   Cristino David MD   omeprazole (PRILOSEC) 40 MG delayed release capsule Take 1 capsule by mouth 2 times daily  Patient taking differently: Take 1 capsule by mouth daily 21   Cristino David MD   acetaminophen (TYLENOL) 500 MG tablet Take 2 tablets by mouth every 6 hours as needed for Pain    Historical Provider, MD   fluticasone (FLONASE) 50 MCG/ACT nasal spray 1 spray by Each Nostril route daily as needed    Historical Provider, MD   b complex vitamins capsule Take 1 capsule by mouth

## 2023-05-18 DIAGNOSIS — R51.9 LEFT-SIDED HEADACHE: Primary | ICD-10-CM

## 2023-05-18 PROBLEM — I48.92 ATRIAL FLUTTER (HCC): Status: ACTIVE | Noted: 2023-03-23

## 2023-05-18 RX ORDER — TOPIRAMATE 25 MG/1
25 TABLET ORAL 2 TIMES DAILY
Qty: 60 TABLET | Refills: 5 | Status: SHIPPED | OUTPATIENT
Start: 2023-05-18

## 2023-05-18 RX ORDER — OFLOXACIN 3 MG/ML
SOLUTION AURICULAR (OTIC)
COMMUNITY
Start: 2023-04-03

## 2023-05-18 NOTE — PROGRESS NOTES
I tried to call just to get more information. The CT angiogram did not show any aneurysms or masses or hydrocephalus. I am sending you to a neurologist.  Temporal arteritis which is inflammation of the arteries can cause severe headaches and needs to be treated with steroids. Usually with this it hurts when you chew or hurts when you touch your scalp. Usually with this the sedimentation rate is markedly elevated. Your rate was 44 normals 30. Go to the lab today or to our office to get a nonfasting sed rate. I placed the orders. Make appointment to come in and also I gave you a referral to see a neurologist and started Topamax 25 mg twice a day for the headache. Get the sed rate right away and if you do not see the results call for results I can make sure I see it early on and the results are okay. For the neck x-ray orders are there she can go anytime and they will look him up in the computer and do that. You do not have to make an appointment for the neck x-ray or the blood work in office.   I am happy to report our lab is back Monday through Friday

## 2023-05-19 DIAGNOSIS — R51.9 LEFT-SIDED HEADACHE: ICD-10-CM

## 2023-05-19 LAB — ERYTHROCYTE [SEDIMENTATION RATE] IN BLOOD BY WESTERGREN METHOD: 45 MM/HR (ref 0–30)

## 2023-06-01 PROBLEM — R70.0 ELEVATED SEDIMENTATION RATE: Status: ACTIVE | Noted: 2023-06-01

## 2023-06-29 ENCOUNTER — APPOINTMENT (OUTPATIENT)
Dept: CT IMAGING | Age: 63
End: 2023-06-29
Payer: COMMERCIAL

## 2023-06-29 ENCOUNTER — APPOINTMENT (OUTPATIENT)
Dept: GENERAL RADIOLOGY | Age: 63
End: 2023-06-29
Payer: COMMERCIAL

## 2023-06-29 ENCOUNTER — HOSPITAL ENCOUNTER (EMERGENCY)
Age: 63
Discharge: HOME | End: 2023-06-29
Payer: COMMERCIAL

## 2023-06-29 VITALS
WEIGHT: 218 LBS | OXYGEN SATURATION: 98 % | RESPIRATION RATE: 16 BRPM | BODY MASS INDEX: 29.53 KG/M2 | HEIGHT: 72 IN | DIASTOLIC BLOOD PRESSURE: 78 MMHG | SYSTOLIC BLOOD PRESSURE: 153 MMHG | TEMPERATURE: 98.2 F | HEART RATE: 68 BPM

## 2023-06-29 DIAGNOSIS — S09.90XA CLOSED HEAD INJURY, INITIAL ENCOUNTER: ICD-10-CM
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
## 2023-06-29 DIAGNOSIS — S92.414A CLOSED NONDISPLACED FRACTURE OF PROXIMAL PHALANX OF RIGHT GREAT TOE, INITIAL ENCOUNTER: Primary | ICD-10-CM

## 2023-06-29 PROCEDURE — 99284 EMERGENCY DEPT VISIT MOD MDM: CPT

## 2023-06-29 PROCEDURE — 73590 X-RAY EXAM OF LOWER LEG: CPT

## 2023-06-29 PROCEDURE — 70450 CT HEAD/BRAIN W/O DYE: CPT

## 2023-06-29 PROCEDURE — 6370000000 HC RX 637 (ALT 250 FOR IP): Performed by: PHYSICIAN ASSISTANT

## 2023-06-29 PROCEDURE — 73630 X-RAY EXAM OF FOOT: CPT

## 2023-06-29 PROCEDURE — 72125 CT NECK SPINE W/O DYE: CPT

## 2023-06-29 RX ORDER — ACETAMINOPHEN 500 MG
1000 TABLET ORAL ONCE
Status: COMPLETED | OUTPATIENT
Start: 2023-06-29 | End: 2023-06-29

## 2023-06-29 RX ADMIN — ACETAMINOPHEN 1000 MG: 500 TABLET ORAL at 15:43

## 2023-06-29 ASSESSMENT — PAIN DESCRIPTION - LOCATION
LOCATION: TOE (COMMENT WHICH ONE)
LOCATION: HEAD

## 2023-06-29 ASSESSMENT — PAIN DESCRIPTION - DESCRIPTORS
DESCRIPTORS: ACHING
DESCRIPTORS: ACHING

## 2023-06-29 ASSESSMENT — PAIN DESCRIPTION - ORIENTATION: ORIENTATION: RIGHT

## 2023-06-29 ASSESSMENT — PAIN DESCRIPTION - FREQUENCY: FREQUENCY: CONTINUOUS

## 2023-06-29 ASSESSMENT — PAIN DESCRIPTION - ONSET: ONSET: ON-GOING

## 2023-06-29 ASSESSMENT — PAIN - FUNCTIONAL ASSESSMENT: PAIN_FUNCTIONAL_ASSESSMENT: 0-10

## 2023-06-29 ASSESSMENT — PAIN SCALES - GENERAL
PAINLEVEL_OUTOF10: 8
PAINLEVEL_OUTOF10: 8

## 2023-06-29 ASSESSMENT — PAIN DESCRIPTION - PAIN TYPE: TYPE: ACUTE PAIN

## 2023-07-12 ENCOUNTER — OFFICE VISIT (OUTPATIENT)
Dept: ORTHOPEDIC SURGERY | Age: 63
End: 2023-07-12
Payer: COMMERCIAL

## 2023-07-12 VITALS — BODY MASS INDEX: 29.53 KG/M2 | HEIGHT: 72 IN | RESPIRATION RATE: 18 BRPM | WEIGHT: 218 LBS

## 2023-07-12 DIAGNOSIS — S92.414A CLOSED NONDISPLACED FRACTURE OF PROXIMAL PHALANX OF RIGHT GREAT TOE, INITIAL ENCOUNTER: ICD-10-CM

## 2023-07-12 DIAGNOSIS — S99.921A RIGHT FOOT INJURY, INITIAL ENCOUNTER: Primary | ICD-10-CM

## 2023-07-12 PROCEDURE — 99203 OFFICE O/P NEW LOW 30 MIN: CPT | Performed by: PHYSICIAN ASSISTANT

## 2023-07-12 NOTE — PROGRESS NOTES
Subjective:      Patient ID: Alton Hannon is a 58 y.o. female who is here for an initial evaluation of right foot/great toe pain. [facility] HPI:   Tripped over a weed eater injuring her right foot on 6/29/2022. Presented to the ER on 6/29/2022 where she was diagnosed with a nondisplaced fracture proximal phalanx right great toe. She was placed in a postop shoe. She is here today for ER follow-up. Pain Scale 7/10 VAS. Location of pain over the medial aspect of the right great toe and foot. Pain is worse with weightbearing. Pain improves with elevation. Previous treatments have included ice and Tylenol. Review of Systems:  I have reviewed the clinically relevant past medical history, medications, allergies, family history, social history, and 13 point Review of Systems from the patient's recent history form & documented any details relevant to today's presenting complaints in the history above. The patient's self-reported past medical history, medications, allergies, family history, social history, and Review of Systems form from today's date have been scanned into the chart under the \"Media\" tab. As outlined in the HPI. Negative for poly-joint pain, swelling and stiffness. Negative numbness or tingling into the affected extremity.      Past Medical History:   Diagnosis Date    Bradycardia     pacemaker-medtronic    Bronchitis     Cancer ([address]) 2009    BCC Rt leg,squamous cell,LEFT LEG,FACE-BASAL CELL    Classic migraine     Degenerated intervertebral disc     GERD (gastroesophageal reflux disease)     History of stomach ulcers     Hx of blood clots     neck,arm after first pacemaker inserted    Hypercholesterolemia     IBS (irritable bowel syndrome)     Nausea & vomiting     Neuropathy     Osteoporosis     Patent foramen ovale     surgery as child    Peripheral vascular disease ([address])     PONV (postoperative nausea and vomiting)     family hx also of post op n/v    Pulmonary embolism ([address]) 2019

## 2023-08-08 ENCOUNTER — OFFICE VISIT (OUTPATIENT)
Dept: ORTHOPEDIC SURGERY | Age: 63
End: 2023-08-08
Payer: COMMERCIAL

## 2023-08-08 VITALS — WEIGHT: 218 LBS | BODY MASS INDEX: 29.53 KG/M2 | HEIGHT: 72 IN | RESPIRATION RATE: 16 BRPM

## 2023-08-08 DIAGNOSIS — S92.414A CLOSED NONDISPLACED FRACTURE OF PROXIMAL PHALANX OF RIGHT GREAT TOE, INITIAL ENCOUNTER: Primary | ICD-10-CM

## 2023-08-08 PROCEDURE — 99212 OFFICE O/P EST SF 10 MIN: CPT | Performed by: PHYSICIAN ASSISTANT

## 2023-08-12 DIAGNOSIS — Z12.31 ENCOUNTER FOR SCREENING MAMMOGRAM FOR BREAST CANCER: ICD-10-CM

## 2023-08-14 NOTE — TELEPHONE ENCOUNTER
Future Appointments    This patient does not currently have any appointments scheduled.   Past Visits    Date Provider Specialty Visit Type Primary Dx   08/08/2023 Cira Figueroa Orthopedic Surgery Office Visit Closed nondisplaced fracture of proximal phalanx of right great toe, initial encounter   07/12/2023 Cira Figueroa Orthopedic Surgery Office Visit Right foot injury, initial encounter   05/15/2023 Rosa Patel MD Endoscopy Surgery    02/27/2023 Kapil Kirkpatrick MD Primary Care Office Visit Left-sided headache

## 2023-08-16 RX ORDER — ERGOCALCIFEROL 1.25 MG/1
CAPSULE ORAL
Qty: 12 CAPSULE | Refills: 1 | Status: SHIPPED | OUTPATIENT
Start: 2023-08-16

## 2023-08-21 ENCOUNTER — OFFICE VISIT (OUTPATIENT)
Dept: PRIMARY CARE CLINIC | Age: 63
End: 2023-08-21
Payer: COMMERCIAL

## 2023-08-21 VITALS
WEIGHT: 221 LBS | DIASTOLIC BLOOD PRESSURE: 90 MMHG | BODY MASS INDEX: 29.93 KG/M2 | OXYGEN SATURATION: 98 % | TEMPERATURE: 97.1 F | SYSTOLIC BLOOD PRESSURE: 130 MMHG | HEART RATE: 80 BPM | HEIGHT: 72 IN

## 2023-08-21 DIAGNOSIS — A09 DIARRHEA OF INFECTIOUS ORIGIN: ICD-10-CM

## 2023-08-21 DIAGNOSIS — E78.00 HYPERCHOLESTEROLEMIA: ICD-10-CM

## 2023-08-21 DIAGNOSIS — A09 DIARRHEA OF INFECTIOUS ORIGIN: Primary | ICD-10-CM

## 2023-08-21 DIAGNOSIS — I48.20 CHRONIC ATRIAL FIBRILLATION (HCC): ICD-10-CM

## 2023-08-21 DIAGNOSIS — K21.9 GASTROESOPHAGEAL REFLUX DISEASE WITHOUT ESOPHAGITIS: ICD-10-CM

## 2023-08-21 DIAGNOSIS — I10 ESSENTIAL HYPERTENSION: ICD-10-CM

## 2023-08-21 DIAGNOSIS — J45.40 MODERATE PERSISTENT ASTHMA WITHOUT COMPLICATION: ICD-10-CM

## 2023-08-21 DIAGNOSIS — R10.32 LEFT LOWER QUADRANT ABDOMINAL PAIN: ICD-10-CM

## 2023-08-21 LAB
ALBUMIN SERPL-MCNC: 4.1 G/DL (ref 3.4–5)
ALBUMIN/GLOB SERPL: 1.6 {RATIO} (ref 1.1–2.2)
ALP SERPL-CCNC: 112 U/L (ref 40–129)
ALT SERPL-CCNC: 16 U/L (ref 10–40)
ANION GAP SERPL CALCULATED.3IONS-SCNC: 13 MMOL/L (ref 3–16)
AST SERPL-CCNC: 17 U/L (ref 15–37)
BASOPHILS # BLD: 0 K/UL (ref 0–0.2)
BASOPHILS NFR BLD: 0.7 %
BILIRUB SERPL-MCNC: 0.3 MG/DL (ref 0–1)
BUN SERPL-MCNC: 21 MG/DL (ref 7–20)
CALCIUM SERPL-MCNC: 9.1 MG/DL (ref 8.3–10.6)
CHLORIDE SERPL-SCNC: 102 MMOL/L (ref 99–110)
CHOLEST SERPL-MCNC: 133 MG/DL (ref 0–199)
CO2 SERPL-SCNC: 27 MMOL/L (ref 21–32)
CREAT SERPL-MCNC: 0.8 MG/DL (ref 0.6–1.2)
DEPRECATED RDW RBC AUTO: 15.6 % (ref 12.4–15.4)
EOSINOPHIL # BLD: 0.2 K/UL (ref 0–0.6)
EOSINOPHIL NFR BLD: 3.3 %
GFR SERPLBLD CREATININE-BSD FMLA CKD-EPI: >60 ML/MIN/{1.73_M2}
GLUCOSE SERPL-MCNC: 99 MG/DL (ref 70–99)
HCT VFR BLD AUTO: 35 % (ref 36–48)
HDLC SERPL-MCNC: 54 MG/DL (ref 40–60)
HGB BLD-MCNC: 11.7 G/DL (ref 12–16)
LDLC SERPL CALC-MCNC: 56 MG/DL
LYMPHOCYTES # BLD: 2 K/UL (ref 1–5.1)
LYMPHOCYTES NFR BLD: 31.3 %
MCH RBC QN AUTO: 28.5 PG (ref 26–34)
MCHC RBC AUTO-ENTMCNC: 33.3 G/DL (ref 31–36)
MCV RBC AUTO: 85.7 FL (ref 80–100)
MONOCYTES # BLD: 0.4 K/UL (ref 0–1.3)
MONOCYTES NFR BLD: 6.1 %
NEUTROPHILS # BLD: 3.7 K/UL (ref 1.7–7.7)
NEUTROPHILS NFR BLD: 58.6 %
PLATELET # BLD AUTO: 311 K/UL (ref 135–450)
PMV BLD AUTO: 8.4 FL (ref 5–10.5)
POTASSIUM SERPL-SCNC: 5 MMOL/L (ref 3.5–5.1)
PROT SERPL-MCNC: 6.7 G/DL (ref 6.4–8.2)
RBC # BLD AUTO: 4.09 M/UL (ref 4–5.2)
SODIUM SERPL-SCNC: 142 MMOL/L (ref 136–145)
TRIGL SERPL-MCNC: 115 MG/DL (ref 0–150)
VLDLC SERPL CALC-MCNC: 23 MG/DL
WBC # BLD AUTO: 6.4 K/UL (ref 4–11)

## 2023-08-21 PROCEDURE — 99214 OFFICE O/P EST MOD 30 MIN: CPT | Performed by: INTERNAL MEDICINE

## 2023-08-21 PROCEDURE — 3075F SYST BP GE 130 - 139MM HG: CPT | Performed by: INTERNAL MEDICINE

## 2023-08-21 PROCEDURE — 3079F DIAST BP 80-89 MM HG: CPT | Performed by: INTERNAL MEDICINE

## 2023-08-21 RX ORDER — FAMOTIDINE 20 MG/1
20 TABLET, FILM COATED ORAL 2 TIMES DAILY
Qty: 60 TABLET | Refills: 3 | Status: SHIPPED | OUTPATIENT
Start: 2023-08-21

## 2023-08-21 RX ORDER — FLUTICASONE PROPIONATE 50 MCG
1 SPRAY, SUSPENSION (ML) NASAL DAILY PRN
Qty: 16 G | Refills: 5 | Status: SHIPPED | OUTPATIENT
Start: 2023-08-21

## 2023-08-21 RX ORDER — ONDANSETRON 4 MG/1
TABLET, FILM COATED ORAL
COMMUNITY
Start: 2023-08-16

## 2023-08-21 RX ORDER — LOSARTAN POTASSIUM 25 MG/1
25 TABLET ORAL DAILY
Qty: 90 TABLET | Refills: 1 | Status: SHIPPED | OUTPATIENT
Start: 2023-08-21

## 2023-08-21 RX ORDER — PANTOPRAZOLE SODIUM 40 MG/1
40 TABLET, DELAYED RELEASE ORAL
Qty: 30 TABLET | Refills: 5 | Status: SHIPPED | OUTPATIENT
Start: 2023-08-21

## 2023-08-21 RX ORDER — BUDESONIDE AND FORMOTEROL FUMARATE DIHYDRATE 160; 4.5 UG/1; UG/1
2 AEROSOL RESPIRATORY (INHALATION) 2 TIMES DAILY
Qty: 10.2 G | Refills: 5 | Status: SHIPPED | OUTPATIENT
Start: 2023-08-21

## 2023-08-21 ASSESSMENT — ENCOUNTER SYMPTOMS
BACK PAIN: 1
EYE DISCHARGE: 0
RECTAL PAIN: 0
PHOTOPHOBIA: 0
WHEEZING: 0
SINUS PRESSURE: 0
BLURRED VISION: 0
VOMITING: 0
SORE THROAT: 0
EYE REDNESS: 0
ABDOMINAL PAIN: 1
STRIDOR: 0
TROUBLE SWALLOWING: 0
EYE PAIN: 0
CHEST TIGHTNESS: 0
SHORTNESS OF BREATH: 0
COUGH: 0
NAUSEA: 0
BLOOD IN STOOL: 0
RHINORRHEA: 0
CHOKING: 0
DIARRHEA: 1
VOICE CHANGE: 0
APNEA: 0
ANAL BLEEDING: 0
CONSTIPATION: 0
ORTHOPNEA: 0
ABDOMINAL DISTENTION: 0
EYE ITCHING: 0

## 2023-08-21 NOTE — PROGRESS NOTES
albuterol sulfate (PROAIR RESPICLICK) 976 (90 Base) MCG/ACT aerosol powder inhalation; Inhale 2 puffs into the lungs every 6 hours as needed for Wheezing or Shortness of Breath, Disp-1 each, R-5Normal  -     fluticasone (FLONASE) 50 MCG/ACT nasal spray; 1 spray by Each Nostril route daily as needed for Rhinitis, Disp-16 g, R-5Normal      Return in about 2 weeks (around 9/4/2023). Subjective   SUBJECTIVE/OBJECTIVE:  Diarrhea   This is a new (started 8/8/23) problem. The current episode started 1 to 4 weeks ago. The problem occurs 2 to 4 times per day. The problem has been waxing and waning. Diarrhea characteristics: diarrhea watery with clumps. The patient states that diarrhea awakens her from sleep. Associated symptoms include abdominal pain, arthralgias, chills, a fever, headaches and myalgias. Pertinent negatives include no coughing, sweats or vomiting. Associated symptoms comments: Lower abdominal pain  Urgent care did a covid flu test. Exacerbated by: eating, patient could only eat small amounts of oat meal and drinking bone broth. drank alot of gatorade. There are no known risk factors. She has tried increased fluids for the symptoms. The treatment provided moderate relief. There is no history of bowel resection or inflammatory bowel disease. Hypertension  This is a new problem. The current episode started more than 1 month ago. The problem has been gradually worsening (patient was on hctz 25 mg qd but stopped due to resolution of leg edema.) since onset. The problem is uncontrolled. Associated symptoms include headaches. Pertinent negatives include no anxiety, blurred vision, chest pain, malaise/fatigue, neck pain, orthopnea, palpitations, peripheral edema, PND, shortness of breath or sweats. There are no associated agents to hypertension. Risk factors for coronary artery disease include dyslipidemia. Past treatments include nothing (did not have elevated BP while on hctz for leg edema. ).  The current

## 2023-08-22 DIAGNOSIS — D64.9 NORMOCHROMIC NORMOCYTIC ANEMIA: Primary | ICD-10-CM

## 2023-08-23 NOTE — RESULT ENCOUNTER NOTE
The kidney blood tests are normal but drink more water. Normal blood sugar potassium level. Normal liver blood test.  The cholesterol is excellent. Continue rosuvastatin. Very mild anemia with a hemoglobin of 11.7 with normal was 12 or above.  3 stool Hemoccult cards from the office to make sure there is no blood loss from the gastrointestinal tract. When you come to the office today we will see the orders in the computer.

## 2023-09-01 ENCOUNTER — HOSPITAL ENCOUNTER (OUTPATIENT)
Dept: CT IMAGING | Age: 63
Discharge: HOME | End: 2023-09-01
Payer: COMMERCIAL

## 2023-09-01 DIAGNOSIS — R10.32 LEFT LOWER QUADRANT ABDOMINAL PAIN: ICD-10-CM

## 2023-09-01 PROCEDURE — 6360000004 HC RX CONTRAST MEDICATION: Performed by: INTERNAL MEDICINE

## 2023-09-01 PROCEDURE — 2500000003 HC RX 250 WO HCPCS: Performed by: INTERNAL MEDICINE

## 2023-09-01 PROCEDURE — 74177 CT ABD & PELVIS W/CONTRAST: CPT

## 2023-09-01 RX ADMIN — BARIUM SULFATE 900 ML: 20 SUSPENSION ORAL at 14:45

## 2023-09-01 RX ADMIN — IOPAMIDOL 75 ML: 755 INJECTION, SOLUTION INTRAVENOUS at 14:44

## 2023-09-02 DIAGNOSIS — I10 ESSENTIAL HYPERTENSION: ICD-10-CM

## 2023-09-02 DIAGNOSIS — K21.9 GASTROESOPHAGEAL REFLUX DISEASE WITHOUT ESOPHAGITIS: ICD-10-CM

## 2023-09-02 DIAGNOSIS — K44.9 LARGE HIATAL HERNIA: Primary | ICD-10-CM

## 2023-09-02 DIAGNOSIS — J30.89 OTHER ALLERGIC RHINITIS: Primary | ICD-10-CM

## 2023-09-02 DIAGNOSIS — M17.0 PRIMARY OSTEOARTHRITIS OF BOTH KNEES: ICD-10-CM

## 2023-09-02 RX ORDER — AZELASTINE 1 MG/ML
1 SPRAY, METERED NASAL 2 TIMES DAILY
Qty: 90 ML | Refills: 3 | Status: SHIPPED | OUTPATIENT
Start: 2023-09-02

## 2023-09-02 NOTE — RESULT ENCOUNTER NOTE
Moderate hiatal hernia on ct scan, otherwise normal. I want to refer you to [facility] weight loss program to help with weight reduction to help the hiatal hernia and continue protonix and pepcid.   Call the number below for consultation:    [facility], [place] ([facility]) - [employer], [address], [address]  [phone]

## 2023-09-05 ENCOUNTER — OFFICE VISIT (OUTPATIENT)
Dept: PRIMARY CARE CLINIC | Age: 63
End: 2023-09-05
Payer: COMMERCIAL

## 2023-09-05 VITALS
HEIGHT: 72 IN | HEART RATE: 92 BPM | WEIGHT: 221 LBS | TEMPERATURE: 97.2 F | SYSTOLIC BLOOD PRESSURE: 120 MMHG | OXYGEN SATURATION: 97 % | BODY MASS INDEX: 29.93 KG/M2 | DIASTOLIC BLOOD PRESSURE: 80 MMHG

## 2023-09-05 DIAGNOSIS — Z23 NEED FOR INFLUENZA VACCINATION: ICD-10-CM

## 2023-09-05 DIAGNOSIS — E78.2 MIXED HYPERLIPIDEMIA: ICD-10-CM

## 2023-09-05 DIAGNOSIS — Z95.0 PACEMAKER: ICD-10-CM

## 2023-09-05 DIAGNOSIS — I10 ESSENTIAL HYPERTENSION: ICD-10-CM

## 2023-09-05 DIAGNOSIS — K21.9 GASTROESOPHAGEAL REFLUX DISEASE WITHOUT ESOPHAGITIS: ICD-10-CM

## 2023-09-05 DIAGNOSIS — Z53.09 MRI CONTRAINDICATED DUE TO METAL IMPLANT: ICD-10-CM

## 2023-09-05 DIAGNOSIS — Z78.0 ASYMPTOMATIC LATE ONSET MENOPAUSE: ICD-10-CM

## 2023-09-05 DIAGNOSIS — K52.9 COLITIS: Primary | ICD-10-CM

## 2023-09-05 PROCEDURE — 3074F SYST BP LT 130 MM HG: CPT | Performed by: INTERNAL MEDICINE

## 2023-09-05 PROCEDURE — 3079F DIAST BP 80-89 MM HG: CPT | Performed by: INTERNAL MEDICINE

## 2023-09-05 PROCEDURE — 90471 IMMUNIZATION ADMIN: CPT | Performed by: INTERNAL MEDICINE

## 2023-09-05 PROCEDURE — 99214 OFFICE O/P EST MOD 30 MIN: CPT | Performed by: INTERNAL MEDICINE

## 2023-09-05 PROCEDURE — 90674 CCIIV4 VAC NO PRSV 0.5 ML IM: CPT | Performed by: INTERNAL MEDICINE

## 2023-09-05 RX ORDER — METRONIDAZOLE 500 MG/1
500 TABLET ORAL 2 TIMES DAILY
Qty: 14 TABLET | Refills: 0 | Status: SHIPPED | OUTPATIENT
Start: 2023-09-05 | End: 2023-09-12

## 2023-09-05 RX ORDER — FAMOTIDINE 20 MG/1
20 TABLET, FILM COATED ORAL 2 TIMES DAILY
Qty: 60 TABLET | Refills: 3 | Status: SHIPPED | OUTPATIENT
Start: 2023-09-05

## 2023-09-05 ASSESSMENT — ENCOUNTER SYMPTOMS
SORE THROAT: 0
ABDOMINAL DISTENTION: 0
TROUBLE SWALLOWING: 0
VOICE CHANGE: 0
STRIDOR: 0
RECTAL PAIN: 0
SINUS PRESSURE: 0
CONSTIPATION: 0
BACK PAIN: 1
COUGH: 0
CHOKING: 0
ANAL BLEEDING: 0
ABDOMINAL PAIN: 1
VOMITING: 0
PHOTOPHOBIA: 0
APNEA: 0
EYE REDNESS: 0
RHINORRHEA: 0
NAUSEA: 0
EYE PAIN: 0
BLURRED VISION: 0
WHEEZING: 0
EYE DISCHARGE: 0
EYE ITCHING: 0
DIARRHEA: 1
BLOOD IN STOOL: 0
CHEST TIGHTNESS: 0

## 2023-09-05 NOTE — PROGRESS NOTES
Cervical: No cervical adenopathy. Skin:     General: Skin is warm and dry. Coloration: Skin is not pale. Findings: No erythema or rash. Neurological:      General: No focal deficit present. Mental Status: She is alert and oriented to person, place, and time. Cranial Nerves: No cranial nerve deficit. Sensory: No sensory deficit. Motor: No abnormal muscle tone. Coordination: Coordination normal.      Deep Tendon Reflexes: Reflexes are normal and symmetric. Reflexes normal.   Psychiatric:         Mood and Affect: Mood normal.         Behavior: Behavior normal.         Thought Content: Thought content normal.         Judgment: Judgment normal.                An electronic signature was used to authenticate this note.     --Nuria Rios MD

## 2023-09-28 ENCOUNTER — TELEPHONE (OUTPATIENT)
Dept: PRIMARY CARE CLINIC | Age: 63
End: 2023-09-28

## 2023-09-28 NOTE — TELEPHONE ENCOUNTER
[facility]  Open mammogram order [facility] scheduling # [phone]

## 2023-11-15 DIAGNOSIS — R51.9 LEFT-SIDED HEADACHE: ICD-10-CM

## 2023-11-15 NOTE — TELEPHONE ENCOUNTER
Medication:   Requested Prescriptions     Pending Prescriptions Disp Refills    topiramate (TOPAMAX) 25 MG tablet [Pharmacy Med Name: TOPIRAMATE 25 MG TABLET] 60 tablet 5     Sig: TAKE 1 TABLET BY MOUTH TWICE A DAY        Last Filled:      Patient Phone Number: [phone] (home)     Last appt: 9/5/2023   Next appt: Visit date not found    Last OARRS:        No data to display

## 2023-11-17 RX ORDER — TOPIRAMATE 25 MG/1
25 TABLET ORAL 2 TIMES DAILY
Qty: 60 TABLET | Refills: 5 | Status: SHIPPED | OUTPATIENT
Start: 2023-11-17

## 2024-01-10 DIAGNOSIS — A09 DIARRHEA OF INFECTIOUS ORIGIN: ICD-10-CM

## 2024-01-10 LAB — C DIFF TOX A+B STL QL IA: NORMAL

## 2024-01-11 LAB
G LAMBLIA AG STL QL IA: NORMAL
GI PATHOGENS PNL STL NAA+PROBE: NORMAL

## 2024-01-15 ENCOUNTER — OFFICE VISIT (OUTPATIENT)
Dept: PRIMARY CARE CLINIC | Age: 64
End: 2024-01-15
Payer: COMMERCIAL

## 2024-01-15 VITALS
WEIGHT: 218.2 LBS | HEIGHT: 72 IN | BODY MASS INDEX: 29.55 KG/M2 | TEMPERATURE: 97 F | RESPIRATION RATE: 16 BRPM | HEART RATE: 84 BPM | SYSTOLIC BLOOD PRESSURE: 122 MMHG | DIASTOLIC BLOOD PRESSURE: 86 MMHG

## 2024-01-15 DIAGNOSIS — R11.2 NAUSEA AND VOMITING, UNSPECIFIED VOMITING TYPE: ICD-10-CM

## 2024-01-15 DIAGNOSIS — R13.14 PHARYNGOESOPHAGEAL DYSPHAGIA: Primary | ICD-10-CM

## 2024-01-15 DIAGNOSIS — R10.32 LLQ PAIN: ICD-10-CM

## 2024-01-15 DIAGNOSIS — E07.89 THYROID PAIN: ICD-10-CM

## 2024-01-15 DIAGNOSIS — R10.11 RUQ PAIN: ICD-10-CM

## 2024-01-15 DIAGNOSIS — H92.03 ACUTE EAR PAIN, BILATERAL: ICD-10-CM

## 2024-01-15 DIAGNOSIS — K92.1 HEMATOCHEZIA: ICD-10-CM

## 2024-01-15 PROCEDURE — 99214 OFFICE O/P EST MOD 30 MIN: CPT | Performed by: INTERNAL MEDICINE

## 2024-01-15 PROCEDURE — 3074F SYST BP LT 130 MM HG: CPT | Performed by: INTERNAL MEDICINE

## 2024-01-15 PROCEDURE — 3079F DIAST BP 80-89 MM HG: CPT | Performed by: INTERNAL MEDICINE

## 2024-01-15 RX ORDER — METRONIDAZOLE 500 MG/1
500 TABLET ORAL 2 TIMES DAILY
Qty: 14 TABLET | Refills: 0 | Status: SHIPPED | OUTPATIENT
Start: 2024-01-15 | End: 2024-01-22

## 2024-01-15 RX ORDER — AMOXICILLIN AND CLAVULANATE POTASSIUM 875; 125 MG/1; MG/1
1 TABLET, FILM COATED ORAL 2 TIMES DAILY
Qty: 14 TABLET | Refills: 0 | Status: SHIPPED | OUTPATIENT
Start: 2024-01-15 | End: 2024-01-22

## 2024-01-15 RX ORDER — ONDANSETRON 4 MG/1
4 TABLET, ORALLY DISINTEGRATING ORAL 3 TIMES DAILY PRN
Qty: 21 TABLET | Refills: 0 | Status: SHIPPED | OUTPATIENT
Start: 2024-01-15

## 2024-01-15 ASSESSMENT — PATIENT HEALTH QUESTIONNAIRE - PHQ9
SUM OF ALL RESPONSES TO PHQ QUESTIONS 1-9: 0
2. FEELING DOWN, DEPRESSED OR HOPELESS: 0
SUM OF ALL RESPONSES TO PHQ9 QUESTIONS 1 & 2: 0
SUM OF ALL RESPONSES TO PHQ QUESTIONS 1-9: 0
SUM OF ALL RESPONSES TO PHQ QUESTIONS 1-9: 0
1. LITTLE INTEREST OR PLEASURE IN DOING THINGS: 0
SUM OF ALL RESPONSES TO PHQ QUESTIONS 1-9: 0

## 2024-01-15 NOTE — PROGRESS NOTES
Haley Sharif (:  1960) is a 63 y.o. female,Established patient, here for evaluation of the following chief complaint(s):  Follow-up         ASSESSMENT/PLAN:  1. Pharyngoesophageal dysphagia reviewed EGD from last year with patient where she had a dilatation.  However over the past month symptoms have recurred and even some dysphagia with liquids.  Patient is on PPI pantoprazole 40 mg daily and Pepcid 20 mg twice a day without relief.  Will refer to GI  -     NICOLE - Jordyn, and check a swallowing study just to make sure no motility disorder since no high-grade stricture was seen with past EGD.  MD Efraín, Gastroenterology, [facility]  -     ondansetron (ZOFRAN-ODT) 4 MG disintegrating tablet; Take 1 tablet by mouth 3 times daily as needed for Nausea or Vomiting, Disp-21 tablet, R-0Normal  -     SLP swallowing-dysphagia (IP); Future  2. Hematochezia had a recent normal colonoscopy make sure no anemia with CBC and refer to GI to see if there is internal hemorrhoid that needs banding.  Also patient is on Xarelto for A-fib.  -     Efraín So MD, Gastroenterology, [facility]  -     Comprehensive Metabolic Panel; Future  -     CBC with Auto Differential; Future  3. Nausea and vomiting, unspecified vomiting type CT hand.  Scan showed normal liver and gallbladder less than a year ago, check HIDA scan  -     Efraín So MD, Gastroenterology, [facility]  -     NM HEPATOBILIARY; Future  4. RUQ pain and left lower quadrant pain concern for diverticulitis treat with Augmentin and Flagyl.  -     CT ABDOMEN PELVIS W IV CONTRAST Additional Contrast? Oral; Future  5. Acute ear pain, bilateral without any redness or infection continue to take antihistamine.  6. LLQ pain same as problem b will get CT efore.  Due to a lot of guarding on exam  -     Culture, Urine; Future  -     Urinalysis with Microscopic; Future  -     CT ABDOMEN PELVIS W IV CONTRAST Additional Contrast?

## 2024-01-16 ASSESSMENT — ENCOUNTER SYMPTOMS
NAUSEA: 1
STRIDOR: 0
TROUBLE SWALLOWING: 0
SORE THROAT: 0
VOICE CHANGE: 0
CHEST TIGHTNESS: 0
DIARRHEA: 1
WHEEZING: 0
RECTAL PAIN: 0
RHINORRHEA: 0
EYE ITCHING: 0
PHOTOPHOBIA: 0
VOMITING: 1
COUGH: 0
ABDOMINAL DISTENTION: 0
CHOKING: 0
HEARTBURN: 1
APNEA: 0
DIFFICULTY BREATHING: 0
HEMATOCHEZIA: 1
GLOBUS SENSATION: 0
BACK PAIN: 1
BLOOD IN STOOL: 0
EYE PAIN: 0
CONSTIPATION: 0
EYE DISCHARGE: 0
ABDOMINAL PAIN: 1
EYE REDNESS: 0
SINUS PRESSURE: 0
BELCHING: 1
ANAL BLEEDING: 0

## 2024-01-18 DIAGNOSIS — K92.1 HEMATOCHEZIA: ICD-10-CM

## 2024-01-18 DIAGNOSIS — R10.32 LLQ PAIN: ICD-10-CM

## 2024-01-18 DIAGNOSIS — E07.89 THYROID PAIN: ICD-10-CM

## 2024-01-18 LAB
BACTERIA URNS QL MICRO: ABNORMAL /HPF
BASOPHILS # BLD: 0.1 K/UL (ref 0–0.2)
BASOPHILS NFR BLD: 0.7 %
BILIRUB UR QL STRIP.AUTO: NEGATIVE
CLARITY UR: CLEAR
COLOR UR: YELLOW
DEPRECATED RDW RBC AUTO: 15.2 % (ref 12.4–15.4)
EOSINOPHIL # BLD: 0.2 K/UL (ref 0–0.6)
EOSINOPHIL NFR BLD: 3.1 %
EPI CELLS #/AREA URNS AUTO: 0 /HPF (ref 0–5)
GLUCOSE UR STRIP.AUTO-MCNC: NEGATIVE MG/DL
HCT VFR BLD AUTO: 36.2 % (ref 36–48)
HGB BLD-MCNC: 11.7 G/DL (ref 12–16)
HGB UR QL STRIP.AUTO: NEGATIVE
HYALINE CASTS #/AREA URNS AUTO: 0 /LPF (ref 0–8)
KETONES UR STRIP.AUTO-MCNC: NEGATIVE MG/DL
LEUKOCYTE ESTERASE UR QL STRIP.AUTO: ABNORMAL
LYMPHOCYTES # BLD: 2.4 K/UL (ref 1–5.1)
LYMPHOCYTES NFR BLD: 30.2 %
MCH RBC QN AUTO: 27.3 PG (ref 26–34)
MCHC RBC AUTO-ENTMCNC: 32.4 G/DL (ref 31–36)
MCV RBC AUTO: 84.2 FL (ref 80–100)
MONOCYTES # BLD: 0.3 K/UL (ref 0–1.3)
MONOCYTES NFR BLD: 4.1 %
NEUTROPHILS # BLD: 4.8 K/UL (ref 1.7–7.7)
NEUTROPHILS NFR BLD: 61.9 %
NITRITE UR QL STRIP.AUTO: NEGATIVE
PH UR STRIP.AUTO: 6.5 [PH] (ref 5–8)
PLATELET # BLD AUTO: 320 K/UL (ref 135–450)
PMV BLD AUTO: 8.7 FL (ref 5–10.5)
PROT UR STRIP.AUTO-MCNC: NEGATIVE MG/DL
RBC # BLD AUTO: 4.3 M/UL (ref 4–5.2)
RBC CLUMPS #/AREA URNS AUTO: 1 /HPF (ref 0–4)
SP GR UR STRIP.AUTO: 1.03 (ref 1–1.03)
UA DIPSTICK W REFLEX MICRO PNL UR: YES
URN SPEC COLLECT METH UR: ABNORMAL
UROBILINOGEN UR STRIP-ACNC: 1 E.U./DL
WBC # BLD AUTO: 7.8 K/UL (ref 4–11)
WBC #/AREA URNS AUTO: 0 /HPF (ref 0–5)

## 2024-01-19 LAB
ALBUMIN SERPL-MCNC: 4.4 G/DL (ref 3.4–5)
ALBUMIN/GLOB SERPL: 1.6 {RATIO} (ref 1.1–2.2)
ALP SERPL-CCNC: 116 U/L (ref 40–129)
ALT SERPL-CCNC: 10 U/L (ref 10–40)
ANION GAP SERPL CALCULATED.3IONS-SCNC: 10 MMOL/L (ref 3–16)
AST SERPL-CCNC: 14 U/L (ref 15–37)
BACTERIA UR CULT: NORMAL
BILIRUB SERPL-MCNC: 0.3 MG/DL (ref 0–1)
BUN SERPL-MCNC: 18 MG/DL (ref 7–20)
CALCIUM SERPL-MCNC: 9.3 MG/DL (ref 8.3–10.6)
CHLORIDE SERPL-SCNC: 102 MMOL/L (ref 99–110)
CO2 SERPL-SCNC: 28 MMOL/L (ref 21–32)
CREAT SERPL-MCNC: 0.8 MG/DL (ref 0.6–1.2)
GFR SERPLBLD CREATININE-BSD FMLA CKD-EPI: >60 ML/MIN/{1.73_M2}
GLUCOSE SERPL-MCNC: 101 MG/DL (ref 70–99)
POTASSIUM SERPL-SCNC: 4.6 MMOL/L (ref 3.5–5.1)
PROT SERPL-MCNC: 7.2 G/DL (ref 6.4–8.2)
SODIUM SERPL-SCNC: 140 MMOL/L (ref 136–145)
TSH SERPL DL<=0.005 MIU/L-ACNC: 1.1 UIU/ML (ref 0.27–4.2)

## 2024-01-19 NOTE — RESULT ENCOUNTER NOTE
Normal urinalysis.  Normal kidney blood test.  The blood sugar is good.  Normal liver blood test.  The hemoglobin is slightly low but stable at 11.7 so no signs of active bleeding.  The thyroid level is normal.

## 2024-01-28 DIAGNOSIS — Z12.31 ENCOUNTER FOR SCREENING MAMMOGRAM FOR BREAST CANCER: ICD-10-CM

## 2024-01-29 NOTE — TELEPHONE ENCOUNTER
Medication:   Requested Prescriptions     Pending Prescriptions Disp Refills    vitamin D (ERGOCALCIFEROL) 1.25 MG (96758 UT) CAPS capsule [Pharmacy Med Name: VITAMIN D2 1.25MG(50,000 UNIT)] 12 capsule 1     Sig: TAKE 1 CAPSULE BY MOUTH ONCE WEEKLY        Last Filled:      Patient Phone Number: [phone] (home)     Last appt: 1/15/2024   Next appt: 2/13/2024    Last OARRS:        No data to display

## 2024-01-30 RX ORDER — ERGOCALCIFEROL 1.25 MG/1
50000 CAPSULE ORAL WEEKLY
Qty: 12 CAPSULE | Refills: 1 | Status: SHIPPED | OUTPATIENT
Start: 2024-01-30

## 2024-01-31 ENCOUNTER — HOSPITAL ENCOUNTER (OUTPATIENT)
Dept: NUCLEAR MEDICINE | Age: 64
Discharge: HOME | End: 2024-01-31
Attending: INTERNAL MEDICINE
Payer: COMMERCIAL

## 2024-01-31 DIAGNOSIS — R11.2 NAUSEA AND VOMITING, UNSPECIFIED VOMITING TYPE: ICD-10-CM

## 2024-01-31 PROCEDURE — 78227 HEPATOBIL SYST IMAGE W/DRUG: CPT

## 2024-01-31 PROCEDURE — 3430000000 HC RX DIAGNOSTIC RADIOPHARMACEUTICAL: Performed by: INTERNAL MEDICINE

## 2024-01-31 PROCEDURE — A9537 TC99M MEBROFENIN: HCPCS | Performed by: INTERNAL MEDICINE

## 2024-01-31 RX ADMIN — Medication 6 MILLICURIE: at 09:24

## 2024-02-02 DIAGNOSIS — R13.12 OROPHARYNGEAL DYSPHAGIA: Primary | ICD-10-CM

## 2024-02-02 PROBLEM — H66.001 ACUTE SUPPR OTITIS MEDIA W/O SPON RUPT EAR DRUM, RIGHT EAR: Status: RESOLVED | Noted: 2019-03-11 | Resolved: 2024-02-02

## 2024-02-02 PROBLEM — H66.13 CHRONIC TUBOTYMPANIC SUPPURATIVE OTITIS MEDIA OF BOTH EARS: Status: RESOLVED | Noted: 2020-09-11 | Resolved: 2024-02-02

## 2024-02-02 PROBLEM — H92.02 OTALGIA OF LEFT EAR: Status: RESOLVED | Noted: 2020-07-23 | Resolved: 2024-02-02

## 2024-02-02 PROBLEM — I82.409 ACUTE DEEP VEIN THROMBOSIS (DVT) OF LOWER EXTREMITY (HCC): Status: RESOLVED | Noted: 2019-08-11 | Resolved: 2024-02-02

## 2024-02-02 PROBLEM — H66.006 RECURRENT ACUTE SUPPURATIVE OTITIS MEDIA WITHOUT SPONTANEOUS RUPTURE OF TYMPANIC MEMBRANE OF BOTH SIDES: Status: RESOLVED | Noted: 2019-03-11 | Resolved: 2024-02-02

## 2024-02-02 PROBLEM — R11.2 NAUSEA AND VOMITING: Status: RESOLVED | Noted: 2024-01-15 | Resolved: 2024-02-02

## 2024-02-02 PROBLEM — R10.32 LEFT LOWER QUADRANT ABDOMINAL PAIN: Status: RESOLVED | Noted: 2023-08-21 | Resolved: 2024-02-02

## 2024-02-02 PROBLEM — A09 DIARRHEA OF INFECTIOUS ORIGIN: Status: RESOLVED | Noted: 2023-08-21 | Resolved: 2024-02-02

## 2024-02-02 PROBLEM — I26.92 ACUTE SADDLE PULMONARY EMBOLISM WITHOUT ACUTE COR PULMONALE (HCC): Status: RESOLVED | Noted: 2019-08-07 | Resolved: 2024-02-02

## 2024-02-02 PROBLEM — J01.90 ACUTE RHINOSINUSITIS: Status: RESOLVED | Noted: 2019-03-19 | Resolved: 2024-02-02

## 2024-02-02 PROBLEM — J96.01 ACUTE RESPIRATORY FAILURE WITH HYPOXIA (HCC): Status: RESOLVED | Noted: 2019-08-11 | Resolved: 2024-02-02

## 2024-02-06 ENCOUNTER — APPOINTMENT (OUTPATIENT)
Dept: ULTRASOUND IMAGING | Age: 64
End: 2024-02-06
Payer: COMMERCIAL

## 2024-02-06 ENCOUNTER — HOSPITAL ENCOUNTER (OUTPATIENT)
Dept: CT IMAGING | Age: 64
Discharge: HOME | End: 2024-02-06
Attending: INTERNAL MEDICINE
Payer: COMMERCIAL

## 2024-02-06 DIAGNOSIS — R10.11 RUQ PAIN: ICD-10-CM

## 2024-02-06 DIAGNOSIS — R10.32 LLQ PAIN: ICD-10-CM

## 2024-02-06 PROCEDURE — 6360000004 HC RX CONTRAST MEDICATION: Performed by: INTERNAL MEDICINE

## 2024-02-06 PROCEDURE — 74177 CT ABD & PELVIS W/CONTRAST: CPT

## 2024-02-06 RX ADMIN — IOPAMIDOL 75 ML: 755 INJECTION, SOLUTION INTRAVENOUS at 13:56

## 2024-02-06 RX ADMIN — IOPAMIDOL 50 ML: 612 INJECTION, SOLUTION INTRAVENOUS at 13:56

## 2024-02-07 DIAGNOSIS — E78.2 MIXED HYPERLIPIDEMIA: ICD-10-CM

## 2024-02-07 RX ORDER — ROSUVASTATIN CALCIUM 10 MG/1
10 TABLET, COATED ORAL NIGHTLY
Qty: 90 TABLET | Refills: 3 | Status: SHIPPED | OUTPATIENT
Start: 2024-02-07

## 2024-02-07 NOTE — TELEPHONE ENCOUNTER
Medication:   Requested Prescriptions     Pending Prescriptions Disp Refills    rosuvastatin (CRESTOR) 10 MG tablet 90 tablet 3     Sig: Take 1 tablet by mouth nightly        Last Filled:      Patient Phone Number: [phone] (home)     Last appt: 1/15/2024   Next appt: 2/13/2024    Last OARRS:        No data to display

## 2024-02-07 NOTE — RESULT ENCOUNTER NOTE
There is a small gallbladder stone or polyp.  With the nausea, I want you to make an appointment with the GI doctor to see if we should get the gall bladder removed, since the HIDA scan showed normal gall bladder function.  Make an appointment to see Dr. Cobb to discuss.

## 2024-02-13 ENCOUNTER — OFFICE VISIT (OUTPATIENT)
Dept: PRIMARY CARE CLINIC | Age: 64
End: 2024-02-13
Payer: COMMERCIAL

## 2024-02-13 VITALS
WEIGHT: 221.4 LBS | TEMPERATURE: 97.2 F | BODY MASS INDEX: 29.99 KG/M2 | HEIGHT: 72 IN | DIASTOLIC BLOOD PRESSURE: 78 MMHG | HEART RATE: 75 BPM | SYSTOLIC BLOOD PRESSURE: 110 MMHG | RESPIRATION RATE: 16 BRPM | OXYGEN SATURATION: 94 %

## 2024-02-13 DIAGNOSIS — R13.12 OROPHARYNGEAL DYSPHAGIA: ICD-10-CM

## 2024-02-13 DIAGNOSIS — K62.5 RECTAL BLEEDING: Primary | ICD-10-CM

## 2024-02-13 DIAGNOSIS — J02.9 SORE THROAT: ICD-10-CM

## 2024-02-13 DIAGNOSIS — H69.92 DYSFUNCTION OF LEFT EUSTACHIAN TUBE: ICD-10-CM

## 2024-02-13 DIAGNOSIS — Z12.31 ENCOUNTER FOR SCREENING MAMMOGRAM FOR BREAST CANCER: ICD-10-CM

## 2024-02-13 PROCEDURE — 99214 OFFICE O/P EST MOD 30 MIN: CPT | Performed by: INTERNAL MEDICINE

## 2024-02-13 PROCEDURE — 3074F SYST BP LT 130 MM HG: CPT | Performed by: INTERNAL MEDICINE

## 2024-02-13 PROCEDURE — 3078F DIAST BP <80 MM HG: CPT | Performed by: INTERNAL MEDICINE

## 2024-02-13 RX ORDER — LOSARTAN POTASSIUM 25 MG/1
TABLET ORAL
COMMUNITY
Start: 2023-11-16 | End: 2024-02-13 | Stop reason: ALTCHOICE

## 2024-02-13 RX ORDER — FLUTICASONE PROPIONATE 50 MCG
1 SPRAY, SUSPENSION (ML) NASAL DAILY
Qty: 16 G | Refills: 2 | Status: SHIPPED | OUTPATIENT
Start: 2024-02-13

## 2024-02-13 RX ORDER — CEFUROXIME AXETIL 250 MG/1
250 TABLET ORAL 2 TIMES DAILY
Qty: 20 TABLET | Refills: 0 | Status: SHIPPED | OUTPATIENT
Start: 2024-02-13 | End: 2024-02-23

## 2024-02-13 RX ORDER — CETIRIZINE HYDROCHLORIDE 10 MG/1
10 TABLET ORAL DAILY
Qty: 30 TABLET | Refills: 5 | Status: SHIPPED | OUTPATIENT
Start: 2024-02-13

## 2024-02-13 NOTE — PROGRESS NOTES
allergies. Negative for food allergies and immunocompromised state.   Neurological:  Negative for dizziness, tremors, seizures, syncope, weakness, numbness and headaches.            Migraines since age 13.  Starts with aura and dizziness. It switches sides, but more frequently on the left.  Headaches are more frequent and more severe. One to two per week.   Hematological:  Negative for adenopathy. Bruises/bleeds easily.        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  Coumadin Oct 1998 to  January of 2008.  Pulmonary embolus 2019 on lifetime anticoagulation, unprovoked.   Psychiatric/Behavioral: Negative.            Objective   Physical Exam  Constitutional:       General: She is not in acute distress.     Appearance: She is well-developed. She is not diaphoretic.   HENT:      Head: Normocephalic and atraumatic.      Right Ear: External ear normal.      Left Ear: External ear normal.   Eyes:      General: No scleral icterus.        Right eye: No discharge.         Left eye: No discharge.      Conjunctiva/sclera: Conjunctivae normal.      Pupils: Pupils are equal, round, and reactive to light.   Neck:      Thyroid: No thyromegaly.      Vascular: No JVD.      Trachea: No tracheal deviation.   Cardiovascular:      Rate and Rhythm: Normal rate.      Heart sounds: Normal heart sounds. No murmur heard.     No gallop.   Pulmonary:      Effort: Pulmonary effort is normal. No respiratory distress.      Breath sounds: Normal breath sounds. No wheezing or rales.   Chest:      Chest wall: No tenderness.   Abdominal:      General: Bowel sounds are normal. There is no distension.      Palpations: Abdomen is soft. There is no mass.      Tenderness: There is no abdominal tenderness. There is no right CVA tenderness, left CVA tenderness, guarding or rebound.      Comments: Left mid and lower quadrant pain   Musculoskeletal:         General: No tenderness. Normal range of motion.

## 2024-02-14 DIAGNOSIS — I10 ESSENTIAL HYPERTENSION: ICD-10-CM

## 2024-02-14 DIAGNOSIS — K21.9 GASTROESOPHAGEAL REFLUX DISEASE WITHOUT ESOPHAGITIS: ICD-10-CM

## 2024-02-14 RX ORDER — LOSARTAN POTASSIUM 25 MG/1
25 TABLET ORAL DAILY
Qty: 90 TABLET | Refills: 1 | OUTPATIENT
Start: 2024-02-14

## 2024-02-14 RX ORDER — PANTOPRAZOLE SODIUM 40 MG/1
TABLET, DELAYED RELEASE ORAL
Qty: 30 TABLET | Refills: 5 | OUTPATIENT
Start: 2024-02-14

## 2024-02-14 RX ORDER — PANTOPRAZOLE SODIUM 40 MG/1
40 TABLET, DELAYED RELEASE ORAL
Qty: 30 TABLET | Refills: 5 | Status: SHIPPED | OUTPATIENT
Start: 2024-02-14

## 2024-02-14 NOTE — TELEPHONE ENCOUNTER
Medication:   Requested Prescriptions     Pending Prescriptions Disp Refills    pantoprazole (PROTONIX) 40 MG tablet [Pharmacy Med Name: PANTOPRAZOLE SOD DR 40 MG TAB] 30 tablet 5     Sig: TAKE ONE TABLET BY MOUTH EVERY MORNING BEFORE BREAKFAST -- STOP OMEPRAZOLE    losartan (COZAAR) 25 MG tablet [Pharmacy Med Name: LOSARTAN POTASSIUM 25 MG TAB] 90 tablet 1     Sig: TAKE 1 TABLET BY MOUTH DAILY        Last appt: 2/13/2024   Next appt: 4/15/2024

## 2024-02-15 ENCOUNTER — OFFICE VISIT (OUTPATIENT)
Dept: ENT CLINIC | Age: 64
End: 2024-02-15
Payer: COMMERCIAL

## 2024-02-15 VITALS
HEIGHT: 72 IN | DIASTOLIC BLOOD PRESSURE: 84 MMHG | HEART RATE: 71 BPM | SYSTOLIC BLOOD PRESSURE: 128 MMHG | WEIGHT: 220 LBS | TEMPERATURE: 97.8 F | OXYGEN SATURATION: 97 % | BODY MASS INDEX: 29.8 KG/M2

## 2024-02-15 DIAGNOSIS — H69.92 ETD (EUSTACHIAN TUBE DYSFUNCTION), LEFT: Primary | Chronic | ICD-10-CM

## 2024-02-15 DIAGNOSIS — H91.93 BILATERAL HEARING LOSS, UNSPECIFIED HEARING LOSS TYPE: ICD-10-CM

## 2024-02-15 DIAGNOSIS — H66.13 CHRONIC TUBOTYMPANIC SUPPURATIVE OTITIS MEDIA, BILATERAL: Chronic | ICD-10-CM

## 2024-02-15 DIAGNOSIS — H61.21 IMPACTED CERUMEN OF RIGHT EAR: ICD-10-CM

## 2024-02-15 DIAGNOSIS — H90.11 CONDUCTIVE HEARING LOSS OF RIGHT EAR WITH UNRESTRICTED HEARING OF LEFT EAR: ICD-10-CM

## 2024-02-15 DIAGNOSIS — Z98.890 STATUS POST TYMPANOPLASTY: ICD-10-CM

## 2024-02-15 PROBLEM — H72.2X2 MARGINAL PERFORATION OF TYMPANIC MEMBRANE OF LEFT EAR: Chronic | Status: RESOLVED | Noted: 2020-08-10 | Resolved: 2024-02-15

## 2024-02-15 PROBLEM — H72.92 PERFORATION OF LEFT TYMPANIC MEMBRANE: Status: RESOLVED | Noted: 2020-07-23 | Resolved: 2024-02-15

## 2024-02-15 PROBLEM — H72.91 PERFORATION OF RIGHT TYMPANIC MEMBRANE: Chronic | Status: RESOLVED | Noted: 2021-03-05 | Resolved: 2024-02-15

## 2024-02-15 PROBLEM — H72.2X1 MARGINAL PERFORATION OF TYMPANIC MEMBRANE OF RIGHT EAR: Status: RESOLVED | Noted: 2020-08-10 | Resolved: 2024-02-15

## 2024-02-15 PROCEDURE — 99213 OFFICE O/P EST LOW 20 MIN: CPT | Performed by: OTOLARYNGOLOGY

## 2024-02-15 PROCEDURE — 3074F SYST BP LT 130 MM HG: CPT | Performed by: OTOLARYNGOLOGY

## 2024-02-15 PROCEDURE — 69210 REMOVE IMPACTED EAR WAX UNI: CPT | Performed by: OTOLARYNGOLOGY

## 2024-02-15 PROCEDURE — 3079F DIAST BP 80-89 MM HG: CPT | Performed by: OTOLARYNGOLOGY

## 2024-02-15 NOTE — PROGRESS NOTES
CHIEF COMPLAINT  Chief Complaint   Patient presents with    recurrent ear infections    Cerumen Impaction    fluid in middle ear       HISTORY OF PRESENT ILLNESS     Haley Sharif is a 63 y.o. female who presented today for evaluation and management for recurrent ear infections.  Both ears hurt.  Right off and on and left ear hurts all the time, for two to three months.  My ears keep getting infected.  Treated at [facility] several times.  Treated by Dr. Evans, most recently on Tuesday.  I have had several ear infections prior to three months ago.  I have wax in my right ear and fluid in my left ear.  Needs TV up at a loud volume, son complains.      REVIEW OF SYSTEMS  Constitutional:  Denied fever.  ENT/sinus:  Denied otorrhea, nasal pain, rhinorrhea, sore throat, and sinus/facial pain.        EXAMINATION    WDWN, NAD  Face:  Normal skin with no lesions detected.    Voice:  Normal, with no hoarseness, breathiness, or hot potato quality.  (+) Ears:    AS - TM non-erythematous, dull, thickened in the anterior area and thin monomeric in the posterior half with hypermobility of the monomeric area.    AD - TM dull non-erythematous with variable thickness, patches of sclerosis with mild ABEBA and with normal pneumatic mobility.    EACs, mastoids and pinnae were normal.    Nose:  Normal with no lesions.  Sinuses: Nontender x 4   Throat,  OC/OP:  Normal with no lesions.  Neck:  NT, No masses.  Trachea midline.  Nodes:  No lymphadenopathy.     I have performed the physical exam personally.        IMPRESSION / DIAGNOSES / ORDERS:   Haley was seen today for recurrent ear infections, cerumen impaction and fluid in middle ear.    Diagnoses and all orders for this visit:    ETD (Eustachian tube dysfunction), left    Chronic tubotympanic suppurative otitis media, bilateral  -     [facility]    Impacted cerumen of right ear    Conductive hearing loss of right ear with unrestricted hearing of left

## 2024-02-15 NOTE — PATIENT INSTRUCTIONS
EUSTACHIAN TUBE DYSFUNCTION MEASURES    Please do the following to attempt to improve your Eustachian tube dysfunction  and/or to help to resolve the fluid in your middle ear:    1.  Auto inflate your ears as instructed ( hold your nose closed, with your mouth closed and blow out as if blowing ear into or out of ears.  If not successful, then swallow while doing the auto inflation maneuver and maintaining the pressure) every three hours, while awake, for ten days, and then four times daily for 10 days, and then three times daily and as needed for ear congestion.     2.  Take one Mucinex (blue box) maximum strength 1200 mg tablet (or two 600 regular strength tablets)every 12 hours, daily for the next 14 days. (OTC)      3.  Use 0.05% Oxymetazoline (eg. Afrin, Duration, 4-Way) 12 hour decongestant nasal solution, with two sprays in each nostril three times a day for three days, then twice a day for two days, then stop for two days and then repeat the cycle once.    4.  Use fluticasone nasal spray (generic Flonase), 2 sprays in each nostril once daily.

## 2024-02-26 ENCOUNTER — HOSPITAL ENCOUNTER (OUTPATIENT)
Dept: MAMMOGRAPHY | Age: 64
Discharge: HOME | End: 2024-03-01
Payer: COMMERCIAL

## 2024-02-26 VITALS — WEIGHT: 215 LBS | HEIGHT: 72 IN | BODY MASS INDEX: 29.12 KG/M2

## 2024-02-26 DIAGNOSIS — M79.2 NEUROPATHIC PAIN: ICD-10-CM

## 2024-02-26 DIAGNOSIS — Z12.31 VISIT FOR SCREENING MAMMOGRAM: ICD-10-CM

## 2024-02-26 PROCEDURE — 77063 BREAST TOMOSYNTHESIS BI: CPT

## 2024-02-26 RX ORDER — DULOXETIN HYDROCHLORIDE 60 MG/1
CAPSULE, DELAYED RELEASE ORAL
Qty: 90 CAPSULE | Refills: 3 | Status: SHIPPED | OUTPATIENT
Start: 2024-02-26

## 2024-02-26 NOTE — TELEPHONE ENCOUNTER
Medication:   Requested Prescriptions     Pending Prescriptions Disp Refills    DULoxetine (CYMBALTA) 60 MG extended release capsule [Pharmacy Med Name: DULoxetine HCL DR 60 MG CAPSULE] 90 capsule 3     Sig: TAKE ONE CAPSULE BY MOUTH DAILY      Last appt: 2/13/2024   Next appt: 4/15/2024

## 2024-03-13 DIAGNOSIS — J45.40 MODERATE PERSISTENT ASTHMA WITHOUT COMPLICATION: ICD-10-CM

## 2024-03-13 NOTE — TELEPHONE ENCOUNTER
Medication:   Requested Prescriptions     Pending Prescriptions Disp Refills    SYMBICORT 160-4.5 MCG/ACT AERO [Pharmacy Med Name: SYMBICORT 160-4.5 MCG INHALER] 10.2 g 5     Sig: INHALE TWO PUFFS BY MOUTH TWICE A DAY      Last appt: 2/13/2024   Next appt: 4/15/2024

## 2024-03-14 RX ORDER — BUDESONIDE AND FORMOTEROL FUMARATE DIHYDRATE 160; 4.5 UG/1; UG/1
2 AEROSOL RESPIRATORY (INHALATION) 2 TIMES DAILY
Qty: 10.2 G | Refills: 12 | Status: SHIPPED | OUTPATIENT
Start: 2024-03-14

## 2024-03-27 DIAGNOSIS — I48.20 CHRONIC ATRIAL FIBRILLATION (HCC): ICD-10-CM

## 2024-03-27 NOTE — TELEPHONE ENCOUNTER
Medication:   Requested Prescriptions     Pending Prescriptions Disp Refills    rivaroxaban (XARELTO) 20 MG TABS tablet 90 tablet 0     Sig: Take 1 tablet by mouth daily        Last Filled:      Patient Phone Number: [phone] (home)     Last appt: 2/13/2024   Next appt: 4/15/2024    Last OARRS:        No data to display

## 2024-04-02 NOTE — PROGRESS NOTES
[facility] Pre-Admission Testing Electronic Communication Worksheet for OR/ENDO Procedures        Patient: Haley Sharif    DOS: 4/12    Transportation Confirmed: [x] YES    []  NO    History and Physical:  [] YES    []  NO  [x] N/A  If yes, please list doctor or Urgent Care and date of H&P:     Additional Clearance(Cardiac, Pulmonary, etc):  [] YES    [x]  NO    Pre-Admission Testing Visit:  [] YES    [x]  NO If no, do labs/testing need to be done DOS?  [] YES    [x]  NO    Medication Reconciliation Complete:  [x] YES    []  NO        Additional Notes:          Interview Complete: [x] YES    []  NO          Genna Hager RN  11:06 AM

## 2024-04-02 NOTE — PROGRESS NOTES
[facility] PRE-OPERATIVE INSTRUCTIONS    Day of Procedure:   4/12             Arrival time: 0745               Surgery time:4/12    Take the following medications with a sip of water:  Follow your MD/Surgeons pre-procedure instructions regarding your medications     Do not eat or drink anything after 12:00 midnight prior to your surgery.  This includes water chewing gum, mints and ice chips.   You may brush your teeth and gargle the morning of your surgery, but do not swallow the water     Please see your family doctor/pediatrician for a history and physical and/or concerning medications.   Bring any test results/reports from your physicians office.   If you are under the care of a heart doctor or specialist doctor, please be aware that you may be asked to them for clearance    You may be asked to stop blood thinners such as Coumadin, Plavix, Fragmin, Lovenox, etc., or any anti-inflammatories such as:  Aspirin, Ibuprofen, Advil, Naproxen prior to your surgery.    We also ask that you stop any OTC medications such as fish oil, vitamin E, glucosamine, garlic, Multivitamins, COQ 10, etc.    We ask that you do not smoke 24 hours prior to surgery  We ask that you do not  drink any alcoholic beverages 24 hours prior to surgery     You must make arrangements for a responsible adult to take you home after your surgery.    For your safety you will not be allowed to leave alone or drive yourself home.  Your surgery will be cancelled if you do not have a ride home.     Also for your safety, it is strongly suggested that someone stay with you the first 24 hours after your surgery.     A parent or legal guardian must accompany a child scheduled for surgery and plan to stay at the hospital until the child is discharged.    Please do not bring other children with you.    For your comfort, please wear simple loose fitting clothing to the hospital.  Please do not bring valuables.    Do not wear any make-up or nail

## 2024-04-11 ENCOUNTER — ANESTHESIA EVENT (OUTPATIENT)
Dept: ENDOSCOPY | Age: 64
End: 2024-04-11
Payer: COMMERCIAL

## 2024-04-11 ASSESSMENT — LIFESTYLE VARIABLES: SMOKING_STATUS: 0

## 2024-04-11 NOTE — ANESTHESIA PRE PROCEDURE
Department of Anesthesiology  Preprocedure Note       Name:  Haley Sharif   Age:  64 y.o.  :  1960                                          MRN:  [id number]         Date:  2024      Surgeon: Surgeon(s):  Kwame Almonte MD    Procedure: Procedure(s):  ESOPHAGOGASTRODUODENOSCOPY    Medications prior to admission:   Prior to Admission medications    Medication Sig Start Date End Date Taking? Authorizing Provider   NONFORMULARY Vitamin A   Yes ProviderBabs MD   rivaroxaban (XARELTO) 20 MG TABS tablet Take 1 tablet by mouth daily 3/27/24   Smiley Evans MD   budesonide-formoterol (SYMBICORT) 160-4.5 MCG/ACT AERO INHALE TWO PUFFS BY MOUTH TWICE A DAY 3/14/24   Smiley Evans MD   DULoxetine (CYMBALTA) 60 MG extended release capsule TAKE ONE CAPSULE BY MOUTH DAILY 24   Smiley Evans MD   pantoprazole (PROTONIX) 40 MG tablet Take 1 tablet by mouth every morning (before breakfast) Stop omeprazole 24   Smiley Evans MD   metoprolol tartrate (LOPRESSOR) 25 MG tablet Take 1 tablet by mouth 2 times daily 24   Babs Marquez MD   cetirizine (ZYRTEC) 10 MG tablet Take 1 tablet by mouth daily 24   Smiley Evans MD   fluticasone (FLONASE) 50 MCG/ACT nasal spray 1 spray by Each Nostril route daily 24   Smiley Evans MD   rosuvastatin (CRESTOR) 10 MG tablet Take 1 tablet by mouth nightly 24   Smiley Evans MD   vitamin D (ERGOCALCIFEROL) 1.25 MG (75994 UT) CAPS capsule TAKE 1 CAPSULE BY MOUTH ONCE WEEKLY 24   Smiley Evans MD   ondansetron (ZOFRAN-ODT) 4 MG disintegrating tablet Take 1 tablet by mouth 3 times daily as needed for Nausea or Vomiting 1/15/24   Smiley Evans MD   famotidine (PEPCID) 20 MG tablet Take 1 tablet by mouth 2 times daily 23   Smiley Evans MD   azelastine (ASTELIN) 0.1 % nasal spray 1 spray by Nasal route 2 times daily Use in each nostril as

## 2024-04-12 ENCOUNTER — HOSPITAL ENCOUNTER (OUTPATIENT)
Age: 64
Setting detail: OUTPATIENT SURGERY
Discharge: HOME | End: 2024-04-12
Attending: INTERNAL MEDICINE | Admitting: INTERNAL MEDICINE
Payer: COMMERCIAL

## 2024-04-12 ENCOUNTER — ANESTHESIA (OUTPATIENT)
Dept: ENDOSCOPY | Age: 64
End: 2024-04-12
Payer: COMMERCIAL

## 2024-04-12 VITALS
RESPIRATION RATE: 16 BRPM | DIASTOLIC BLOOD PRESSURE: 60 MMHG | HEIGHT: 72 IN | BODY MASS INDEX: 28.44 KG/M2 | OXYGEN SATURATION: 98 % | SYSTOLIC BLOOD PRESSURE: 132 MMHG | TEMPERATURE: 97 F | WEIGHT: 210 LBS | HEART RATE: 62 BPM

## 2024-04-12 DIAGNOSIS — R13.10 DYSPHAGIA, UNSPECIFIED TYPE: ICD-10-CM

## 2024-04-12 DIAGNOSIS — K21.9 GASTROESOPHAGEAL REFLUX DISEASE, UNSPECIFIED WHETHER ESOPHAGITIS PRESENT: ICD-10-CM

## 2024-04-12 PROCEDURE — 2580000003 HC RX 258: Performed by: ANESTHESIOLOGY

## 2024-04-12 PROCEDURE — 2709999900 HC NON-CHARGEABLE SUPPLY: Performed by: INTERNAL MEDICINE

## 2024-04-12 PROCEDURE — 3609015300 HC ESOPHAGEAL DILATION MALONEY: Performed by: INTERNAL MEDICINE

## 2024-04-12 PROCEDURE — 3609012400 HC EGD TRANSORAL BIOPSY SINGLE/MULTIPLE: Performed by: INTERNAL MEDICINE

## 2024-04-12 PROCEDURE — 7100000010 HC PHASE II RECOVERY - FIRST 15 MIN: Performed by: INTERNAL MEDICINE

## 2024-04-12 PROCEDURE — 2500000003 HC RX 250 WO HCPCS: Performed by: NURSE ANESTHETIST, CERTIFIED REGISTERED

## 2024-04-12 PROCEDURE — 3700000000 HC ANESTHESIA ATTENDED CARE: Performed by: INTERNAL MEDICINE

## 2024-04-12 PROCEDURE — 7100000000 HC PACU RECOVERY - FIRST 15 MIN: Performed by: INTERNAL MEDICINE

## 2024-04-12 PROCEDURE — 3700000001 HC ADD 15 MINUTES (ANESTHESIA): Performed by: INTERNAL MEDICINE

## 2024-04-12 PROCEDURE — 7100000011 HC PHASE II RECOVERY - ADDTL 15 MIN: Performed by: INTERNAL MEDICINE

## 2024-04-12 PROCEDURE — 7100000001 HC PACU RECOVERY - ADDTL 15 MIN: Performed by: INTERNAL MEDICINE

## 2024-04-12 PROCEDURE — 6360000002 HC RX W HCPCS: Performed by: NURSE ANESTHETIST, CERTIFIED REGISTERED

## 2024-04-12 PROCEDURE — 88305 TISSUE EXAM BY PATHOLOGIST: CPT

## 2024-04-12 RX ORDER — IPRATROPIUM BROMIDE AND ALBUTEROL SULFATE 2.5; .5 MG/3ML; MG/3ML
1 SOLUTION RESPIRATORY (INHALATION)
Status: DISCONTINUED | OUTPATIENT
Start: 2024-04-12 | End: 2024-04-12 | Stop reason: HOSPADM

## 2024-04-12 RX ORDER — LABETALOL HYDROCHLORIDE 5 MG/ML
10 INJECTION, SOLUTION INTRAVENOUS
Status: DISCONTINUED | OUTPATIENT
Start: 2024-04-12 | End: 2024-04-12 | Stop reason: HOSPADM

## 2024-04-12 RX ORDER — ONDANSETRON 2 MG/ML
4 INJECTION INTRAMUSCULAR; INTRAVENOUS
Status: DISCONTINUED | OUTPATIENT
Start: 2024-04-12 | End: 2024-04-12 | Stop reason: HOSPADM

## 2024-04-12 RX ORDER — SODIUM CHLORIDE 9 MG/ML
INJECTION, SOLUTION INTRAVENOUS PRN
Status: DISCONTINUED | OUTPATIENT
Start: 2024-04-12 | End: 2024-04-12 | Stop reason: HOSPADM

## 2024-04-12 RX ORDER — HYDRALAZINE HYDROCHLORIDE 20 MG/ML
10 INJECTION INTRAMUSCULAR; INTRAVENOUS
Status: DISCONTINUED | OUTPATIENT
Start: 2024-04-12 | End: 2024-04-12 | Stop reason: HOSPADM

## 2024-04-12 RX ORDER — SODIUM CHLORIDE 0.9 % (FLUSH) 0.9 %
5-40 SYRINGE (ML) INJECTION EVERY 12 HOURS SCHEDULED
Status: DISCONTINUED | OUTPATIENT
Start: 2024-04-12 | End: 2024-04-12 | Stop reason: HOSPADM

## 2024-04-12 RX ORDER — LIDOCAINE HYDROCHLORIDE 20 MG/ML
INJECTION, SOLUTION EPIDURAL; INFILTRATION; INTRACAUDAL; PERINEURAL PRN
Status: DISCONTINUED | OUTPATIENT
Start: 2024-04-12 | End: 2024-04-12 | Stop reason: SDUPTHER

## 2024-04-12 RX ORDER — PROPOFOL 10 MG/ML
INJECTION, EMULSION INTRAVENOUS PRN
Status: DISCONTINUED | OUTPATIENT
Start: 2024-04-12 | End: 2024-04-12 | Stop reason: SDUPTHER

## 2024-04-12 RX ORDER — SODIUM CHLORIDE 0.9 % (FLUSH) 0.9 %
5-40 SYRINGE (ML) INJECTION PRN
Status: DISCONTINUED | OUTPATIENT
Start: 2024-04-12 | End: 2024-04-12 | Stop reason: HOSPADM

## 2024-04-12 RX ADMIN — PROPOFOL 100 MG: 10 INJECTION, EMULSION INTRAVENOUS at 08:42

## 2024-04-12 RX ADMIN — LIDOCAINE HYDROCHLORIDE 100 MG: 20 INJECTION, SOLUTION EPIDURAL; INFILTRATION; INTRACAUDAL; PERINEURAL at 08:42

## 2024-04-12 RX ADMIN — PROPOFOL 50 MG: 10 INJECTION, EMULSION INTRAVENOUS at 08:43

## 2024-04-12 RX ADMIN — PROPOFOL 50 MG: 10 INJECTION, EMULSION INTRAVENOUS at 08:45

## 2024-04-12 RX ADMIN — SODIUM CHLORIDE: 9 INJECTION, SOLUTION INTRAVENOUS at 08:05

## 2024-04-12 ASSESSMENT — PAIN - FUNCTIONAL ASSESSMENT
PAIN_FUNCTIONAL_ASSESSMENT: 0-10
PAIN_FUNCTIONAL_ASSESSMENT: ACTIVITIES ARE NOT PREVENTED
PAIN_FUNCTIONAL_ASSESSMENT: ADULT NONVERBAL PAIN SCALE (NPVS)
PAIN_FUNCTIONAL_ASSESSMENT: ACTIVITIES ARE NOT PREVENTED

## 2024-04-12 ASSESSMENT — PAIN DESCRIPTION - PAIN TYPE
TYPE: ACUTE PAIN
TYPE: ACUTE PAIN

## 2024-04-12 ASSESSMENT — PAIN DESCRIPTION - FREQUENCY
FREQUENCY: INTERMITTENT
FREQUENCY: INTERMITTENT

## 2024-04-12 ASSESSMENT — PAIN DESCRIPTION - ORIENTATION
ORIENTATION: MID
ORIENTATION: MID

## 2024-04-12 ASSESSMENT — PAIN SCALES - GENERAL
PAINLEVEL_OUTOF10: 4
PAINLEVEL_OUTOF10: 7

## 2024-04-12 ASSESSMENT — PAIN DESCRIPTION - DESCRIPTORS
DESCRIPTORS: ACHING
DESCRIPTORS: ACHING

## 2024-04-12 ASSESSMENT — PAIN DESCRIPTION - LOCATION
LOCATION: HEAD
LOCATION: HEAD;THROAT

## 2024-04-12 ASSESSMENT — PAIN DESCRIPTION - ONSET
ONSET: GRADUAL
ONSET: GRADUAL

## 2024-04-12 NOTE — FLOWSHEET NOTE
Case Management Discharge Planning Note    Patient name Jaclyn Michaels  Location /-88 MRN [id number]  : 1943 Date 2023       Current Admission Date: 2023  Current Admission Diagnosis:Acute on chronic respiratory failure with hypoxia and hypercapnia [facility])   Patient Active Problem List    Diagnosis Date Noted   • Bacteremia 2023   • Acute encephalopathy 2023   • Acute kidney injury superimposed on chronic kidney disease ([facility] [address] ) 2023   • Constipation 2023   • History of DVT (deep vein thrombosis) 2023   • Coronary artery disease involving native coronary artery 2023   • Chronic anemia 2023   • Stage 3b chronic kidney disease ([facility] [address] ) 2023   • Acute on chronic congestive heart failure ([facility] [address] ) 2023   • Smoking 2023   • Oxygen dependent 2023   • PAD (peripheral artery disease) ([facility] [address] ) 2023   • Acute hematogenous osteomyelitis of left foot ([facility] [address] ) 2023   • Type 2 diabetes mellitus with skin complication, with long-term current use of insulin ([facility] [address] ) 2023   • COPD (chronic obstructive pulmonary disease) ([facility] [address] ) 2023   • Acute on chronic respiratory failure with hypoxia and hypercapnia ([facility] [address] ) 2023   • Chronic, continuous use of opioids 2023      LOS (days): 4  Geometric Mean LOS (GMLOS) (days): 3 90  Days to GMLOS:0 2     OBJECTIVE:  Risk of Unplanned Readmission Score: 45 33         Current admission status: Inpatient   Preferred Pharmacy:   [id number]/ [address] [place] [id number]  Phone: [phone] Fax: [phone]    Primary Care Provider: Lesia Villar MD    Primary Insurance: Nonda Roll [facility]  Secondary Insurance:     DISCHARGE DETAILS:    Discharge planning discussed with[de-identified] daughterKendy of Choice: Yes  Comments - [place] of Choice: [facility] and [facility]  CM contacted family/caregiver?: Yes  Were Treatment Patient and responsible adult verbalized understanding of discharge instructions, sedation medication, and potential complications including pain. Patient instructed to call Doctor if complications occur.     Team discharge recommendations reviewed with patient/caregiver?: Yes  Did patient/caregiver verbalize understanding of patient care needs?: Yes  Were patient/caregiver advised of the risks associated with not following Treatment Team discharge recommendations?: Yes    Contacts  Patient Contacts: Veronica Murrell, daughter  Relationship to Patient[de-identified] Family  Contact Method: Phone  Phone Number: [phone]  Reason/Outcome: Discharge [address]         Is the patient interested in [facility] at discharge?: Yes  [address] requested[de-identified] Occupational Therapy, Physical Therapy, [address] Name[de-identified] [address] [facility] Provider[de-identified] PCP  Home Health Services Needed[de-identified] Evaluate Functional Status and Safety, Gait/ADL Training, Strengthening/Theraputic Exercises to Improve Function, Other (comment) (Medication Management)  Homebound Criteria Met[de-identified] Uses an Assist Device (i e  cane, walker, etc), Requires the Assistance of Another Person for Safe Ambulation or to Leave the Home  Supporting Clincal Findings[de-identified] Limited Endurance         Other Referral/Resources/Interventions Provided:  Interventions: [facility]  Referral Comments: [facility] (resumption)    Would you like to participate in our [address] service program?  : No - Declined    Treatment Team Recommendation: Home with [address]  Discharge Destination Plan[de-identified] Home with [place] at Discharge : Family                CM contacted daughter, Opal Sheriff, to discuss therapy recommendation for PT and OT  Daughter agreeable to referral to [facility] as family active with agency in past  A post acute care recommendation was made by your care team for [facility]  Discussed Freedom of Choice with POA  Offered POA blanket referral for agencies via telephone  POA aware the list is custom by insurance and patient's medical needs      CM submitted AIDIN referral for [facility]

## 2024-04-12 NOTE — FLOWSHEET NOTE
Son did not respond to text message.   notified.  RN also placed phone call and left a message on cell phone.

## 2024-04-12 NOTE — H&P
Pre-operative History and Physical    Patient: Haley Sharif  : 1960  Acct#:     HISTORY OF PRESENT ILLNESS:    The patient is a 64 y.o. female who presents with dysphagia, GERD. Had dilation 2023 without benefit.     Past Medical History:        Diagnosis Date    Bradycardia     pacemaker-medtronic    Bronchitis     Cancer (HCC)     BCC Rt leg,squamous cell,LEFT LEG,FACE-BASAL CELL    Classic migraine     Degenerated intervertebral disc     GERD (gastroesophageal reflux disease)     History of stomach ulcers     Hx of blood clots     neck,arm after first pacemaker inserted    Hypercholesterolemia     IBS (irritable bowel syndrome)     Nausea & vomiting     Neuropathy     Osteoporosis     Patent foramen ovale     surgery as child    Peripheral vascular disease (HCC)     PONV (postoperative nausea and vomiting)     family hx also of post op n/v    Pulmonary embolism (HCC)     Seizure (HCC)     as a child due to hole in heart-last one     Severe scoliosis     Sick sinus syndrome (HCC)     s/p pacemaker     Sleep apnea     NO C-PAP    Thyroid nodule     benign s/p surgery-GOITER-REMOVED    Wears glasses       Past Surgical History:        Procedure Laterality Date    CARDIAC SURGERY  1965    Congenital heart defect    COLONOSCOPY      SEVERAL    COLONOSCOPY N/A 5/15/2023    COLONOSCOPY POLYPECTOMY SNARE/COLD BIOPSY performed by Efraín Cobb MD at [facility] ENDOSCOPY    ESOPHAGEAL DILATATION N/A 2019    ESOPHAGEAL DILATION VALLECILLO performed by Kwame Almonte MD at [facility] ENDOSCOPY    KNEE ARTHROSCOPY Right     torn meniscous    KNEE SURGERY Left     NASAL FRACTURE SURGERY  1985    PACEMAKER PLACEMENT   &     Placement under stomach muscle    PACEMAKER PLACEMENT  2016    THYROIDECTOMY, PARTIAL Left     d/t goiter    TONSILLECTOMY AND ADENOIDECTOMY  1978    TYMPANOPLASTY Left 2020    LEFT TYMPANOPLASTY performed by Leonel Ugarte MD at [facility] OR

## 2024-04-12 NOTE — FLOWSHEET NOTE
Son not in waiting room or cafeteria.  Call placed to pt's sister to text him to come to surgical waiting desk.

## 2024-04-12 NOTE — DISCHARGE INSTRUCTIONS
Impression:    -Schatzki ring heredia dilated up to 56 Mosotho.  I took multiple biopsies from the proximal and distal esophagus to evaluate for eosinophilic esophagitis.    -2cm sliding hiatal hernia  -Otherwise normal EGD.    Recommendations:   1.  Clear liquid diet, advance as tolerated.  2.  If no benefit with dilation, please call and we will get a barium esophagram to screen for a motility problem.    3. Please check the [facility] patient portal in 1 week for biopsy results. If you do not know how to check this portal, please call our office for instructions.  4.  Continue pantoprazole.      Kwame Almonte MD,   [facility]      Endoscopy Discharge Instructions    Call with any questions or concerns.    You may be drowsy or lightheaded after receiving sedation.  DO NOT operate  a vehicle (automobile, bicycle, motorcycle, machinery, or power tools), no  alcoholic beverages, and do not make any important decisions today.                 Plan on bed rest or quiet relaxation today.  Resume normal activities in the morning.     Resume normal activity tomorrow unless otherwise advised by your physician.            Eat a light first meal, avoiding spicy and fatty foods, then resume normal diet unless  you are told otherwise by your physician.    If the intravenous medication site is painful, apply warm compresses on the site until the soreness is relieved and elevate the arm above the heart. Call your physician if no improvement  in 2-3 days.       POSSIBLE SYMPTOMS TO WATCH:     1. fever (greater than 100) 5. increased abdominal bloating   2. severe pain   6. excessive bleeding   3. nausea and vomiting  7. chest pain   4. chills    8. shortness of breath       Notify us if these problems occur     Expected as normal and remedies:  Sore throat: use over the counter throat lozenges or gargle with warm salt water.  Redness or soreness at the IV site: apply warm compress  Gaseous discomfort: belching or passing

## 2024-04-12 NOTE — H&P
Pre-operative History and Physical    Patient: Haley Sharif  : 1960  Acct#:     HISTORY OF PRESENT ILLNESS:    The patient is a 64 y.o. female who presents with dysphagia, GERD.  Had dilation 2023 without benefit.      Past Medical History:        Diagnosis Date    Bradycardia     pacemaker-medtronic    Bronchitis     Cancer (HCC)     BCC Rt leg,squamous cell,LEFT LEG,FACE-BASAL CELL    Classic migraine     Degenerated intervertebral disc     GERD (gastroesophageal reflux disease)     History of stomach ulcers     Hx of blood clots     neck,arm after first pacemaker inserted    Hypercholesterolemia     IBS (irritable bowel syndrome)     Nausea & vomiting     Neuropathy     Osteoporosis     Patent foramen ovale     surgery as child    Peripheral vascular disease (HCC)     PONV (postoperative nausea and vomiting)     family hx also of post op n/v    Pulmonary embolism (HCC)     Seizure (HCC)     as a child due to hole in heart-last one     Severe scoliosis     Sick sinus syndrome (HCC)     s/p pacemaker     Sleep apnea     NO C-PAP    Thyroid nodule     benign s/p surgery-GOITER-REMOVED    Wears glasses       Past Surgical History:        Procedure Laterality Date    CARDIAC SURGERY  1965    Congenital heart defect    COLONOSCOPY      SEVERAL    COLONOSCOPY N/A 5/15/2023    COLONOSCOPY POLYPECTOMY SNARE/COLD BIOPSY performed by Efraín Cobb MD at [facility] ENDOSCOPY    ESOPHAGEAL DILATATION N/A 2019    ESOPHAGEAL DILATION VALLECILLO performed by Kwame Almonte MD at [facility] ENDOSCOPY    KNEE ARTHROSCOPY Right     torn meniscous    KNEE SURGERY Left     NASAL FRACTURE SURGERY  1985    PACEMAKER PLACEMENT   &     Placement under stomach muscle    PACEMAKER PLACEMENT  2016    THYROIDECTOMY, PARTIAL Left     d/t goiter    TONSILLECTOMY AND ADENOIDECTOMY  1978    TYMPANOPLASTY Left 2020    LEFT TYMPANOPLASTY performed by Leonel Ugarte MD at [facility] OR

## 2024-04-12 NOTE — ANESTHESIA POSTPROCEDURE EVALUATION
Department of Anesthesiology  Postprocedure Note    Patient: Haley Sharif  MRN: [id number]  YOB: 1960  Date of evaluation: 4/12/2024    Procedure Summary       Date: 04/12/24 Room / Location: [place] [address] / [facility]    Anesthesia Start: 0835 Anesthesia Stop: 0854    Procedures:       ESOPHAGOGASTRODUODENOSCOPY BIOPSY      ESOPHAGEAL DILATION AVEL Diagnosis:       Dysphagia, unspecified type      Gastroesophageal reflux disease, unspecified whether esophagitis present      (Dysphagia, unspecified type [R13.10])      (Gastroesophageal reflux disease, unspecified whether esophagitis present [K21.9])    Surgeons: Kwame Almonte MD Responsible Provider: Jensen Orellana MD    Anesthesia Type: MAC ASA Status: 3            Anesthesia Type: MAC    Yaz Phase I: Yaz Score: 10    Yaz Phase II: Yaz Score: 10    Anesthesia Post Evaluation    Patient location during evaluation: PACU  Patient participation: complete - patient participated  Level of consciousness: awake  Airway patency: patent  Nausea & Vomiting: no nausea and no vomiting  Cardiovascular status: hemodynamically stable and blood pressure returned to baseline  Respiratory status: spontaneous ventilation, nonlabored ventilation and room air  Hydration status: stable  Comments: Ms. Sharif was seen resting comfortably following her procedure. Appropriate for discharge home with .   Pain management: adequate    No notable events documented.

## 2024-04-12 NOTE — OP NOTE
Endoscopy Note    Patient: Haley Sharif  : 1960  Acct#:     Procedure: Esophagogastroduodenoscopy with biopsy  Singletary dilation    Date:  2024     Surgeon:  Kwame Almonte MD,     Referring Physician:  Dr. Evans    Indications: This is a 64 y.o. year old female who presents today with dysphagia, GERD.  Had dilation 2023 without benefit.      Anesthesia:  TIVA    Description of Procedure:  Informed consent was obtained from the patient after explanation of indications, benefits and possible risks and complications of the procedure.  The patient was then taken to the endoscopy suite, placed in the left lateral decubitus position and the above IV sedation was administrered.    The Olympus videoendoscope was placed in the patient's mouth and under direct visualization passed into the esophagus and advanced without difficulty to the 2nd portion of the duodenum.  Views were good, patient toleration was good.  Retroflexion was performed in the stomach.      Findings:  1.  Normal hypopharynx.  No zenker's.  Proximal esophagus had a subtle ringed appearance.  I took multiple biopsies from the proximal esophagus and distal esophagus to evaluate for eosinophilic esophagitis.  The esophagus showed a Schatzki ring in the distal esophagus singletary dilated up to 54Fr with mucosal disruption indicative of a successful dilation.  The esophagus otherwise appeared normal without evidence of Winston's esophagus or reflux esophagitis.  2.  2cm sliding hiatal hernia  3.  Normal stomach.  4.  Normal duodenum.     The scope was then withdrawn back into the stomach, it was decompressed, and the scope was completely withdrawn.    The patient tolerated the procedure well and was taken to the post anesthesia care unit in good condition.    Estimated blood loss: minimal  Specimens taken: yes    Impression:    -Schatzki ring singletary dilated up to 56 Macedonian.  I took multiple biopsies from the proximal and distal esophagus  to evaluate for eosinophilic esophagitis.    -2cm sliding hiatal hernia  -Otherwise normal EGD.    Recommendations:   1.  Clear liquid diet, advance as tolerated.  2.  If no benefit with dilation, please call and we will get a barium esophagram to screen for a motility problem.    3. Please check the [facility] patient portal in 1 week for biopsy results. If you do not know how to check this portal, please call our office for instructions.  4.  Continue pantoprazole.      Kwame Almonte MD,   [facility]

## 2024-04-15 ENCOUNTER — OFFICE VISIT (OUTPATIENT)
Dept: PRIMARY CARE CLINIC | Age: 64
End: 2024-04-15
Payer: COMMERCIAL

## 2024-04-15 VITALS
TEMPERATURE: 97 F | WEIGHT: 218.8 LBS | RESPIRATION RATE: 16 BRPM | DIASTOLIC BLOOD PRESSURE: 80 MMHG | HEART RATE: 93 BPM | OXYGEN SATURATION: 94 % | HEIGHT: 72 IN | BODY MASS INDEX: 29.64 KG/M2 | SYSTOLIC BLOOD PRESSURE: 124 MMHG

## 2024-04-15 DIAGNOSIS — M25.512 CHRONIC LEFT SHOULDER PAIN: ICD-10-CM

## 2024-04-15 DIAGNOSIS — E78.2 MIXED HYPERLIPIDEMIA: ICD-10-CM

## 2024-04-15 DIAGNOSIS — M79.2 RADICULAR PAIN IN LEFT ARM: ICD-10-CM

## 2024-04-15 DIAGNOSIS — Z86.711 HISTORY OF PULMONARY EMBOLISM: Primary | ICD-10-CM

## 2024-04-15 DIAGNOSIS — G89.29 CHRONIC LEFT SHOULDER PAIN: ICD-10-CM

## 2024-04-15 DIAGNOSIS — I10 ESSENTIAL HYPERTENSION: ICD-10-CM

## 2024-04-15 DIAGNOSIS — K21.9 GASTROESOPHAGEAL REFLUX DISEASE WITHOUT ESOPHAGITIS: ICD-10-CM

## 2024-04-15 PROBLEM — O88.211 PULMONARY EMBOLISM AFFECTING PREGNANCY IN FIRST TRIMESTER: Status: RESOLVED | Noted: 2019-08-09 | Resolved: 2024-04-15

## 2024-04-15 PROCEDURE — 1123F ACP DISCUSS/DSCN MKR DOCD: CPT | Performed by: INTERNAL MEDICINE

## 2024-04-15 PROCEDURE — 99214 OFFICE O/P EST MOD 30 MIN: CPT | Performed by: INTERNAL MEDICINE

## 2024-04-15 PROCEDURE — 3074F SYST BP LT 130 MM HG: CPT | Performed by: INTERNAL MEDICINE

## 2024-04-15 PROCEDURE — 3079F DIAST BP 80-89 MM HG: CPT | Performed by: INTERNAL MEDICINE

## 2024-04-15 RX ORDER — PANTOPRAZOLE SODIUM 40 MG/1
40 TABLET, DELAYED RELEASE ORAL
Qty: 30 TABLET | Refills: 5 | Status: SHIPPED | OUTPATIENT
Start: 2024-04-15

## 2024-04-15 SDOH — ECONOMIC STABILITY: INCOME INSECURITY: HOW HARD IS IT FOR YOU TO PAY FOR THE VERY BASICS LIKE FOOD, HOUSING, MEDICAL CARE, AND HEATING?: NOT HARD AT ALL

## 2024-04-15 SDOH — ECONOMIC STABILITY: FOOD INSECURITY: WITHIN THE PAST 12 MONTHS, YOU WORRIED THAT YOUR FOOD WOULD RUN OUT BEFORE YOU GOT MONEY TO BUY MORE.: NEVER TRUE

## 2024-04-15 SDOH — ECONOMIC STABILITY: FOOD INSECURITY: WITHIN THE PAST 12 MONTHS, THE FOOD YOU BOUGHT JUST DIDN'T LAST AND YOU DIDN'T HAVE MONEY TO GET MORE.: NEVER TRUE

## 2024-04-15 ASSESSMENT — ENCOUNTER SYMPTOMS
VOMITING: 1
RHINORRHEA: 0
SINUS PRESSURE: 0
EYE REDNESS: 0
CHEST TIGHTNESS: 0
ABDOMINAL PAIN: 0
SORE THROAT: 0
BLOOD IN STOOL: 0
VOICE CHANGE: 0
ABDOMINAL DISTENTION: 0
STRIDOR: 0
BACK PAIN: 0
EYE DISCHARGE: 0
SHORTNESS OF BREATH: 0
CONSTIPATION: 0
EYE PAIN: 0
PHOTOPHOBIA: 0
WHEEZING: 0
DIARRHEA: 1
RECTAL PAIN: 0
CHOKING: 0
NAUSEA: 1
ANAL BLEEDING: 0
ORTHOPNEA: 0
TROUBLE SWALLOWING: 0
EYE ITCHING: 0
COUGH: 0
BLURRED VISION: 0
APNEA: 0

## 2024-04-15 NOTE — PROGRESS NOTES
acute distress.     Appearance: She is well-developed. She is not diaphoretic.   HENT:      Head: Normocephalic and atraumatic.      Right Ear: External ear normal.      Left Ear: External ear normal.   Eyes:      General: No scleral icterus.        Right eye: No discharge.         Left eye: No discharge.      Conjunctiva/sclera: Conjunctivae normal.      Pupils: Pupils are equal, round, and reactive to light.   Neck:      Thyroid: No thyromegaly.      Vascular: No JVD.      Trachea: No tracheal deviation.   Cardiovascular:      Rate and Rhythm: Normal rate.      Heart sounds: Normal heart sounds. No murmur heard.     No gallop.   Pulmonary:      Effort: Pulmonary effort is normal. No respiratory distress.      Breath sounds: Normal breath sounds. No wheezing or rales.   Chest:      Chest wall: No tenderness.   Abdominal:      General: Bowel sounds are normal. There is no distension.      Palpations: Abdomen is soft. There is no mass.      Tenderness: There is no abdominal tenderness. There is no right CVA tenderness, left CVA tenderness, guarding or rebound.   Musculoskeletal:         General: No tenderness. Normal range of motion.      Cervical back: Normal range of motion and neck supple.      Right lower leg: No edema.      Left lower leg: No edema.      Comments:   Good dp pulses.   Lymphadenopathy:      Cervical: No cervical adenopathy.   Skin:     General: Skin is warm and dry.      Coloration: Skin is not pale.      Findings: No erythema or rash.   Neurological:      General: No focal deficit present.      Mental Status: She is alert and oriented to person, place, and time.      Cranial Nerves: No cranial nerve deficit.      Sensory: No sensory deficit.      Motor: No abnormal muscle tone.      Coordination: Coordination normal.      Deep Tendon Reflexes: Reflexes are normal and symmetric. Reflexes normal.   Psychiatric:         Mood and Affect: Mood normal.         Behavior: Behavior normal.         Thought

## 2024-04-15 NOTE — PATIENT INSTRUCTIONS
Buy magox ( magnesium oxide ) . Take 400 mg daily and continue metamucil daily.      Get the RSV and Prevnar 20 at the pharmacy.

## 2024-04-19 PROBLEM — Z98.890 HISTORY OF ESOPHAGOGASTRODUODENOSCOPY (EGD): Status: ACTIVE | Noted: 2017-11-29

## 2024-05-07 DIAGNOSIS — K21.9 GASTROESOPHAGEAL REFLUX DISEASE WITHOUT ESOPHAGITIS: ICD-10-CM

## 2024-05-08 RX ORDER — FAMOTIDINE 20 MG/1
20 TABLET, FILM COATED ORAL 2 TIMES DAILY
Qty: 60 TABLET | Refills: 3 | Status: SHIPPED | OUTPATIENT
Start: 2024-05-08

## 2024-05-08 NOTE — TELEPHONE ENCOUNTER
Medication:   Requested Prescriptions     Pending Prescriptions Disp Refills    famotidine (PEPCID) 20 MG tablet [Pharmacy Med Name: FAMOTIDINE 20 MG TABLET] 60 tablet 3     Sig: TAKE 1 TABLET BY MOUTH TWICE A DAY        Last Filled:      Patient Phone Number: [phone] (home)     Last appt: 4/15/2024   Next appt: 6/17/2024    Last OARRS:        No data to display

## 2024-06-17 ENCOUNTER — OFFICE VISIT (OUTPATIENT)
Dept: PRIMARY CARE CLINIC | Age: 64
End: 2024-06-17
Payer: COMMERCIAL

## 2024-06-17 VITALS
BODY MASS INDEX: 29.72 KG/M2 | SYSTOLIC BLOOD PRESSURE: 128 MMHG | DIASTOLIC BLOOD PRESSURE: 86 MMHG | HEIGHT: 72 IN | HEART RATE: 91 BPM | WEIGHT: 219.4 LBS | RESPIRATION RATE: 16 BRPM | TEMPERATURE: 97.2 F | OXYGEN SATURATION: 98 %

## 2024-06-17 DIAGNOSIS — J01.01 ACUTE RECURRENT MAXILLARY SINUSITIS: ICD-10-CM

## 2024-06-17 DIAGNOSIS — E78.2 MIXED HYPERLIPIDEMIA: ICD-10-CM

## 2024-06-17 DIAGNOSIS — I10 ESSENTIAL HYPERTENSION: ICD-10-CM

## 2024-06-17 DIAGNOSIS — R73.09 ELEVATED GLUCOSE: ICD-10-CM

## 2024-06-17 DIAGNOSIS — I48.0 PAROXYSMAL A-FIB (HCC): Primary | ICD-10-CM

## 2024-06-17 DIAGNOSIS — R00.2 PALPITATIONS: ICD-10-CM

## 2024-06-17 PROCEDURE — 99214 OFFICE O/P EST MOD 30 MIN: CPT | Performed by: INTERNAL MEDICINE

## 2024-06-17 PROCEDURE — 3079F DIAST BP 80-89 MM HG: CPT | Performed by: INTERNAL MEDICINE

## 2024-06-17 PROCEDURE — 1123F ACP DISCUSS/DSCN MKR DOCD: CPT | Performed by: INTERNAL MEDICINE

## 2024-06-17 PROCEDURE — 3074F SYST BP LT 130 MM HG: CPT | Performed by: INTERNAL MEDICINE

## 2024-06-17 PROCEDURE — 93000 ELECTROCARDIOGRAM COMPLETE: CPT | Performed by: INTERNAL MEDICINE

## 2024-06-17 RX ORDER — AMOXICILLIN 500 MG/1
500 CAPSULE ORAL 3 TIMES DAILY
Qty: 30 CAPSULE | Refills: 0 | Status: SHIPPED | OUTPATIENT
Start: 2024-06-17 | End: 2024-06-27

## 2024-06-17 ASSESSMENT — ENCOUNTER SYMPTOMS
RHINORRHEA: 0
VOMITING: 1
BACK PAIN: 0
CHEST TIGHTNESS: 0
CHOKING: 0
EYE REDNESS: 0
SORE THROAT: 1
HEMOPTYSIS: 0
SINUS PRESSURE: 0
ABDOMINAL PAIN: 0
COUGH: 1
CONSTIPATION: 0
DIARRHEA: 1
EYE DISCHARGE: 0
NAUSEA: 1
BLOOD IN STOOL: 0
SHORTNESS OF BREATH: 1
ANAL BLEEDING: 0
EYE ITCHING: 0
PHOTOPHOBIA: 0
TROUBLE SWALLOWING: 0
ABDOMINAL DISTENTION: 0
STRIDOR: 0
APNEA: 0
VOICE CHANGE: 0
RECTAL PAIN: 0
HEARTBURN: 0
WHEEZING: 0
EYE PAIN: 0

## 2024-06-17 NOTE — PROGRESS NOTES
Haley Sharif (:  1960) is a 64 y.o. 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 Went home after event. Tatamy better ./)      Assessment & Plan   ASSESSMENT/PLAN:  1. Paroxysmal A-fib (HCC) recent episodes of palpitations associated with shortness of breath when she was overheated working voluntarily at [employer].  Patient did call cardiologist and they interrogated her pacemaker and she was in atrial flutter and she was to call if symptoms did not resolve.  Symptoms resolved yesterday and no problems with shortness of breath fatigue with exertion.  EKG today showed a paced rhythm without A-fib.  Patient is on Xarelto 20 mg daily.  Instructed history of mild palpitations to take an extra metoprolol.  She takes 25 mg twice a day but can take a total of 3 a day if palpitations.  2. Mixed hyperlipidemia, treated with rosuvastatin 20 mg daily  -     Lipid Panel; Future  3. Essential hypertension controlled with metoprolol see problem #1  BP Readings from Last 3 Encounters:   24 128/86   04/15/24 124/80   24 132/60       -     CBC with Auto Differential; Future  -     Comprehensive Metabolic Panel; Future  -     Urinalysis with Microscopic; Future  4. Elevated glucose had a glucose of 101 with weight gain on a rule out prediabetes.  Will check blood levels.  -     Hemoglobin A1C; Future  -     Fructosamine; Future  5. Palpitations, see problem #1 EKG showed a paced rhythm of the atrial pacemaker  -     EKG 12 lead  6. Acute recurrent maxillary sinusitis  -     amoxicillin (AMOXIL) 500 MG capsule; Take 1 capsule by mouth 3 times daily for 10 days, Disp-30 capsule, R-0Normal      Return in about 3 months (around 2024).         Subjective   SUBJECTIVE/OBJECTIVE:  Cough  Chronicity: productive cough and ethmoid and maxillary sinus pain.

## 2024-06-17 NOTE — PATIENT INSTRUCTIONS
Pure protein shake for breakfast and lunch and low carb dinner.    Low carbohydrate low fat diet:  Drink only water. You can have home made tea or lemonade.  Only william with Stevia  Try to drink 8 glasses of water daily.        Each meal or snack should consist of 3/4 green vegetables and 1/4 lean protein.  Lean protein :  Eggs, fish with fins, chicken or turkey breast without out skin, beef or pork.   Means and poultry should be lean and broiled, so the fat fall away and not consumed by you.           Eat only the berries for fruit , since the berries are low in carbohydrates. You can pick from a variety of berries such as blue berries, black berries, strawberries, raspberries. acai berries and etc.

## 2024-07-16 DIAGNOSIS — Z12.31 ENCOUNTER FOR SCREENING MAMMOGRAM FOR BREAST CANCER: ICD-10-CM

## 2024-07-16 RX ORDER — ERGOCALCIFEROL 1.25 MG/1
50000 CAPSULE ORAL WEEKLY
Qty: 12 CAPSULE | Refills: 1 | Status: SHIPPED | OUTPATIENT
Start: 2024-07-16

## 2024-07-16 NOTE — TELEPHONE ENCOUNTER
Medication:   Requested Prescriptions     Pending Prescriptions Disp Refills    vitamin D (ERGOCALCIFEROL) 1.25 MG (64609 UT) CAPS capsule [Pharmacy Med Name: VITAMIN D2 1.25MG(50,000 UNIT)] 12 capsule 1     Sig: TAKE 1 CAPSULE BY MOUTH ONCE WEEKLY        Last Filled:      Patient Phone Number: [phone] (home)     Last appt: 6/17/2024   Next appt: Visit date not found    Last OARRS:        No data to display

## 2024-07-25 ENCOUNTER — PROCEDURE VISIT (OUTPATIENT)
Dept: AUDIOLOGY | Age: 64
End: 2024-07-25
Payer: COMMERCIAL

## 2024-07-25 ENCOUNTER — OFFICE VISIT (OUTPATIENT)
Dept: ENT CLINIC | Age: 64
End: 2024-07-25
Payer: COMMERCIAL

## 2024-07-25 VITALS
TEMPERATURE: 97.1 F | SYSTOLIC BLOOD PRESSURE: 120 MMHG | OXYGEN SATURATION: 97 % | BODY MASS INDEX: 28.99 KG/M2 | HEIGHT: 72 IN | DIASTOLIC BLOOD PRESSURE: 76 MMHG | WEIGHT: 214 LBS | HEART RATE: 86 BPM

## 2024-07-25 DIAGNOSIS — H90.3 BILATERAL SENSORINEURAL HEARING LOSS: Primary | Chronic | ICD-10-CM

## 2024-07-25 DIAGNOSIS — H66.13 CHRONIC TUBOTYMPANIC SUPPURATIVE OTITIS MEDIA, BILATERAL: Chronic | ICD-10-CM

## 2024-07-25 DIAGNOSIS — H90.3 SENSORINEURAL HEARING LOSS, BILATERAL: Primary | ICD-10-CM

## 2024-07-25 PROCEDURE — 92567 TYMPANOMETRY: CPT | Performed by: AUDIOLOGIST

## 2024-07-25 PROCEDURE — 1123F ACP DISCUSS/DSCN MKR DOCD: CPT | Performed by: OTOLARYNGOLOGY

## 2024-07-25 PROCEDURE — 3074F SYST BP LT 130 MM HG: CPT | Performed by: OTOLARYNGOLOGY

## 2024-07-25 PROCEDURE — 92557 COMPREHENSIVE HEARING TEST: CPT | Performed by: AUDIOLOGIST

## 2024-07-25 PROCEDURE — 3078F DIAST BP <80 MM HG: CPT | Performed by: OTOLARYNGOLOGY

## 2024-07-25 NOTE — PROGRESS NOTES
[facility]   DIVISION OF OTOLARYNGOLOGY- HEAD & NECK SURGERY  [facility]         Chief Complaint   Patient presents with    Hearing Loss    Chronic suppurative otitis media, post bilateral tympanopla       HISTORY       Haley Sharif is a 64 y.o. female here for follow up of hearing loss and chronic middle ear disease status post bilateral tympanoplasty surgery..          EXAMINATION   WDWN, NAD  Ears: TM and EACs appeared to be normal.          AUDIOGRAM  (today):  I reviewed the results of the audiogram, showing bilateral mild to moderate symmetrical, pan frequency sensorineural hearing loss.  There appears to be complete closure of the air-bone gaps in both ears after tympanoplasty surgery.  In addition, the tympanograms are normal bilaterally.  This is an excellent result post tympanoplasty surgery.          COUNSELING    Audiogram results were reviewed and discussed with Haley.  I advised of type and severity of hearing loss and the probable etiology of hearing loss of aging (presbycusis).  I discussed the possible associated impairment.  I advised of the need for avoidance of prolonged or frequent exposure to excessive noise and the need for noise protection, if such exposure is unavoidable.  I discussed the possible benefits of hearing aids, or other amplification therapy.  I discussed the possible etiology of tinnitus and treatment/coping measures including masking techniques.  I advised of the need for annual and prn hearing tests.  Patient was advised of the greater decrease in word and speech understanding in the presence of background noise and of the expected difficulty understanding speech in the presence of moderate to high volume background noise associated with this hearing loss.         IMPRESSION / DIAGNOSES / ORDERS / PROCEDURES    Haley was seen today for hearing loss and chronic suppurative otitis media, post bilateral tympanopla.    Diagnoses and all orders for this

## 2024-07-25 NOTE — PATIENT INSTRUCTIONS
You may proceed with amplification therapy, hearing aid, if you are having difficulty communicating and/or hearing important sounds.  You may follow up with the [facility] audiologist or with another hearing aid provider of your choice.  You will probably have a decrease in understanding of speech in the presence of background noise and expected difficulty understanding speech in the presence of moderate to high level of background noise.  This is typical of your type of hearing loss.      You should return if your ears plug up with ear wax causing difficulty hearing or pressure.  Most patients who use hearing aids, will need their ears cleaned every 6 months.  You may use an over the counter ear wax removal kit (such as Murine, Bausch and Lomb, NeilMed, or Debrox wax removal system) for ear wax removal, as needed.  It may help to use Debrox (OTC) for 4 days prior to future visits for ear cleaning.  This may soften your ear wax and facilitate removal of the wax.    You should return for an annual hearing test to monitor your hearing, and whenever your hearing further decreases significantly.    You should employ the tinnitus masking techniques and strategies we discussed, as needed.  Bedside tinnitus masking devices (e.g. a white noise machine, noise machine, noise darrion on your cell phone, fan on in the room, radio with light music or tuned between stations to white noise static) can be very helpful.    You should avoid exposure to excessively high levels of noise/sound and use hearing protection measures we discussed, as needed, if such exposure is unavoidable.  You may consider the Tinnitus Program through [facility] for problematic tinnitus.    I recommend that you use Lipoflavonoid Plus, (use brand name, not generic, for the first six months), for treatment of your tinnitus.  This is available \"over the counter\" at your pharmacy.  You should take two orally three times a day for the first 60 days, then

## 2024-07-25 NOTE — PROGRESS NOTES
Haley Sharif   1960, 64 y.o. female   [id number]       Referring Provider: Jorge Gallagher DO  Referral Type: In an order in Epic    Reason for Visit: Evaluation of suspected change in hearing and tinnitus     ADULT AUDIOLOGIC EVALUATION      Haley Sharif is a 64 y.o. female seen today, 7/25/2024 , for an initial audiologic evaluation.  Patient was seen by Jorge Gallagher DO following today's evaluation.     AUDIOLOGIC AND OTHER PERTINENT MEDICAL HISTORY:      Haley Sharif noted a perceived gradual decline in hearing, ear pressure and tinnitus, bilaterally. Patient also reports family history of hearing loss. No additional significant otologic or medical history was reported.     Haley Sharif denied otalgia, otorrhea, dizziness, imbalance, history of falls, history of occupational/recreational noise exposure, history of head trauma, and history of ear surgery.    Date: 7/25/2024     IMPRESSIONS:      Today's results revealed a symmetric sensorineural hearing loss with excellent word recognition, bilaterally. Hearing loss significant enough to create hearing difficulty in at least some listening situations. Discussed benefits of amplification.  . Follow medical recommendations of Jorge Gallagher DO.    ASSESSMENT AND FINDINGS:     Otoscopy revealed: Clear ear canals bilaterally    RIGHT EAR:  Hearing Sensitivity: Normal limits through 500Hz sloping to a mild to moderately-severe sensorineural hearing loss.   Speech Recognition Threshold: 30 dB HL  Word Recognition: Excellent 100%, based on NU-6 25-word list at 75 dBHL using recorded speech stimuli.    Tympanometry: Normal peak pressure with high compliance, Type Ad tympanogram, consistent with hypermobile tympanic membrane.      LEFT EAR:  Hearing Sensitivity: Normal limits through 250Hz sloping to a mild to moderately-severe sensorineural hearing loss.   Speech Recognition Threshold: 35 dB HL  Word Recognition: Excellent 100%, based on NU-6

## 2024-08-21 ENCOUNTER — OFFICE VISIT (OUTPATIENT)
Age: 64
End: 2024-08-21

## 2024-08-21 VITALS
WEIGHT: 201.4 LBS | HEART RATE: 73 BPM | OXYGEN SATURATION: 95 % | HEIGHT: 72 IN | BODY MASS INDEX: 27.28 KG/M2 | DIASTOLIC BLOOD PRESSURE: 82 MMHG | SYSTOLIC BLOOD PRESSURE: 130 MMHG | TEMPERATURE: 98.2 F

## 2024-08-21 DIAGNOSIS — S89.91XA INJURY OF RIGHT KNEE, INITIAL ENCOUNTER: Primary | ICD-10-CM

## 2024-08-21 DIAGNOSIS — S49.91XA INJURY OF RIGHT SHOULDER, INITIAL ENCOUNTER: ICD-10-CM
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
## 2024-08-21 DIAGNOSIS — S99.921A INJURY OF RIGHT FOOT, INITIAL ENCOUNTER: ICD-10-CM
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
## 2024-08-21 ASSESSMENT — ENCOUNTER SYMPTOMS: SHORTNESS OF BREATH: 0

## 2024-08-21 NOTE — PATIENT INSTRUCTIONS
Take tylenol and/or ibuprofen for pain relief.  Continue to ice and elevate right knee/foot.  Follow-up with Ortho referral given today as soon as possible.  We will call with the x-ray results.  Return for severe/worsening symptoms.

## 2024-08-21 NOTE — PROGRESS NOTES
Haley Sharif (:  1960) is a 64 y.o. female,New patient, here for evaluation of the following chief complaint(s):  Knee Injury (PT. STATES YESTERDAY SHE FELL LANDED ON RT. SIDE BODY C/O RADIATING RT. KNEE PAIN, RT. FOOT PAIN, RT. SHOULDER PAIN.  )    Assessment & Plan :  Visit Diagnoses and Associated Orders       Injury of right knee, initial encounter    -  Primary    Kenyon Guadalupe PA, Orthopedic Surgery, [facility] [REF62 Custom]      XR KNEE RIGHT (3 VIEWS) [40562 CPT(R)]           Injury of right foot, initial encounter        Kenyon Guadalupe PA, Orthopedic Surgery, [facility] [REF62 Custom]      XR FOOT RIGHT (MIN 3 VIEWS) [19535 CPT(R)]      [employer] Walker Boot, Air Select Low Top, Right; MD (M7-10/F8-11) [HBT0953 Custom]           Injury of right shoulder, initial encounter        Kenyon Guadalupe PA, Orthopedic Surgery, [facility] [REF62 Custom]      XR SHOULDER RIGHT (MIN 2 VIEWS) [09877 CPT(R)]           Fall in home, initial encounter                    Xray Result (most recent):  XR SHOULDER RIGHT (MIN 2 VIEWS) 2024    Narrative  EXAMINATION:  3 X-RAY VIEWS OF THE RIGHT SHOULDER    2024 3:41 pm    COMPARISON:  None.    HISTORY:  ORDERING SYSTEM PROVIDED HISTORY: Injury of right shoulder, FELL YESTERDAY  AND LANDED RT. SIDE BODY; RADIATING PAIN ALL OVER RT. SHOULDER    FINDINGS:  Osseous structures of the right shoulder are intact and aligned normally.  AC  joint space narrowing with marginal osteophytosis reflects AC joint  osteoarthritis.   No retained radiopaque foreign body.  Visualized portions  of the upper outer right hemithorax appear unremarkable.    Impression  1. No acute osseous abnormality.  2. Mild AC joint osteoarthritis.      Differential diagnoses: right foot/knee/shoulder sprain/strain, right foot/knee/shoulder fracture, right shoulder dislocation, right knee dislocation, gout,

## 2024-09-27 ENCOUNTER — PATIENT MESSAGE (OUTPATIENT)
Dept: ENT CLINIC | Age: 64
End: 2024-09-27

## 2024-09-27 DIAGNOSIS — J32.9 RECURRENT SINUSITIS: ICD-10-CM

## 2024-09-27 DIAGNOSIS — R49.0 CHRONIC HOARSENESS: Primary | ICD-10-CM

## 2024-09-30 NOTE — TELEPHONE ENCOUNTER
Please call patient and see if she can come in at 8:20 AM on October 3 or 3:30 PM October 9 if either of those are still open.  Let me know if these do not work out for her.

## 2024-10-02 ENCOUNTER — OFFICE VISIT (OUTPATIENT)
Dept: ENT CLINIC | Age: 64
End: 2024-10-02
Payer: COMMERCIAL

## 2024-10-02 VITALS
HEART RATE: 89 BPM | HEIGHT: 72 IN | TEMPERATURE: 98.6 F | WEIGHT: 213 LBS | DIASTOLIC BLOOD PRESSURE: 83 MMHG | OXYGEN SATURATION: 97 % | SYSTOLIC BLOOD PRESSURE: 131 MMHG | BODY MASS INDEX: 28.85 KG/M2

## 2024-10-02 DIAGNOSIS — E04.9 GOITER: Primary | Chronic | ICD-10-CM

## 2024-10-02 DIAGNOSIS — J01.90 ACUTE RHINOSINUSITIS: ICD-10-CM

## 2024-10-02 PROCEDURE — 3075F SYST BP GE 130 - 139MM HG: CPT | Performed by: OTOLARYNGOLOGY

## 2024-10-02 PROCEDURE — 1123F ACP DISCUSS/DSCN MKR DOCD: CPT | Performed by: OTOLARYNGOLOGY

## 2024-10-02 PROCEDURE — 99213 OFFICE O/P EST LOW 20 MIN: CPT | Performed by: OTOLARYNGOLOGY

## 2024-10-02 PROCEDURE — 3079F DIAST BP 80-89 MM HG: CPT | Performed by: OTOLARYNGOLOGY

## 2024-10-02 NOTE — PROGRESS NOTES
Height: 1.854 m (6' 0.99\")       WDWN, NAD  Face:  Normal skin.    Voice:  Normal, with no hoarseness, breathiness, or hot potato quality.  (+) Ears:     AD:  post surgical changes with no ABEBA TM mobile.  The EAC was normal.    AS:  post surgical changes with no ABEBA TM mobile.  The EAC was normal.    The mastoids and pinnae were normal.    Binaural binocular otomicroscopy was performed.    (+) Nose:  Normal.  Mild purulent exudate in floor of nose bilaterally.    (+) Sinuses: Tender x 4   (+) Throat,  OC/OP:  post tonsillectomy otherwise normal.    (+) Neck:  tenderness in the ant cervical and submandibular neck area wth no mass   Trachea midline.    Nodes:  No cervical lymphadenopathy   (+) Thyroid:  Bilateral goiter left > right nontender.     I performed this physical exam personally.        IMPRESSION / DIAGNOSES / ORDERS:   Haley was seen today for sinusitis, cough, pharyngitis and otalgia.    Diagnoses and all orders for this visit:    Goiter  -     US THYROID    Acute rhinosinusitis         RECOMMENDATIONS / PLAN:   Patient was advised to continue and finish current course of Augmentin.    Thyroid ultrasound.    Return if symptoms worsen or fail to clear up with treatment, or, for any other ENT or sinus problems or symptoms.         Leonel Ugarte MD    [facility]  Department of Otolaryngology/Head and Neck Surgery  [facility]  [address], [address]  [place] [address]  [phone]

## 2024-10-07 ENCOUNTER — HOSPITAL ENCOUNTER (OUTPATIENT)
Dept: ULTRASOUND IMAGING | Age: 64
Discharge: HOME | End: 2024-10-07
Payer: COMMERCIAL

## 2024-10-07 DIAGNOSIS — E04.9 GOITER: ICD-10-CM

## 2024-10-07 PROCEDURE — 76536 US EXAM OF HEAD AND NECK: CPT

## 2024-10-23 ENCOUNTER — OFFICE VISIT (OUTPATIENT)
Dept: ENT CLINIC | Age: 64
End: 2024-10-23

## 2024-10-23 VITALS
BODY MASS INDEX: 28.85 KG/M2 | TEMPERATURE: 98.9 F | HEART RATE: 90 BPM | WEIGHT: 213 LBS | HEIGHT: 72 IN | SYSTOLIC BLOOD PRESSURE: 123 MMHG | OXYGEN SATURATION: 93 % | DIASTOLIC BLOOD PRESSURE: 85 MMHG

## 2024-10-23 DIAGNOSIS — R05.2 SUBACUTE COUGH: ICD-10-CM

## 2024-10-23 DIAGNOSIS — J39.2 MASS OF NASOPHARYNX: ICD-10-CM

## 2024-10-23 DIAGNOSIS — R07.0 THROAT PAIN IN ADULT: ICD-10-CM

## 2024-10-23 DIAGNOSIS — R49.0 HOARSENESS OF VOICE: ICD-10-CM

## 2024-10-23 DIAGNOSIS — J32.9 CHRONIC SINUSITIS, UNSPECIFIED LOCATION: Primary | ICD-10-CM

## 2024-10-23 NOTE — PROGRESS NOTES
CHIEF COMPLAINT  Chief Complaint   Patient presents with    Sinusitis    Hoarse    Cough    Pharyngitis        HISTORY OF PRESENT ILLNESS     Haley Sharif is a 64 y.o. female here for recheck and follow up of the above chief complaint.    \"Better than at last visit but the same as when called on the telephone.  Hoarseness still the same.   Coughing, some of it is really broking up feels wet cough and I gag, and sometimes cough a couple times, sometimes like a coughing fit many times.  Throat is always sore.  Just won't go away, sometime it's worse and sometimes it's is not as bad.\"      REVIEW OF SYSTEMS  Constitutional:  Denied fever and chills.  ENT/sinus:  Denied otalgia, otorrhea, nasal pain, rhinorrhea, and sinus/facial pain.        EXAMINATION    Vitals:    10/23/24 1443   BP: 123/85   Site: Left Upper Arm   Position: Sitting   Cuff Size: Large Adult   Pulse: 90   Temp: 98.9 °F (37.2 °C)   TempSrc: Infrared   SpO2: 93%   Weight: 96.6 kg (213 lb)   Height: 1.854 m (6' 0.99\")     WDWN, NAD  Face:  Normal skin.    Voice:  Normal, with no hoarseness, breathiness, or hot potato quality.  Ears:   TMs and EACs were normal.  The mastoids and pinnae were normal.    (+)  Nose:  Left inferior septal spur.  Mucosal congestions.  No purulent exudate.  Otherwise, normal.    (+)  Sinuses: Tender x 4   Throat,  OC/OP:  Normal.    NASOPHARYNX, MIRROR EXAM:  There appeared to be a mound of tissue in the nasopharynx.  Otherwise, normal bilateral eustachian tube orifices, fossa of Rosenmüller, torus tubarius, and nasopharyngeal walls, with no lesions or masses.   (+)  Neck:  mild to moderate tenderness reported in the anterior neck.  No masses.  Trachea midline.  Normal laryngeal crepitus.  Nodes:  No lymphadenopathy.     Thyroid:  Normal       I performed this physical exam personally.        REVIEW OF IMAGING:           THYROID ULTRASOUND  10/7/2024  FINDINGS:  Right thyroid lobe: Absentcm     Left thyroid lobe: 5.0 x 2.0

## 2024-10-24 ENCOUNTER — TELEPHONE (OUTPATIENT)
Dept: ENT CLINIC | Age: 64
End: 2024-10-24

## 2024-10-24 NOTE — TELEPHONE ENCOUNTER
Patient will reach out to the office once she has her Pre operative imaging scheduled as Dr. Ugarte wanted this to be done prior to scheduling surgery.   Surgery has not been scheduled at this time.

## 2024-10-30 ENCOUNTER — HOSPITAL ENCOUNTER (OUTPATIENT)
Dept: CT IMAGING | Age: 64
Discharge: HOME | End: 2024-10-30
Payer: COMMERCIAL

## 2024-10-30 DIAGNOSIS — J32.9 CHRONIC SINUSITIS, UNSPECIFIED LOCATION: ICD-10-CM

## 2024-10-30 PROCEDURE — 70486 CT MAXILLOFACIAL W/O DYE: CPT

## 2024-11-07 ENCOUNTER — TELEPHONE (OUTPATIENT)
Dept: ENT CLINIC | Age: 64
End: 2024-11-07

## 2024-11-07 NOTE — TELEPHONE ENCOUNTER
Pt called in to schedule surgery, I did inform her that you were out of the office until Monday and would give her a call back to schedule.

## 2024-11-11 ENCOUNTER — PREP FOR PROCEDURE (OUTPATIENT)
Dept: ENT CLINIC | Age: 64
End: 2024-11-11

## 2024-11-11 DIAGNOSIS — J39.2 MASS OF NASOPHARYNX: ICD-10-CM

## 2024-11-20 ENCOUNTER — OFFICE VISIT (OUTPATIENT)
Dept: PRIMARY CARE CLINIC | Age: 64
End: 2024-11-20
Payer: COMMERCIAL

## 2024-11-20 VITALS
DIASTOLIC BLOOD PRESSURE: 70 MMHG | BODY MASS INDEX: 29.09 KG/M2 | HEIGHT: 72 IN | TEMPERATURE: 97.2 F | WEIGHT: 214.8 LBS | RESPIRATION RATE: 16 BRPM | SYSTOLIC BLOOD PRESSURE: 122 MMHG | OXYGEN SATURATION: 98 % | HEART RATE: 69 BPM

## 2024-11-20 DIAGNOSIS — Z01.811 PRE-OP CHEST EXAM: Primary | ICD-10-CM

## 2024-11-20 DIAGNOSIS — R13.14 PHARYNGOESOPHAGEAL DYSPHAGIA: ICD-10-CM

## 2024-11-20 DIAGNOSIS — I10 ESSENTIAL HYPERTENSION: ICD-10-CM

## 2024-11-20 DIAGNOSIS — G43.111 INTRACTABLE MIGRAINE WITH AURA WITH STATUS MIGRAINOSUS: ICD-10-CM

## 2024-11-20 DIAGNOSIS — I48.0 PAROXYSMAL A-FIB (HCC): ICD-10-CM

## 2024-11-20 PROCEDURE — 3078F DIAST BP <80 MM HG: CPT | Performed by: INTERNAL MEDICINE

## 2024-11-20 PROCEDURE — 93000 ELECTROCARDIOGRAM COMPLETE: CPT | Performed by: INTERNAL MEDICINE

## 2024-11-20 PROCEDURE — 99214 OFFICE O/P EST MOD 30 MIN: CPT | Performed by: INTERNAL MEDICINE

## 2024-11-20 PROCEDURE — 3074F SYST BP LT 130 MM HG: CPT | Performed by: INTERNAL MEDICINE

## 2024-11-20 PROCEDURE — 1123F ACP DISCUSS/DSCN MKR DOCD: CPT | Performed by: INTERNAL MEDICINE

## 2024-11-20 RX ORDER — RIMEGEPANT SULFATE 75 MG/75MG
75 TABLET, ORALLY DISINTEGRATING ORAL DAILY PRN
Qty: 16 TABLET | Refills: 4 | Status: SHIPPED | OUTPATIENT
Start: 2024-11-20

## 2024-11-20 RX ORDER — ONDANSETRON 4 MG/1
4 TABLET, ORALLY DISINTEGRATING ORAL 3 TIMES DAILY PRN
Qty: 21 TABLET | Refills: 1 | Status: SHIPPED | OUTPATIENT
Start: 2024-11-20

## 2024-11-20 SDOH — ECONOMIC STABILITY: FOOD INSECURITY: WITHIN THE PAST 12 MONTHS, YOU WORRIED THAT YOUR FOOD WOULD RUN OUT BEFORE YOU GOT MONEY TO BUY MORE.: NEVER TRUE

## 2024-11-20 SDOH — ECONOMIC STABILITY: FOOD INSECURITY: WITHIN THE PAST 12 MONTHS, THE FOOD YOU BOUGHT JUST DIDN'T LAST AND YOU DIDN'T HAVE MONEY TO GET MORE.: NEVER TRUE

## 2024-11-20 SDOH — ECONOMIC STABILITY: INCOME INSECURITY: HOW HARD IS IT FOR YOU TO PAY FOR THE VERY BASICS LIKE FOOD, HOUSING, MEDICAL CARE, AND HEATING?: NOT HARD AT ALL

## 2024-11-20 ASSESSMENT — ENCOUNTER SYMPTOMS
COUGH: 0
APNEA: 0
EYE DISCHARGE: 0
STRIDOR: 0
BLOOD IN STOOL: 0
EYE REDNESS: 0
RECTAL PAIN: 0
NAUSEA: 0
EYE ITCHING: 0
ABDOMINAL PAIN: 0
CHOKING: 0
VOMITING: 0
SINUS PRESSURE: 0
ANAL BLEEDING: 0
VOICE CHANGE: 0
ABDOMINAL DISTENTION: 0
WHEEZING: 0
DIARRHEA: 0
CHEST TIGHTNESS: 0
SORE THROAT: 0
CONSTIPATION: 0
BACK PAIN: 0
SHORTNESS OF BREATH: 0
EYE PAIN: 0
PHOTOPHOBIA: 0
TROUBLE SWALLOWING: 0
RHINORRHEA: 0

## 2024-11-20 ASSESSMENT — PATIENT HEALTH QUESTIONNAIRE - PHQ9
SUM OF ALL RESPONSES TO PHQ QUESTIONS 1-9: 0
SUM OF ALL RESPONSES TO PHQ QUESTIONS 1-9: 0
2. FEELING DOWN, DEPRESSED OR HOPELESS: NOT AT ALL
SUM OF ALL RESPONSES TO PHQ QUESTIONS 1-9: 0
SUM OF ALL RESPONSES TO PHQ9 QUESTIONS 1 & 2: 0
SUM OF ALL RESPONSES TO PHQ QUESTIONS 1-9: 0
1. LITTLE INTEREST OR PLEASURE IN DOING THINGS: NOT AT ALL

## 2024-11-20 NOTE — PROGRESS NOTES
Preoperative Consultation      Haley Sharif  YOB: 1960    Date of Service:  11/20/2024    Vitals:    11/20/24 1338   BP: 122/70   Pulse: 69   Resp: 16   Temp: 97.2 °F (36.2 °C)   TempSrc: Tympanic   SpO2: 98%   Weight: 97.4 kg (214 lb 12.8 oz)   Height: 1.829 m (6')      Wt Readings from Last 2 Encounters:   11/20/24 97.4 kg (214 lb 12.8 oz)   10/23/24 96.6 kg (213 lb)     BP Readings from Last 3 Encounters:   11/20/24 122/70   10/23/24 123/85   10/02/24 131/83        Chief Complaint   Patient presents with    Pre-op Exam     ENDOSCOPIC NASOPHARYNGOSCOPY WITH EXCISION/BIOPSY OF MASS with Leonel Ugarte MD on 12/10/24 at [facility].       Allergies   Allergen Reactions    Adhesive Tape Other (See Comments)     Red & painful-blisters-severe    Keflex [Cephalexin] Nausea And Vomiting    Morphine Anxiety     Hyper. Pt refuse    Cephalosporins Diarrhea and Nausea And Vomiting     Outpatient Medications Marked as Taking for the 11/20/24 encounter (Office Visit) with Smiley Evans MD   Medication Sig Dispense Refill    ondansetron (ZOFRAN-ODT) 4 MG disintegrating tablet Take 1 tablet by mouth 3 times daily as needed for Nausea or Vomiting 21 tablet 1    rimegepant sulfate (NURTEC) 75 MG TBDP Take 75 mg by mouth daily as needed (migraine) 16 tablet 4    vitamin D (ERGOCALCIFEROL) 1.25 MG (55786 UT) CAPS capsule TAKE 1 CAPSULE BY MOUTH ONCE WEEKLY 12 capsule 1    famotidine (PEPCID) 20 MG tablet TAKE 1 TABLET BY MOUTH TWICE A DAY 60 tablet 3    pantoprazole (PROTONIX) 40 MG tablet Take 1 tablet by mouth every morning (before breakfast) Stop omeprazole 30 tablet 5    NONFORMULARY Vitamin A      rivaroxaban (XARELTO) 20 MG TABS tablet Take 1 tablet by mouth daily 90 tablet 0    budesonide-formoterol (SYMBICORT) 160-4.5 MCG/ACT AERO INHALE TWO PUFFS BY MOUTH TWICE A DAY 10.2 g 12    DULoxetine (CYMBALTA) 60 MG extended release capsule TAKE ONE CAPSULE BY MOUTH DAILY 90 capsule 3

## 2024-11-22 ENCOUNTER — TELEPHONE (OUTPATIENT)
Dept: ADMINISTRATIVE | Age: 64
End: 2024-11-22

## 2024-11-22 NOTE — TELEPHONE ENCOUNTER
Submitted PA for Nurtec 75MG dispersible tablets   Via CM (Key: [id number]) STATUS: PENDING.    Follow up done daily; if no decision with in three days we will refax.  If another three days goes by with no decision will call the insurance for status.

## 2024-11-25 DIAGNOSIS — G43.111 INTRACTABLE MIGRAINE WITH AURA WITH STATUS MIGRAINOSUS: ICD-10-CM

## 2024-11-25 DIAGNOSIS — R73.09 ELEVATED GLUCOSE: ICD-10-CM

## 2024-11-25 DIAGNOSIS — Z01.811 PRE-OP CHEST EXAM: ICD-10-CM

## 2024-11-25 DIAGNOSIS — E78.2 MIXED HYPERLIPIDEMIA: ICD-10-CM

## 2024-11-25 DIAGNOSIS — I10 ESSENTIAL HYPERTENSION: ICD-10-CM

## 2024-11-25 LAB
ALBUMIN SERPL-MCNC: 4.3 G/DL (ref 3.4–5)
ALBUMIN/GLOB SERPL: 1.7 {RATIO} (ref 1.1–2.2)
ALP SERPL-CCNC: 122 U/L (ref 40–129)
ALT SERPL-CCNC: 14 U/L (ref 10–40)
ANION GAP SERPL CALCULATED.3IONS-SCNC: 10 MMOL/L (ref 3–16)
AST SERPL-CCNC: 17 U/L (ref 15–37)
BACTERIA URNS QL MICRO: ABNORMAL /HPF
BASOPHILS # BLD: 0 K/UL (ref 0–0.2)
BASOPHILS NFR BLD: 0.7 %
BILIRUB SERPL-MCNC: 0.3 MG/DL (ref 0–1)
BILIRUB UR QL STRIP.AUTO: NEGATIVE
BUN SERPL-MCNC: 19 MG/DL (ref 7–20)
CALCIUM SERPL-MCNC: 9.6 MG/DL (ref 8.3–10.6)
CHLORIDE SERPL-SCNC: 104 MMOL/L (ref 99–110)
CHOLEST SERPL-MCNC: 160 MG/DL (ref 0–199)
CLARITY UR: CLEAR
CO2 SERPL-SCNC: 28 MMOL/L (ref 21–32)
COLOR UR: YELLOW
CREAT SERPL-MCNC: 0.8 MG/DL (ref 0.6–1.2)
DEPRECATED RDW RBC AUTO: 17.8 % (ref 12.4–15.4)
EOSINOPHIL # BLD: 0.3 K/UL (ref 0–0.6)
EOSINOPHIL NFR BLD: 3.6 %
EPI CELLS #/AREA URNS AUTO: 4 /HPF (ref 0–5)
EST. AVERAGE GLUCOSE BLD GHB EST-MCNC: 116.9 MG/DL
GFR SERPLBLD CREATININE-BSD FMLA CKD-EPI: 81 ML/MIN/{1.73_M2}
GLUCOSE SERPL-MCNC: 108 MG/DL (ref 70–99)
GLUCOSE UR STRIP.AUTO-MCNC: NEGATIVE MG/DL
HBA1C MFR BLD: 5.7 %
HCT VFR BLD AUTO: 34 % (ref 36–48)
HDLC SERPL-MCNC: 50 MG/DL (ref 40–60)
HGB BLD-MCNC: 10.6 G/DL (ref 12–16)
HGB UR QL STRIP.AUTO: NEGATIVE
HYALINE CASTS #/AREA URNS AUTO: 3 /LPF (ref 0–8)
KETONES UR STRIP.AUTO-MCNC: NEGATIVE MG/DL
LDLC SERPL CALC-MCNC: 82 MG/DL
LEUKOCYTE ESTERASE UR QL STRIP.AUTO: ABNORMAL
LYMPHOCYTES # BLD: 1.9 K/UL (ref 1–5.1)
LYMPHOCYTES NFR BLD: 27.2 %
MCH RBC QN AUTO: 25 PG (ref 26–34)
MCHC RBC AUTO-ENTMCNC: 31.2 G/DL (ref 31–36)
MCV RBC AUTO: 80.1 FL (ref 80–100)
MONOCYTES # BLD: 0.3 K/UL (ref 0–1.3)
MONOCYTES NFR BLD: 4.8 %
NEUTROPHILS # BLD: 4.4 K/UL (ref 1.7–7.7)
NEUTROPHILS NFR BLD: 63.7 %
NITRITE UR QL STRIP.AUTO: NEGATIVE
PH UR STRIP.AUTO: 8 [PH] (ref 5–8)
PHOSPHATE SERPL-MCNC: 3.2 MG/DL (ref 2.5–4.9)
PLATELET # BLD AUTO: 367 K/UL (ref 135–450)
PMV BLD AUTO: 8.9 FL (ref 5–10.5)
POTASSIUM SERPL-SCNC: 4.8 MMOL/L (ref 3.5–5.1)
PROT SERPL-MCNC: 6.8 G/DL (ref 6.4–8.2)
PROT UR STRIP.AUTO-MCNC: 30 MG/DL
RBC # BLD AUTO: 4.25 M/UL (ref 4–5.2)
RBC CLUMPS #/AREA URNS AUTO: 2 /HPF (ref 0–4)
SODIUM SERPL-SCNC: 142 MMOL/L (ref 136–145)
SP GR UR STRIP.AUTO: 1.02 (ref 1–1.03)
TRIGL SERPL-MCNC: 140 MG/DL (ref 0–150)
UA DIPSTICK W REFLEX MICRO PNL UR: YES
URN SPEC COLLECT METH UR: ABNORMAL
UROBILINOGEN UR STRIP-ACNC: 1 E.U./DL
VLDLC SERPL CALC-MCNC: 28 MG/DL
WBC # BLD AUTO: 6.9 K/UL (ref 4–11)
WBC #/AREA URNS AUTO: 4 /HPF (ref 0–5)

## 2024-11-25 NOTE — TELEPHONE ENCOUNTER
The medication was DENIED; DENIAL letter is uploaded to MEDIA.    Generic Denial: Patient must complete step therapy, Unless there is a contraindication that you can provide.           Note Per your health plan's criteria, this drug is covered if you meet the following: (1) One of the following: (A) You have tried two triptans (for example: eletriptan, rizatriptan, sumatriptan). (B) You cannot use all triptans. The information provided does not show that you meet the criteria listed above.      If you want an APPEAL; please note in this encounter what new information you would like to APPEAL with.  Once complete route back to PA POOL.    If this requires a response please respond to the pool ( P MHCX PSC MEDICATION PRE-AUTH).      Thank you please advise patient.

## 2024-11-27 LAB — FRUCTOSAMINE SERPL-SCNC: 224 UMOL/L (ref 205–285)

## 2024-11-28 LAB
ANNOTATION COMMENT IMP: NORMAL
RETINYL PALMITATE SERPL-MCNC: <0.02 MG/L (ref 0–0.1)
VIT A SERPL-MCNC: 0.51 MG/L (ref 0.3–1.2)

## 2024-12-04 ENCOUNTER — TELEPHONE (OUTPATIENT)
Dept: ENT CLINIC | Age: 64
End: 2024-12-04

## 2024-12-04 ENCOUNTER — PATIENT MESSAGE (OUTPATIENT)
Dept: PRIMARY CARE CLINIC | Age: 64
End: 2024-12-04

## 2024-12-05 NOTE — TELEPHONE ENCOUNTER
Okay great.  I am glad that the cardio PA agreed for her to proceed to the operating room.  That was not mentioned in the previous messaging

## 2024-12-19 NOTE — PROGRESS NOTES
Pt aware of sx date of 12/20/24 with arrival time of 0845.  Preop inst reviewed and all questions answered.

## 2024-12-20 ENCOUNTER — HOSPITAL ENCOUNTER (OUTPATIENT)
Age: 64
Setting detail: OUTPATIENT SURGERY
Discharge: HOME | End: 2024-12-20
Attending: OTOLARYNGOLOGY | Admitting: OTOLARYNGOLOGY
Payer: COMMERCIAL

## 2024-12-20 ENCOUNTER — ANESTHESIA EVENT (OUTPATIENT)
Dept: OPERATING ROOM | Age: 64
End: 2024-12-20
Payer: COMMERCIAL

## 2024-12-20 ENCOUNTER — ANESTHESIA (OUTPATIENT)
Dept: OPERATING ROOM | Age: 64
End: 2024-12-20
Payer: COMMERCIAL

## 2024-12-20 VITALS
HEIGHT: 72 IN | RESPIRATION RATE: 18 BRPM | WEIGHT: 209.9 LBS | OXYGEN SATURATION: 94 % | HEART RATE: 60 BPM | BODY MASS INDEX: 28.43 KG/M2 | DIASTOLIC BLOOD PRESSURE: 59 MMHG | TEMPERATURE: 97.6 F | SYSTOLIC BLOOD PRESSURE: 91 MMHG

## 2024-12-20 DIAGNOSIS — G89.18 POST-OP PAIN: Primary | ICD-10-CM

## 2024-12-20 DIAGNOSIS — J39.2 MASS OF NASOPHARYNX: ICD-10-CM

## 2024-12-20 PROCEDURE — 6370000000 HC RX 637 (ALT 250 FOR IP): Performed by: OTOLARYNGOLOGY

## 2024-12-20 PROCEDURE — 3700000001 HC ADD 15 MINUTES (ANESTHESIA): Performed by: OTOLARYNGOLOGY

## 2024-12-20 PROCEDURE — 2580000003 HC RX 258: Performed by: OTOLARYNGOLOGY

## 2024-12-20 PROCEDURE — 88185 FLOWCYTOMETRY/TC ADD-ON: CPT

## 2024-12-20 PROCEDURE — 3600000004 HC SURGERY LEVEL 4 BASE: Performed by: OTOLARYNGOLOGY

## 2024-12-20 PROCEDURE — 6370000000 HC RX 637 (ALT 250 FOR IP): Performed by: ANESTHESIOLOGY

## 2024-12-20 PROCEDURE — 88184 FLOWCYTOMETRY/ TC 1 MARKER: CPT

## 2024-12-20 PROCEDURE — 2500000003 HC RX 250 WO HCPCS: Performed by: NURSE ANESTHETIST, CERTIFIED REGISTERED

## 2024-12-20 PROCEDURE — 3600000014 HC SURGERY LEVEL 4 ADDTL 15MIN: Performed by: OTOLARYNGOLOGY

## 2024-12-20 PROCEDURE — 3700000000 HC ANESTHESIA ATTENDED CARE: Performed by: OTOLARYNGOLOGY

## 2024-12-20 PROCEDURE — 6360000002 HC RX W HCPCS: Performed by: NURSE ANESTHETIST, CERTIFIED REGISTERED

## 2024-12-20 PROCEDURE — 7100000001 HC PACU RECOVERY - ADDTL 15 MIN: Performed by: OTOLARYNGOLOGY

## 2024-12-20 PROCEDURE — 6360000002 HC RX W HCPCS: Performed by: ANESTHESIOLOGY

## 2024-12-20 PROCEDURE — 2709999900 HC NON-CHARGEABLE SUPPLY: Performed by: OTOLARYNGOLOGY

## 2024-12-20 PROCEDURE — 6370000000 HC RX 637 (ALT 250 FOR IP)

## 2024-12-20 PROCEDURE — 7100000011 HC PHASE II RECOVERY - ADDTL 15 MIN: Performed by: OTOLARYNGOLOGY

## 2024-12-20 PROCEDURE — 7100000010 HC PHASE II RECOVERY - FIRST 15 MIN: Performed by: OTOLARYNGOLOGY

## 2024-12-20 PROCEDURE — 88307 TISSUE EXAM BY PATHOLOGIST: CPT

## 2024-12-20 PROCEDURE — 7100000000 HC PACU RECOVERY - FIRST 15 MIN: Performed by: OTOLARYNGOLOGY

## 2024-12-20 PROCEDURE — 88342 IMHCHEM/IMCYTCHM 1ST ANTB: CPT

## 2024-12-20 RX ORDER — HYDROCODONE BITARTRATE AND ACETAMINOPHEN 5; 325 MG/1; MG/1
1 TABLET ORAL EVERY 4 HOURS PRN
Qty: 30 TABLET | Refills: 0 | Status: SHIPPED | OUTPATIENT
Start: 2024-12-20 | End: 2024-12-25

## 2024-12-20 RX ORDER — IPRATROPIUM BROMIDE AND ALBUTEROL SULFATE 2.5; .5 MG/3ML; MG/3ML
SOLUTION RESPIRATORY (INHALATION)
Status: COMPLETED
Start: 2024-12-20 | End: 2024-12-20

## 2024-12-20 RX ORDER — OXYCODONE HYDROCHLORIDE 5 MG/1
5 TABLET ORAL
Status: COMPLETED | OUTPATIENT
Start: 2024-12-20 | End: 2024-12-20

## 2024-12-20 RX ORDER — APREPITANT 40 MG/1
40 CAPSULE ORAL ONCE
Status: COMPLETED | OUTPATIENT
Start: 2024-12-20 | End: 2024-12-20

## 2024-12-20 RX ORDER — SUCCINYLCHOLINE/SOD CL,ISO/PF 200MG/10ML
SYRINGE (ML) INTRAVENOUS
Status: DISCONTINUED | OUTPATIENT
Start: 2024-12-20 | End: 2024-12-20 | Stop reason: SDUPTHER

## 2024-12-20 RX ORDER — IPRATROPIUM BROMIDE AND ALBUTEROL SULFATE 2.5; .5 MG/3ML; MG/3ML
1 SOLUTION RESPIRATORY (INHALATION) ONCE
Status: COMPLETED | OUTPATIENT
Start: 2024-12-20 | End: 2024-12-20

## 2024-12-20 RX ORDER — ONDANSETRON 2 MG/ML
INJECTION INTRAMUSCULAR; INTRAVENOUS
Status: DISCONTINUED | OUTPATIENT
Start: 2024-12-20 | End: 2024-12-20 | Stop reason: SDUPTHER

## 2024-12-20 RX ORDER — HYDROMORPHONE HYDROCHLORIDE 2 MG/ML
0.5 INJECTION, SOLUTION INTRAMUSCULAR; INTRAVENOUS; SUBCUTANEOUS EVERY 5 MIN PRN
Status: DISCONTINUED | OUTPATIENT
Start: 2024-12-20 | End: 2024-12-20 | Stop reason: HOSPADM

## 2024-12-20 RX ORDER — PROMETHAZINE HYDROCHLORIDE 25 MG/1
25 TABLET ORAL EVERY 6 HOURS PRN
Qty: 15 TABLET | Refills: 0 | Status: SHIPPED | OUTPATIENT
Start: 2024-12-20

## 2024-12-20 RX ORDER — DEXAMETHASONE SODIUM PHOSPHATE 10 MG/ML
INJECTION, SOLUTION INTRAMUSCULAR; INTRAVENOUS
Status: DISCONTINUED | OUTPATIENT
Start: 2024-12-20 | End: 2024-12-20 | Stop reason: SDUPTHER

## 2024-12-20 RX ORDER — MIDAZOLAM HYDROCHLORIDE 1 MG/ML
INJECTION, SOLUTION INTRAMUSCULAR; INTRAVENOUS
Status: DISCONTINUED | OUTPATIENT
Start: 2024-12-20 | End: 2024-12-20 | Stop reason: SDUPTHER

## 2024-12-20 RX ORDER — LIDOCAINE HYDROCHLORIDE 20 MG/ML
INJECTION, SOLUTION INFILTRATION; PERINEURAL
Status: DISCONTINUED | OUTPATIENT
Start: 2024-12-20 | End: 2024-12-20 | Stop reason: SDUPTHER

## 2024-12-20 RX ORDER — MEPERIDINE HYDROCHLORIDE 25 MG/ML
12.5 INJECTION INTRAMUSCULAR; INTRAVENOUS; SUBCUTANEOUS EVERY 5 MIN PRN
Status: DISCONTINUED | OUTPATIENT
Start: 2024-12-20 | End: 2024-12-20 | Stop reason: HOSPADM

## 2024-12-20 RX ORDER — OXYMETAZOLINE HYDROCHLORIDE 0.05 G/100ML
2 SPRAY NASAL
Status: COMPLETED | OUTPATIENT
Start: 2024-12-20 | End: 2024-12-20

## 2024-12-20 RX ORDER — ROCURONIUM BROMIDE 10 MG/ML
INJECTION, SOLUTION INTRAVENOUS
Status: DISCONTINUED | OUTPATIENT
Start: 2024-12-20 | End: 2024-12-20 | Stop reason: SDUPTHER

## 2024-12-20 RX ORDER — SODIUM CHLORIDE 9 MG/ML
INJECTION, SOLUTION INTRAVENOUS CONTINUOUS
Status: DISCONTINUED | OUTPATIENT
Start: 2024-12-20 | End: 2024-12-20 | Stop reason: HOSPADM

## 2024-12-20 RX ORDER — LABETALOL HYDROCHLORIDE 5 MG/ML
10 INJECTION, SOLUTION INTRAVENOUS
Status: DISCONTINUED | OUTPATIENT
Start: 2024-12-20 | End: 2024-12-20 | Stop reason: HOSPADM

## 2024-12-20 RX ORDER — FENTANYL CITRATE 50 UG/ML
INJECTION, SOLUTION INTRAMUSCULAR; INTRAVENOUS
Status: DISCONTINUED | OUTPATIENT
Start: 2024-12-20 | End: 2024-12-20 | Stop reason: SDUPTHER

## 2024-12-20 RX ORDER — ONDANSETRON 2 MG/ML
4 INJECTION INTRAMUSCULAR; INTRAVENOUS
Status: DISCONTINUED | OUTPATIENT
Start: 2024-12-20 | End: 2024-12-20 | Stop reason: HOSPADM

## 2024-12-20 RX ORDER — NALOXONE HYDROCHLORIDE 0.4 MG/ML
INJECTION, SOLUTION INTRAMUSCULAR; INTRAVENOUS; SUBCUTANEOUS PRN
Status: DISCONTINUED | OUTPATIENT
Start: 2024-12-20 | End: 2024-12-20 | Stop reason: HOSPADM

## 2024-12-20 RX ORDER — HYDRALAZINE HYDROCHLORIDE 20 MG/ML
10 INJECTION INTRAMUSCULAR; INTRAVENOUS
Status: DISCONTINUED | OUTPATIENT
Start: 2024-12-20 | End: 2024-12-20 | Stop reason: HOSPADM

## 2024-12-20 RX ORDER — PROPOFOL 10 MG/ML
INJECTION, EMULSION INTRAVENOUS
Status: DISCONTINUED | OUTPATIENT
Start: 2024-12-20 | End: 2024-12-20 | Stop reason: SDUPTHER

## 2024-12-20 RX ADMIN — FENTANYL CITRATE 50 MCG: 50 INJECTION, SOLUTION INTRAMUSCULAR; INTRAVENOUS at 07:42

## 2024-12-20 RX ADMIN — IPRATROPIUM BROMIDE AND ALBUTEROL SULFATE 1 DOSE: .5; 3 SOLUTION RESPIRATORY (INHALATION) at 08:31

## 2024-12-20 RX ADMIN — ONDANSETRON 4 MG: 2 INJECTION INTRAMUSCULAR; INTRAVENOUS at 08:25

## 2024-12-20 RX ADMIN — Medication 120 MG: at 07:43

## 2024-12-20 RX ADMIN — OXYCODONE 5 MG: 5 TABLET ORAL at 09:07

## 2024-12-20 RX ADMIN — ROCURONIUM BROMIDE 10 MG: 10 INJECTION, SOLUTION INTRAVENOUS at 07:42

## 2024-12-20 RX ADMIN — PHENYLEPHRINE HYDROCHLORIDE 100 MCG: 10 INJECTION INTRAVENOUS at 07:46

## 2024-12-20 RX ADMIN — IPRATROPIUM BROMIDE AND ALBUTEROL SULFATE 1 DOSE: 2.5; .5 SOLUTION RESPIRATORY (INHALATION) at 08:31

## 2024-12-20 RX ADMIN — APREPITANT 40 MG: 40 CAPSULE ORAL at 06:39

## 2024-12-20 RX ADMIN — SUGAMMADEX 200 MG: 100 INJECTION, SOLUTION INTRAVENOUS at 08:20

## 2024-12-20 RX ADMIN — PROPOFOL 150 MG: 10 INJECTION, EMULSION INTRAVENOUS at 07:42

## 2024-12-20 RX ADMIN — SODIUM CHLORIDE: 9 INJECTION, SOLUTION INTRAVENOUS at 06:54

## 2024-12-20 RX ADMIN — DEXAMETHASONE SODIUM PHOSPHATE 10 MG: 10 INJECTION, SOLUTION INTRAMUSCULAR; INTRAVENOUS at 08:25

## 2024-12-20 RX ADMIN — OXYMETAZOLINE HYDROCHLORIDE 2 SPRAY: 0.5 SPRAY NASAL at 06:40

## 2024-12-20 RX ADMIN — MIDAZOLAM 2 MG: 1 INJECTION INTRAMUSCULAR; INTRAVENOUS at 07:31

## 2024-12-20 RX ADMIN — PHENYLEPHRINE HYDROCHLORIDE 100 MCG: 10 INJECTION INTRAVENOUS at 07:40

## 2024-12-20 RX ADMIN — LIDOCAINE HYDROCHLORIDE 50 MG: 20 INJECTION, SOLUTION INFILTRATION; PERINEURAL at 07:43

## 2024-12-20 RX ADMIN — ROCURONIUM BROMIDE 20 MG: 10 INJECTION, SOLUTION INTRAVENOUS at 07:46

## 2024-12-20 RX ADMIN — FENTANYL CITRATE 50 MCG: 50 INJECTION, SOLUTION INTRAMUSCULAR; INTRAVENOUS at 08:04

## 2024-12-20 ASSESSMENT — PAIN - FUNCTIONAL ASSESSMENT
PAIN_FUNCTIONAL_ASSESSMENT: ACTIVITIES ARE NOT PREVENTED
PAIN_FUNCTIONAL_ASSESSMENT: 0-10

## 2024-12-20 ASSESSMENT — PAIN SCALES - GENERAL: PAINLEVEL_OUTOF10: 6

## 2024-12-20 ASSESSMENT — PAIN DESCRIPTION - LOCATION: LOCATION: THROAT

## 2024-12-20 ASSESSMENT — LIFESTYLE VARIABLES: SMOKING_STATUS: 0

## 2024-12-20 NOTE — PROGRESS NOTES
Taught incentive spirometry & to use hourly x 10  & sent home with this & compliant, using it.Spoke with Dr Zuniga to resume xarelto in 24 hrs & patient & son verbalized understanding.

## 2024-12-20 NOTE — PROGRESS NOTES
Room air down to 87%, so back to 2 l/min per nasal canulla,& up tp 96%. Ice chips given. Patient c/o throat pain & \" tip of tongue feels burnt\", did not note any tongue cuts or swelling or drainage.

## 2024-12-20 NOTE — PROGRESS NOTES
Dressed, pivoted well to w/c, denied need void yet. Stated ready to go home, 94% room air saturation. No c/o Swallowing water well. Ate 4 ounces jello.

## 2024-12-20 NOTE — BRIEF OP NOTE
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]    Post-Op Diagnosis: Same       Procedure(s):  ENDOSCOPIC NASOPHARYNGOSCOPY WITH EXCISION OF NASOPHARYNGEAL MASS    Surgeon(s):  Leonel Ugarte MD    Assistant:  * No surgical staff found *    Anesthesia: General    Estimated Blood Loss (mL): Minimal    Complications: None    Specimens:   ID Type Source Tests Collected by Time Destination   A : A) NASOPHARYNGEAL MASS Tissue Tissue SURGICAL PATHOLOGY Leonel Ugarte MD 12/20/2024 0757    B : B) NASOPHARYNGEAL MASS TO RULE OUT LYMPHOMA Tissue Tissue SURGICAL PATHOLOGY Leonel Ugarte MD 12/20/2024 0800        Implants:  * No implants in log *      Drains: * No LDAs found *    Findings:  Infection Present At Time Of Surgery (PATOS) (choose all levels that have infection present):  No infection present  Intraoperative findings:  There was a multilobulated fleshy yellow to pink mass in the midline nasopharynx.  The remainder the nasopharynx including bilateral eustachian tube orifices, torus tubarius, and fossa of Rosenmuller appear to be normal.    Electronically signed by Leonel Ugarte MD on 12/20/2024 at 8:18 AM

## 2024-12-20 NOTE — PROGRESS NOTES
Received in PACU Phase 1 Care from OR Procedure Room.  Respirations easy , shallow, regular..  VSS.  Denies any discomfort.  Abdomen soft.Report given to OR RN to complete hand-off procedure.  Oral air way removed by CRNA @0830. ON simple mask, Pulse oximetry 90 % on 10 l/min oxygen, sleepy, Encouraged to C and DB. Dr Goodwin to bedside & Duoneb giveen & 0831, sat up to 100 % @ 0840, Continue to monitor. Eyes closed, responds to verbal stimuli. No c/o.

## 2024-12-20 NOTE — DISCHARGE INSTRUCTIONS
Leonel Ugarte M.D.  [facility], [address]  [place] [id number]  [phone]      POST OPERATIVE INSTRUCTIONS  EXCISION OF MASS OF NASOPHARYNX         SORE THROAT / PAIN  You may experience a sore throat, even with pain medication.  The severity of this pain will vary.  Ear pain occasionally occurs.  It is referred pain from the throat and usually not an ear infection.  The pain medication recommended will help reduce the pain.  Use acetaminophen (Tylenol) for mild to moderate pain.  Use the prescription pain medication for moderate to severe pain.  You may crush the tablets and take in a liquid or in a soft food, like yogurt or apple sauce.       BLEEDING  Occasionally occurs following a nasopharyngeal excision/biopsy.  Generally, it is not serious, but if bleeding should occur you will need to do the following:  Lie quietly with the head elevated  Put ice chips and water in a sandwich bag wrapped in a cloth, such as a cotton stone-shirt, or ice in a wash cloth and place on both sides of the upper neck just below the jaw bone.  Dissolve ice chips in the mouth.  Gargle with ice water.  If bleeding continues longer than 30 minutes and is greater than 1 cup, call [phone] to have Dr. Ugarte pagebrandin.        ACTIVITY  Should be restricted to quiet activities, in the house, for 2 days after surgery.  There should be no vigorous or strenuous physical activity or exercise, heavy lifting, for 10 days following surgery.        FLUIDS  It is important that you drink plenty of fluids, such as non-citrus fruit juices, water, popsicles, ice cream, soda pop, & jello.  If the fluid intake is too low, fever may occur, and there may be excess of crusting in the throat.      BAD BREATH  This may occur after nasopharyngeal biopsy/excision.  It is a reflection of the healing process.  This may be decreased greatly by chewing gum and taking plenty of fluids.       DIET  You may resume your normal diet upon arrival home.   laxative or softener may be needed.  Coughing, sore throat and muscle aches are other side effects of anesthesia and should improve with time.  Do not drive while taking pain medication.

## 2024-12-20 NOTE — ANESTHESIA PRE PROCEDURE
Provider, MD Babs   albuterol sulfate (PROAIR RESPICLICK) 108 (90 Base) MCG/ACT aerosol powder inhalation Inhale 2 puffs into the lungs every 6 hours as needed for Wheezing or Shortness of Breath 8/21/23   Smiley Evans MD   acetaminophen (TYLENOL) 500 MG tablet Take 2 tablets by mouth every 6 hours as needed for Pain    ProviderBabs MD   b complex vitamins capsule Take 1 capsule by mouth daily  Patient not taking: Reported on 12/4/2024    ProviderBabs MD       Current medications:    Current Facility-Administered Medications   Medication Dose Route Frequency Provider Last Rate Last Admin   • 0.9 % sodium chloride infusion   IntraVENous Continuous Leonel Ugarte MD       • oxymetazoline (AFRIN) 0.05 % nasal spray 2 spray  2 spray Each Nostril 60 Min Pre-Op Leonel Ugarte MD           Allergies:    Allergies   Allergen Reactions   • Adhesive Tape Other (See Comments)     Red & painful-blisters-severe   • Keflex [Cephalexin] Nausea And Vomiting   • Morphine Anxiety     Hyper. Pt refuse   • Cephalosporins Diarrhea and Nausea And Vomiting   • Whey Nausea Only     Feels horrible for hours after ingestion       Problem List:    Patient Active Problem List   Diagnosis Code   • Herpes zoster B02.9   • Neuropathy G62.9   • Thyroid nodule E04.1   • Degenerated intervertebral disc [id number]   • Gastroesophageal reflux disease without esophagitis K21.9   • Rheumatoid arthritis of hand (HCC) M06.9   • Scoliosis M41.9   • Chondromalacia of patella M22.40   • Primary localized osteoarthrosis, lower leg M17.10   • Enchondroma of femur D16.20   • Family history of type 2 diabetes mellitus in father Z83.3   • Skin cancer C44.90   • Splenomegaly R16.1   • Primary osteoarthritis of both knees M17.0   • Cough variant asthma J45.991   • Postmenopausal osteoporosis M81.0   • Multiple thyroid nodules E04.2   • S/P partial thyroidectomy E89.0   • History of esophagogastroduodenoscopy (EGD) Z98.890   •

## 2024-12-20 NOTE — H&P
Date of Surgery Update:  Haley Sharif was seen, history and physical examination reviewed, and patient examined by me today. There have been no significant clinical changes since the completion of the previous history and physical.    The risk, benefits, and alternatives of the proposed procedure have been explained to the patient (or appropriate guardian) and understanding verbalized. All questions answered. Patient wishes to proceed.    Electronically signed by: Leonel Ugarte MD,12/20/2024,7:14 AM

## 2024-12-20 NOTE — OP NOTE
Operative Note      Patient: Haley Sharif  YOB: 1960  MRN: [id number]    Date of Procedure: 12/20/2024    Pre-Op Diagnosis Codes:      * Mass of nasopharynx [J39.2]    Post-Op Diagnosis: Same       Procedure(s):  ENDOSCOPIC NASOPHARYNGOSCOPY WITH EXCISION OF NASOPHARYNGEAL MASS    Surgeon(s):  Leonel Ugarte MD    Assistant:   * No surgical staff found *    Anesthesia: General    Estimated Blood Loss (mL): Minimal    Complications: None    Specimens:   ID Type Source Tests Collected by Time Destination   A : A) NASOPHARYNGEAL MASS Tissue Tissue SURGICAL PATHOLOGY Leonel Ugarte MD 12/20/2024 0757    B : B) NASOPHARYNGEAL MASS TO RULE OUT LYMPHOMA Tissue Tissue SURGICAL PATHOLOGY Leonel Ugarte MD 12/20/2024 0800        Implants:  * No implants in log *      Drains: * No LDAs found *       Findings:  Infection Present At Time Of Surgery (PATOS) (choose all levels that have infection present):  No infection present  Other Findings:  There was a multilobulated fleshy yellow to pink mass in the midline nasopharynx.  The remainder the nasopharynx including bilateral eustachian tube orifices, torus tubarius, and fossa of Rosenmuller appear to be normal.         Detailed Description of Procedure:   The adequately prepared patient was taken to the operating room and placed in the supine position on the operating room table.  The attending anesthetist induced an adequate level of general anesthesia and intubated the patient with an oroendotracheal tube, and monitored the patient maintaining an adequate level of general anesthesia throughout the case.   A time out was held and the patient's identify and procedure were confirmed.   The patient was then positioned and prepped and draped in a sterile manner.      The Joleen-Nelson mouthgag was inserted over the tongue and opened to expose the oropharynx and suspended from Kaplan stand.  The nasal telescope was then inserted through the right nares and

## 2024-12-20 NOTE — ANESTHESIA POSTPROCEDURE EVALUATION
Department of Anesthesiology  Postprocedure Note    Patient: Haley Sharif  MRN: [id number]  YOB: 1960  Date of evaluation: 12/20/2024    Procedure Summary       Date: 12/20/24 Room / Location: [address]    Anesthesia Start: 0732 Anesthesia Stop: 0837    Procedure: ENDOSCOPIC NASOPHARYNGOSCOPY WITH EXCISION OF NASOPHARYNGEAL MASS (Nose) Diagnosis:       Mass of nasopharynx      (Mass of nasopharynx [J39.2])    Surgeons: Leonel Ugarte MD Responsible Provider: Efraín Goodwin MD    Anesthesia Type: general ASA Status: 3            Anesthesia Type: No value filed.    Yaz Phase I: Yaz Score: 10    Yaz Phase II:      Anesthesia Post Evaluation    Patient location during evaluation: PACU  Patient participation: complete - patient participated  Level of consciousness: awake and alert  Airway patency: patent  Nausea & Vomiting: no vomiting and no nausea  Cardiovascular status: hemodynamically stable  Respiratory status: acceptable  Hydration status: stable  Pain management: adequate    No notable events documented.

## 2025-01-08 ENCOUNTER — TELEPHONE (OUTPATIENT)
Dept: ENT CLINIC | Age: 65
End: 2025-01-08

## 2025-01-08 NOTE — TELEPHONE ENCOUNTER
Phone call.  Advised patient of the results of the pathology study showing no evidence of malignancy.  I also discussed the results of her CT scan sinuses which did show some severe right temporomandibular joint arthritis and advised her that she should see her dentist and/or an oral surgeon to address the TMJ arthritis.  He states she is still having some hoarseness and will follow-up with me in the office regarding this if it persists in about a month.

## 2025-01-09 DIAGNOSIS — Z12.31 ENCOUNTER FOR SCREENING MAMMOGRAM FOR BREAST CANCER: ICD-10-CM

## 2025-01-09 NOTE — TELEPHONE ENCOUNTER
Medication:   Requested Prescriptions     Pending Prescriptions Disp Refills    vitamin D (ERGOCALCIFEROL) 1.25 MG (55152 UT) CAPS capsule [Pharmacy Med Name: VIT D2 (ERGOCAL) 1.25MG(50,000U) CP] 12 capsule 1     Sig: TAKE 1 CAPSULE BY MOUTH ONCE WEEKLY       Last Filled:      Patient Phone Number: There are no phone numbers on file.    Last appt: 11/20/2024   Next appt: 2/24/2025    Last Labs DM:   Lab Results   Component Value Date/Time    VITD25 70.1 03/01/2023 08:58 AM    VITD25 49.1 09/16/2019 08:27 AM

## 2025-01-10 RX ORDER — ERGOCALCIFEROL 1.25 MG/1
50000 CAPSULE, LIQUID FILLED ORAL WEEKLY
Qty: 12 CAPSULE | Refills: 1 | Status: SHIPPED | OUTPATIENT
Start: 2025-01-10

## 2025-02-03 DIAGNOSIS — K21.9 GASTROESOPHAGEAL REFLUX DISEASE WITHOUT ESOPHAGITIS: ICD-10-CM

## 2025-02-03 DIAGNOSIS — E78.2 MIXED HYPERLIPIDEMIA: ICD-10-CM

## 2025-02-03 NOTE — TELEPHONE ENCOUNTER
Medication:   Requested Prescriptions     Pending Prescriptions Disp Refills    pantoprazole (PROTONIX) 40 MG tablet [Pharmacy Med Name: PANTOPRAZOLE SOD DR 40 MG TAB] 30 tablet 5     Sig: TAKE 1 TABLET BY MOUTH EVERY MORNING BEFORE BREAKFAST (STOP OMEPRAZOLE)    rosuvastatin (CRESTOR) 10 MG tablet [Pharmacy Med Name: ROSUVASTATIN CALCIUM 10 MG TAB] 90 tablet 3     Sig: TAKE ONE TABLET BY MOUTH ONCE NIGHTLY        Last Filled:      Patient Phone Number: There are no phone numbers on file.    Last appt: 11/20/2024   Next appt: 2/24/2025    Last OARRS:        No data to display

## 2025-02-04 DIAGNOSIS — I10 ESSENTIAL HYPERTENSION: ICD-10-CM

## 2025-02-04 DIAGNOSIS — M17.0 PRIMARY OSTEOARTHRITIS OF BOTH KNEES: ICD-10-CM

## 2025-02-04 RX ORDER — PANTOPRAZOLE SODIUM 40 MG/1
TABLET, DELAYED RELEASE ORAL
Qty: 30 TABLET | Refills: 5 | Status: SHIPPED | OUTPATIENT
Start: 2025-02-04

## 2025-02-04 RX ORDER — ROSUVASTATIN CALCIUM 10 MG/1
10 TABLET, COATED ORAL NIGHTLY
Qty: 90 TABLET | Refills: 3 | Status: SHIPPED | OUTPATIENT
Start: 2025-02-04

## 2025-02-13 ENCOUNTER — TELEPHONE (OUTPATIENT)
Dept: ENT CLINIC | Age: 65
End: 2025-02-13

## 2025-02-13 DIAGNOSIS — I48.92 ATRIAL FLUTTER, UNSPECIFIED TYPE (HCC): ICD-10-CM

## 2025-02-13 DIAGNOSIS — I48.0 PAROXYSMAL A-FIB (HCC): Primary | ICD-10-CM

## 2025-02-13 DIAGNOSIS — Z95.0 PRESENCE OF PERMANENT CARDIAC PACEMAKER: ICD-10-CM

## 2025-02-13 NOTE — TELEPHONE ENCOUNTER
Pt called in stating she is having a severe cough that she can not get rid of and wants to see  before he retires, informed her he did not have anything available and she wanted to me to send a msg regarding appt with him.

## 2025-02-14 ENCOUNTER — TELEPHONE (OUTPATIENT)
Dept: CARDIOLOGY CLINIC | Age: 65
End: 2025-02-14

## 2025-02-14 NOTE — TELEPHONE ENCOUNTER
New Referral - I have not called this patient yet    Referral Dr. Smiley Reilly to Dr. Booker  Not sure this is the right provider to be scheduling with     Paroxysmal A-fib (HCC)  - Atrial flutter, unspecified type (HCC)  - Presence of permanent cardiac pacemaker     Please advise time/date

## 2025-02-19 DIAGNOSIS — M79.2 NEUROPATHIC PAIN: ICD-10-CM

## 2025-02-19 ASSESSMENT — PATIENT HEALTH QUESTIONNAIRE - PHQ9
SUM OF ALL RESPONSES TO PHQ9 QUESTIONS 1 & 2: 0
2. FEELING DOWN, DEPRESSED OR HOPELESS: NOT AT ALL
1. LITTLE INTEREST OR PLEASURE IN DOING THINGS: NOT AT ALL
1. LITTLE INTEREST OR PLEASURE IN DOING THINGS: NOT AT ALL
SUM OF ALL RESPONSES TO PHQ QUESTIONS 1-9: 0
2. FEELING DOWN, DEPRESSED OR HOPELESS: NOT AT ALL
SUM OF ALL RESPONSES TO PHQ9 QUESTIONS 1 & 2: 0
SUM OF ALL RESPONSES TO PHQ QUESTIONS 1-9: 0

## 2025-02-19 NOTE — TELEPHONE ENCOUNTER
Medication:   Requested Prescriptions     Pending Prescriptions Disp Refills    DULoxetine (CYMBALTA) 60 MG extended release capsule [Pharmacy Med Name: DULoxetine HCL DR 60 MG CAPSULE] 90 capsule 3     Sig: TAKE 1 CAPSULE BY MOUTH DAILY        Last Filled:      Patient Phone Number: There are no phone numbers on file.    Last appt: 11/20/2024   Next appt: 2/24/2025    Last OARRS:        No data to display

## 2025-02-20 RX ORDER — DULOXETIN HYDROCHLORIDE 60 MG/1
CAPSULE, DELAYED RELEASE ORAL
Qty: 90 CAPSULE | Refills: 3 | Status: SHIPPED | OUTPATIENT
Start: 2025-02-20

## 2025-02-23 SDOH — ECONOMIC STABILITY: FOOD INSECURITY: WITHIN THE PAST 12 MONTHS, THE FOOD YOU BOUGHT JUST DIDN'T LAST AND YOU DIDN'T HAVE MONEY TO GET MORE.: NEVER TRUE

## 2025-02-23 SDOH — ECONOMIC STABILITY: FOOD INSECURITY: WITHIN THE PAST 12 MONTHS, YOU WORRIED THAT YOUR FOOD WOULD RUN OUT BEFORE YOU GOT MONEY TO BUY MORE.: NEVER TRUE

## 2025-02-23 SDOH — ECONOMIC STABILITY: TRANSPORTATION INSECURITY
IN THE PAST 12 MONTHS, HAS THE LACK OF TRANSPORTATION KEPT YOU FROM MEDICAL APPOINTMENTS OR FROM GETTING MEDICATIONS?: NO

## 2025-02-23 SDOH — ECONOMIC STABILITY: INCOME INSECURITY: IN THE LAST 12 MONTHS, WAS THERE A TIME WHEN YOU WERE NOT ABLE TO PAY THE MORTGAGE OR RENT ON TIME?: NO

## 2025-02-23 SDOH — ECONOMIC STABILITY: TRANSPORTATION INSECURITY
IN THE PAST 12 MONTHS, HAS LACK OF TRANSPORTATION KEPT YOU FROM MEETINGS, WORK, OR FROM GETTING THINGS NEEDED FOR DAILY LIVING?: NO

## 2025-02-24 ENCOUNTER — OFFICE VISIT (OUTPATIENT)
Dept: PRIMARY CARE CLINIC | Age: 65
End: 2025-02-24
Payer: COMMERCIAL

## 2025-02-24 VITALS
BODY MASS INDEX: 28.58 KG/M2 | OXYGEN SATURATION: 93 % | TEMPERATURE: 97.2 F | SYSTOLIC BLOOD PRESSURE: 120 MMHG | HEIGHT: 72 IN | DIASTOLIC BLOOD PRESSURE: 82 MMHG | RESPIRATION RATE: 16 BRPM | WEIGHT: 211 LBS | HEART RATE: 90 BPM

## 2025-02-24 DIAGNOSIS — M19.049 HAND ARTHRITIS: ICD-10-CM

## 2025-02-24 DIAGNOSIS — J45.40 MODERATE PERSISTENT ASTHMA WITHOUT COMPLICATION: Primary | ICD-10-CM

## 2025-02-24 DIAGNOSIS — R05.3 CHRONIC COUGH: ICD-10-CM

## 2025-02-24 PROCEDURE — 1123F ACP DISCUSS/DSCN MKR DOCD: CPT | Performed by: INTERNAL MEDICINE

## 2025-02-24 PROCEDURE — 99214 OFFICE O/P EST MOD 30 MIN: CPT | Performed by: INTERNAL MEDICINE

## 2025-02-24 PROCEDURE — 3079F DIAST BP 80-89 MM HG: CPT | Performed by: INTERNAL MEDICINE

## 2025-02-24 PROCEDURE — 3074F SYST BP LT 130 MM HG: CPT | Performed by: INTERNAL MEDICINE

## 2025-02-24 RX ORDER — BUDESONIDE, GLYCOPYRROLATE, AND FORMOTEROL FUMARATE 160; 9; 4.8 UG/1; UG/1; UG/1
2 AEROSOL, METERED RESPIRATORY (INHALATION) EVERY 12 HOURS
Qty: 1 EACH | Refills: 5 | Status: SHIPPED | OUTPATIENT
Start: 2025-02-24

## 2025-02-24 RX ORDER — MONTELUKAST SODIUM 10 MG/1
10 TABLET ORAL NIGHTLY
Qty: 90 TABLET | Refills: 1 | Status: SHIPPED | OUTPATIENT
Start: 2025-02-24

## 2025-02-24 RX ORDER — LORATADINE 10 MG/1
10 TABLET ORAL DAILY
Qty: 90 TABLET | Refills: 1 | Status: SHIPPED | OUTPATIENT
Start: 2025-02-24

## 2025-03-04 ENCOUNTER — TELEPHONE (OUTPATIENT)
Dept: BARIATRICS/WEIGHT MGMT | Age: 65
End: 2025-03-04

## 2025-03-04 NOTE — TELEPHONE ENCOUNTER
Lvm regarding seminar    Pt DOES NOT HAVE coverage with [employer], PLAN EXCLUSION    Bmi scanned into media

## 2025-03-20 NOTE — PROGRESS NOTES
[facility]   Electrophysiology Consultation     Date: 3/20/2025  Reason for Consultation: Sick sinus   Consult Requesting Physician: SELF     CC: Establish Care      HPI: Haley Sharif is a 64 y.o. female with history of pulmonary embolism in 2019, operative sleep apnea, atrial fibrillation, cardioversion in February 2023, atrial flutter, atrial septal defect with repair at age 4, hypertension, Raynaud's, sick sinus syndrome status post single-chamber abdominal pacemaker, generator change in 2008, revision, failure to capture of the atrial and ventricular leads, underwent right sided single-chamber (atrial lead) pacemaker implantation in June 2022, previously followed by [facility] cardiology.    Patient presents today as a new patient for evaluation and management of sick sinus syndrome, pacemaker.  Denies palpitations, racing heart, pain at pacemaker pocket site    Review of System:  [x] Full ROS obtained and negative except as mentioned in HPI or below      Prior to Admission medications    Medication Sig Start Date End Date Taking? Authorizing Provider   montelukast (SINGULAIR) 10 MG tablet Take 1 tablet by mouth nightly 2/24/25   Smiley Evans MD   Budeson-Glycopyrrol-Formoterol (BREZTRI AEROSPHERE) 160-9-4.8 MCG/ACT AERO Inhale 2 puffs into the lungs in the morning and 2 puffs in the evening. Stop symbicort. 2/24/25   Smiley Evans MD   loratadine (CLARITIN) 10 MG tablet Take 1 tablet by mouth daily 2/24/25   Simley Evans MD   DULoxetine (CYMBALTA) 60 MG extended release capsule TAKE 1 CAPSULE BY MOUTH DAILY 2/20/25   Smiley Evans MD   pantoprazole (PROTONIX) 40 MG tablet TAKE 1 TABLET BY MOUTH EVERY MORNING BEFORE BREAKFAST (STOP OMEPRAZOLE) 2/4/25   Smiley Evans MD   rosuvastatin (CRESTOR) 10 MG tablet TAKE ONE TABLET BY MOUTH ONCE NIGHTLY 2/4/25   Smiley Evans MD   vitamin D (ERGOCALCIFEROL) 1.25 MG (09038 UT) CAPS capsule

## 2025-03-22 LAB
ANA SER QL IA: NEGATIVE
CRP SERPL-MCNC: <3 MG/L (ref 0–5.1)
ERYTHROCYTE [SEDIMENTATION RATE] IN BLOOD BY WESTERGREN METHOD: 57 MM/HR (ref 0–30)
RHEUMATOID FACT SER IA-ACNC: 11.7 IU/ML
URATE SERPL-MCNC: 3.8 MG/DL (ref 2.6–6)

## 2025-03-23 ENCOUNTER — RESULTS FOLLOW-UP (OUTPATIENT)
Dept: PRIMARY CARE CLINIC | Age: 65
End: 2025-03-23

## 2025-03-23 DIAGNOSIS — M25.50 ARTHRALGIA, UNSPECIFIED JOINT: ICD-10-CM

## 2025-03-23 DIAGNOSIS — R70.0 ELEVATED SED RATE: Primary | ICD-10-CM

## 2025-03-23 NOTE — RESULT ENCOUNTER NOTE
The gout test was negative.  The rheumatoid arthritis test was negative.  However the inflammation test is elevated I want you to see a rheumatologist.  I placed a referral for you to see a rheumatologist.  Call for an appointment.

## 2025-03-24 ENCOUNTER — OFFICE VISIT (OUTPATIENT)
Dept: PRIMARY CARE CLINIC | Age: 65
End: 2025-03-24
Payer: COMMERCIAL

## 2025-03-24 VITALS
HEART RATE: 81 BPM | BODY MASS INDEX: 28.42 KG/M2 | SYSTOLIC BLOOD PRESSURE: 110 MMHG | OXYGEN SATURATION: 98 % | DIASTOLIC BLOOD PRESSURE: 74 MMHG | WEIGHT: 209.8 LBS | RESPIRATION RATE: 16 BRPM | TEMPERATURE: 97.2 F | HEIGHT: 72 IN

## 2025-03-24 DIAGNOSIS — R10.10 UPPER ABDOMINAL PAIN: ICD-10-CM

## 2025-03-24 DIAGNOSIS — K21.9 GASTROESOPHAGEAL REFLUX DISEASE WITHOUT ESOPHAGITIS: ICD-10-CM

## 2025-03-24 DIAGNOSIS — R13.14 PHARYNGOESOPHAGEAL DYSPHAGIA: ICD-10-CM

## 2025-03-24 DIAGNOSIS — R06.09 DOE (DYSPNEA ON EXERTION): ICD-10-CM

## 2025-03-24 DIAGNOSIS — Z01.419 WELL WOMAN EXAM: ICD-10-CM

## 2025-03-24 DIAGNOSIS — G47.33 OSA (OBSTRUCTIVE SLEEP APNEA): ICD-10-CM

## 2025-03-24 DIAGNOSIS — J30.89 SEASONAL ALLERGIC RHINITIS DUE TO OTHER ALLERGIC TRIGGER: Primary | ICD-10-CM

## 2025-03-24 DIAGNOSIS — L60.0 INGROWING NAIL, RIGHT GREAT TOE: ICD-10-CM

## 2025-03-24 DIAGNOSIS — K52.9 GASTROENTERITIS: ICD-10-CM

## 2025-03-24 PROCEDURE — 3078F DIAST BP <80 MM HG: CPT | Performed by: INTERNAL MEDICINE

## 2025-03-24 PROCEDURE — 3074F SYST BP LT 130 MM HG: CPT | Performed by: INTERNAL MEDICINE

## 2025-03-24 PROCEDURE — 1123F ACP DISCUSS/DSCN MKR DOCD: CPT | Performed by: INTERNAL MEDICINE

## 2025-03-24 PROCEDURE — 99214 OFFICE O/P EST MOD 30 MIN: CPT | Performed by: INTERNAL MEDICINE

## 2025-03-24 RX ORDER — OMEPRAZOLE 40 MG/1
40 CAPSULE, DELAYED RELEASE ORAL
Qty: 30 CAPSULE | Refills: 5 | Status: SHIPPED | OUTPATIENT
Start: 2025-03-24

## 2025-03-24 RX ORDER — ONDANSETRON 4 MG/1
4 TABLET, ORALLY DISINTEGRATING ORAL 3 TIMES DAILY PRN
Qty: 21 TABLET | Refills: 3 | Status: SHIPPED | OUTPATIENT
Start: 2025-03-24

## 2025-03-24 SDOH — ECONOMIC STABILITY: FOOD INSECURITY: WITHIN THE PAST 12 MONTHS, YOU WORRIED THAT YOUR FOOD WOULD RUN OUT BEFORE YOU GOT MONEY TO BUY MORE.: NEVER TRUE

## 2025-03-24 SDOH — ECONOMIC STABILITY: FOOD INSECURITY: WITHIN THE PAST 12 MONTHS, THE FOOD YOU BOUGHT JUST DIDN'T LAST AND YOU DIDN'T HAVE MONEY TO GET MORE.: NEVER TRUE

## 2025-03-24 ASSESSMENT — PATIENT HEALTH QUESTIONNAIRE - PHQ9
SUM OF ALL RESPONSES TO PHQ QUESTIONS 1-9: 0
2. FEELING DOWN, DEPRESSED OR HOPELESS: NOT AT ALL
SUM OF ALL RESPONSES TO PHQ QUESTIONS 1-9: 0
SUM OF ALL RESPONSES TO PHQ QUESTIONS 1-9: 0
1. LITTLE INTEREST OR PLEASURE IN DOING THINGS: NOT AT ALL
SUM OF ALL RESPONSES TO PHQ QUESTIONS 1-9: 0

## 2025-03-25 ENCOUNTER — OFFICE VISIT (OUTPATIENT)
Dept: CARDIOLOGY CLINIC | Age: 65
End: 2025-03-25
Payer: COMMERCIAL

## 2025-03-25 VITALS
WEIGHT: 207.8 LBS | DIASTOLIC BLOOD PRESSURE: 88 MMHG | SYSTOLIC BLOOD PRESSURE: 110 MMHG | HEART RATE: 92 BPM | HEIGHT: 72 IN | BODY MASS INDEX: 28.15 KG/M2 | OXYGEN SATURATION: 98 %

## 2025-03-25 DIAGNOSIS — Z95.0 CARDIAC PACEMAKER IN SITU: ICD-10-CM

## 2025-03-25 DIAGNOSIS — I49.5 SICK SINUS SYNDROME (HCC): ICD-10-CM

## 2025-03-25 DIAGNOSIS — I10 ESSENTIAL HYPERTENSION: Primary | ICD-10-CM

## 2025-03-25 PROCEDURE — 3074F SYST BP LT 130 MM HG: CPT | Performed by: INTERNAL MEDICINE

## 2025-03-25 PROCEDURE — 1123F ACP DISCUSS/DSCN MKR DOCD: CPT | Performed by: INTERNAL MEDICINE

## 2025-03-25 PROCEDURE — 3079F DIAST BP 80-89 MM HG: CPT | Performed by: INTERNAL MEDICINE

## 2025-03-25 PROCEDURE — 99214 OFFICE O/P EST MOD 30 MIN: CPT | Performed by: INTERNAL MEDICINE

## 2025-03-25 PROCEDURE — G2211 COMPLEX E/M VISIT ADD ON: HCPCS | Performed by: INTERNAL MEDICINE

## 2025-03-25 PROCEDURE — 93000 ELECTROCARDIOGRAM COMPLETE: CPT | Performed by: INTERNAL MEDICINE

## 2025-03-26 LAB
A ALTERNATA IGE QN: <0.1 KU/L (ref 0–0.34)
A FUMIGATUS IGE QN: <0.1 KU/L (ref 0–0.34)
AMER SYCAMORE IGE QN: 0.1 KU/L (ref 0–0.34)
BARLEY IGE QN: <0.1 KU/L (ref 0–0.34)
BEEF IGE QN: <0.1 KU/L (ref 0–0.34)
BELL PEPPER IGE QN: <0.1 KU/L (ref 0–0.34)
BERMUDA GRASS IGE QN: <0.1 KU/L (ref 0–0.34)
BOXELDER IGE QN: <0.1 KU/L (ref 0–0.34)
C SPHAEROSPERMUM IGE QN: <0.1 KUL/L (ref 0–0.34)
CABBAGE IGE QN: 0.11 KU/L (ref 0–0.34)
CALIF WALNUT IGE QN: 0.39 KU/L (ref 0–0.34)
CARROT IGE QN: <0.1 KU/L (ref 0–0.34)
CAT DANDER IGE QN: <0.1 KU/L (ref 0–0.34)
CHICKEN SERUM PROT IGE QN: <0.1 KU/L (ref 0–0.34)
CMN PIGWEED IGE QN: <0.1 KU/L (ref 0–0.34)
CODFISH IGE QN: <0.1 KU/L (ref 0–0.34)
COMMON RAGWEED IGE QN: <0.1 KU/L (ref 0–0.34)
CORN IGE QN: 0.12 KU/L (ref 0–0.34)
COTTONWOOD IGE QN: <0.1 KU/L (ref 0–0.34)
COW MILK IGE QN: 0.12 KU/L (ref 0–0.34)
CRAB IGE QN: <0.1 KU/L (ref 0–0.34)
D FARINAE IGE QN: <0.1 KU/L (ref 0–0.34)
D PTERONYSS IGE QN: 0.22 KU/L (ref 0–0.34)
DOG DANDER IGE QN: <0.1 KU/L (ref 0–0.34)
EGG WHITE IGE QN: 0.71 KU/L (ref 0–0.34)
GRAPE IGE QN: <0.1 KU/L (ref 0–0.34)
IGE SERPL-ACNC: 102 IU/ML (ref 0–100)
IGE SERPL-ACNC: 103 IU/ML (ref 0–100)
LETTUCE IGE QN: <0.1 KU/L (ref 0–0.34)
M RACEMOSUS IGE QN: <0.1 KU/L (ref 0–0.34)
MOUSE EPITH IGE QN: <0.1 KU/L (ref 0–0.34)
OAT IGE QN: 0.1 KU/L (ref 0–0.34)
ORANGE IGE QN: <0.1 KU/L (ref 0–0.34)
P NOTATUM IGE QN: <0.1 KU/L (ref 0–0.34)
PEANUT IGE QN: 0.1 KU/L (ref 0–0.34)
PECAN/HICK TREE IGE QN: 0.47 KU/L (ref 0–0.34)
PORK IGE QN: <0.1 KU/L (ref 0–0.34)
POTATO IGE QN: 0.24 KU/L (ref 0–0.34)
RED CEDAR IGE QN: <0.1 KU/L (ref 0–0.34)
RICE IGE QN: <0.1 KU/L (ref 0–0.34)
ROACH IGE QN: <0.1 KU/L (ref 0–0.34)
RYE IGE QN: <0.1 KU/L (ref 0–0.34)
SALTWORT IGE QN: <0.1 KU/L (ref 0–0.34)
SHEEP SORREL IGE QN: <0.1 KU/L (ref 0–0.34)
SHRIMP IGE QN: <0.1 KU/L (ref 0–0.34)
SILVER BIRCH IGE QN: <0.1 KU/L (ref 0–0.34)
SOYBEAN IGE QN: <0.1 KU/L (ref 0–0.34)
TIMOTHY IGE QN: <0.1 KU/L (ref 0–0.34)
TOMATO IGE QN: 0.21 KU/L (ref 0–0.34)
TUNA IGE QN: <0.1 KU/L (ref 0–0.34)
WHEAT IGE QN: 0.15 KU/L (ref 0–0.34)
WHITE ASH IGE QN: <0.1 KU/L (ref 0–0.34)
WHITE BEAN IGE QN: <0.1 KU/L (ref 0–0.34)
WHITE ELM IGE QN: 0.1 KU/L (ref 0–0.34)
WHITE MULBERRY IGE QN: <0.1 KU/L (ref 0–0.34)
WHITE OAK IGE QN: <0.1 KU/L (ref 0–0.34)

## 2025-03-27 NOTE — RESULT ENCOUNTER NOTE
I know it will be hard but try for a month avoiding eggs, dairy, wheat and tomatoes and see if this makes a big difference.  You did have some reaction to these foods on your allergy test.  The largest reaction was to egg whites.

## 2025-03-29 ASSESSMENT — ENCOUNTER SYMPTOMS
EYE DISCHARGE: 0
ABDOMINAL PAIN: 0
PHOTOPHOBIA: 0
EYE PAIN: 0
APNEA: 0
STRIDOR: 0
RHINORRHEA: 0
CHOKING: 0
EYE REDNESS: 0
RECTAL PAIN: 0
BLOOD IN STOOL: 0
CONSTIPATION: 0
ABDOMINAL DISTENTION: 0
NAUSEA: 0
BACK PAIN: 0
ANAL BLEEDING: 0
COUGH: 0
EYE ITCHING: 0
WHEEZING: 0
VOICE CHANGE: 0
SHORTNESS OF BREATH: 0
SINUS PRESSURE: 0
DIARRHEA: 0
SORE THROAT: 0
CHEST TIGHTNESS: 0
VOMITING: 0
TROUBLE SWALLOWING: 0

## 2025-03-29 NOTE — PROGRESS NOTES
Haley Sharif (:  1960) is a 65 y.o. female,Established patient, here for evaluation of the following chief complaint(s):  Results      Assessment & Plan   ASSESSMENT/PLAN:  1. Seasonal allergic rhinitis due to other allergic trigger     Cough.  - Her cough has significantly improved with loratadine, which she now takes at night to avoid daytime drowsiness.  - Reports minimal coughing, especially at night, and has maintained her voice without hoarseness.  - Discussed the effectiveness of loratadine and its impact on her symptoms.  - Continue loratadine at night and monitor for any changes in symptoms.    Also persistent bilateral serous otitis media with- Fluid in the middle ear causing discomfort; history of ear infections and previous tube placements.  - Physical exam reveals air fluid levels in both ears, with the left ear being more painful.  - Discussed the possibility of recurrent ear infections and the need for further evaluation.  - Monitor ear pain and consider referral to an ENT specialist if symptoms persist.    2. Gastroesophageal reflux disease without esophagitis     Gastroesophageal Reflux Disease (GERD).  - Protonix has not been effective for the past month; persistent heartburn and acid reflux despite taking Pepcid.  - No signs of bleeding such as black stool or blood in stools; H. pylori breath test will be conducted.  - Discussed switching back to omeprazole and the need for further diagnostic procedures if symptoms persist.  - Order H. pylori breath test, CBC, CT scan of the abdomen, and lipase test; switch medication to omeprazole.    -     H. Pylori Breath Test; Future  -     omeprazole (PRILOSEC) 40 MG delayed release capsule; Take 1 capsule by mouth every morning (before breakfast), Disp-30 capsule, R-5Normal  3. GODOY (dyspnea on exertion)  -     CT CARDIAC CALCIUM SCORING; Future  4. Pharyngoesophageal dysphagia  -     ondansetron (ZOFRAN-ODT) 4 MG disintegrating tablet; Take 1

## 2025-04-10 ENCOUNTER — TELEPHONE (OUTPATIENT)
Dept: FAMILY MEDICINE | Age: 65
End: 2025-04-10

## 2025-05-28 ENCOUNTER — OFFICE VISIT (OUTPATIENT)
Dept: ORTHOPEDIC SURGERY | Age: 65
End: 2025-05-28
Payer: COMMERCIAL

## 2025-05-28 VITALS — HEIGHT: 72 IN | WEIGHT: 205 LBS | BODY MASS INDEX: 27.77 KG/M2

## 2025-05-28 DIAGNOSIS — M70.61 GREATER TROCHANTERIC BURSITIS OF RIGHT HIP: ICD-10-CM

## 2025-05-28 DIAGNOSIS — R93.7 ABNORMAL X-RAY OF BONE: ICD-10-CM

## 2025-05-28 DIAGNOSIS — M25.551 PAIN OF RIGHT HIP: Primary | ICD-10-CM

## 2025-05-28 PROCEDURE — 1123F ACP DISCUSS/DSCN MKR DOCD: CPT | Performed by: PHYSICIAN ASSISTANT

## 2025-05-28 PROCEDURE — 20610 DRAIN/INJ JOINT/BURSA W/O US: CPT | Performed by: PHYSICIAN ASSISTANT

## 2025-05-28 PROCEDURE — 99213 OFFICE O/P EST LOW 20 MIN: CPT | Performed by: PHYSICIAN ASSISTANT

## 2025-05-28 RX ORDER — TRIAMCINOLONE ACETONIDE 40 MG/ML
40 INJECTION, SUSPENSION INTRA-ARTICULAR; INTRAMUSCULAR ONCE
Status: COMPLETED | OUTPATIENT
Start: 2025-05-28 | End: 2025-05-28

## 2025-05-28 RX ORDER — BUPIVACAINE HYDROCHLORIDE 2.5 MG/ML
2 INJECTION, SOLUTION INFILTRATION; PERINEURAL ONCE
Status: COMPLETED | OUTPATIENT
Start: 2025-05-28 | End: 2025-05-28

## 2025-05-28 RX ADMIN — TRIAMCINOLONE ACETONIDE 40 MG: 40 INJECTION, SUSPENSION INTRA-ARTICULAR; INTRAMUSCULAR at 09:47

## 2025-05-28 RX ADMIN — BUPIVACAINE HYDROCHLORIDE 5 MG: 2.5 INJECTION, SOLUTION INFILTRATION; PERINEURAL at 09:47

## 2025-05-28 NOTE — PROGRESS NOTES
Subjective:      Patient ID: Haley Sharif is a 65 y.o. female who is here for an initial evaluation of right hip pain.    HPI:   She states she developed pain in the right hip about 10 days ago.  She was doing some lifting prior to onset.  She had to lift a box of heavy tools out of a trunk of a car and she was moving flowerpots.  Pain Scale 7/10 VAS.  Location of pain primarily right lateral hip but does occasionally get some buttock pain, right anterior thigh pain, some anterior thigh numbness and tingling.  Pain is worse with activity.   Pain improves with rest.   Previous treatments have included Tylenol without relief.     Review of Systems:  I have reviewed the clinically relevant past medical history, medications, allergies, family history, social history, and 13 point Review of Systems from the patient's recent history form & documented any details relevant to today's presenting complaints in the history above. The patient's self-reported past medical history, medications, allergies, family history, social history, and Review of Systems form from today's date have been scanned into the chart under the \"Media\" tab.    As outlined in the HPI.  Positive for poly-joint pain, swelling and stiffness.  Positive numbness or tingling into the affected extremity.     Past Medical History:   Diagnosis Date    Bradycardia     pacemaker-medtronic    Bronchitis     Cancer (HCC) 2009    BCC Rt leg,squamous cell,LEFT LEG,FACE-BASAL CELL    Classic migraine     Degenerated intervertebral disc     GERD (gastroesophageal reflux disease)     History of stomach ulcers     Hx of blood clots     neck,arm after first pacemaker inserted    Hypercholesterolemia     IBS (irritable bowel syndrome)     Nausea & vomiting     Neuropathy     Osteoporosis     Patent foramen ovale     surgery as child    Peripheral vascular disease     PONV (postoperative nausea and vomiting)     family hx also of post op n/v    Pulmonary embolism (HCC)

## 2025-06-26 ENCOUNTER — APPOINTMENT (OUTPATIENT)
Dept: FAMILY MEDICINE | Age: 65
End: 2025-06-26

## 2025-06-26 VITALS
HEIGHT: 66 IN | TEMPERATURE: 97.8 F | DIASTOLIC BLOOD PRESSURE: 68 MMHG | BODY MASS INDEX: 21.21 KG/M2 | SYSTOLIC BLOOD PRESSURE: 120 MMHG | HEART RATE: 68 BPM | WEIGHT: 132 LBS | RESPIRATION RATE: 16 BRPM

## 2025-06-26 DIAGNOSIS — I67.9 SMALL VESSEL DISEASE, CEREBROVASCULAR: ICD-10-CM

## 2025-06-26 DIAGNOSIS — H81.13 BENIGN PAROXYSMAL POSITIONAL VERTIGO DUE TO BILATERAL VESTIBULAR DISORDER: ICD-10-CM

## 2025-06-26 DIAGNOSIS — R73.9 HYPERGLYCEMIA: ICD-10-CM

## 2025-06-26 DIAGNOSIS — Z12.11 SCREEN FOR COLON CANCER: ICD-10-CM

## 2025-06-26 DIAGNOSIS — J30.9 ALLERGIC RHINITIS, UNSPECIFIED SEASONALITY, UNSPECIFIED TRIGGER: ICD-10-CM

## 2025-06-26 DIAGNOSIS — Z13.6 SCREENING FOR HEART DISEASE: ICD-10-CM

## 2025-06-26 DIAGNOSIS — Z12.31 ENCOUNTER FOR SCREENING MAMMOGRAM FOR MALIGNANT NEOPLASM OF BREAST: ICD-10-CM

## 2025-06-26 DIAGNOSIS — Z00.00 WELCOME TO MEDICARE PREVENTIVE VISIT: Primary | ICD-10-CM

## 2025-06-26 DIAGNOSIS — G43.809 VESTIBULAR MIGRAINE: ICD-10-CM

## 2025-06-26 DIAGNOSIS — Z87.2 HISTORY OF CHRONIC URTICARIA: ICD-10-CM

## 2025-06-26 PROBLEM — R29.818 DIFFICULTY BALANCING: Status: ACTIVE | Noted: 2025-06-26

## 2025-06-26 PROBLEM — R42 LIGHTHEADED: Status: ACTIVE | Noted: 2025-06-26

## 2025-06-26 RX ORDER — CETIRIZINE HYDROCHLORIDE 10 MG/1
TABLET ORAL
COMMUNITY

## 2025-06-26 ASSESSMENT — PATIENT HEALTH QUESTIONNAIRE - PHQ9
1. LITTLE INTEREST OR PLEASURE IN DOING THINGS: NOT AT ALL
2. FEELING DOWN, DEPRESSED OR HOPELESS: NOT AT ALL
SUM OF ALL RESPONSES TO PHQ9 QUESTIONS 1 AND 2: 0
CLINICAL INTERPRETATION OF PHQ2 SCORE: NO FURTHER SCREENING NEEDED
SUM OF ALL RESPONSES TO PHQ9 QUESTIONS 1 AND 2: 0

## 2025-06-30 DIAGNOSIS — R10.10 UPPER ABDOMINAL PAIN: ICD-10-CM

## 2025-07-01 ENCOUNTER — RESULTS FOLLOW-UP (OUTPATIENT)
Dept: PRIMARY CARE CLINIC | Age: 65
End: 2025-07-01

## 2025-07-01 DIAGNOSIS — Z12.31 ENCOUNTER FOR SCREENING MAMMOGRAM FOR BREAST CANCER: ICD-10-CM

## 2025-07-01 LAB
BASOPHILS # BLD: 0.1 K/UL (ref 0–0.2)
BASOPHILS NFR BLD: 0.9 %
DEPRECATED RDW RBC AUTO: 17.4 % (ref 12.4–15.4)
EOSINOPHIL # BLD: 0.2 K/UL (ref 0–0.6)
EOSINOPHIL NFR BLD: 2.3 %
HCT VFR BLD AUTO: 32.6 % (ref 36–48)
HGB BLD-MCNC: 10.2 G/DL (ref 12–16)
LIPASE SERPL-CCNC: 75 U/L (ref 13–60)
LYMPHOCYTES # BLD: 1.8 K/UL (ref 1–5.1)
LYMPHOCYTES NFR BLD: 23.1 %
MCH RBC QN AUTO: 23.9 PG (ref 26–34)
MCHC RBC AUTO-ENTMCNC: 31.3 G/DL (ref 31–36)
MCV RBC AUTO: 76.5 FL (ref 80–100)
MONOCYTES # BLD: 0.4 K/UL (ref 0–1.3)
MONOCYTES NFR BLD: 4.8 %
NEUTROPHILS # BLD: 5.2 K/UL (ref 1.7–7.7)
NEUTROPHILS NFR BLD: 68.9 %
PLATELET # BLD AUTO: 418 K/UL (ref 135–450)
PMV BLD AUTO: 9.1 FL (ref 5–10.5)
RBC # BLD AUTO: 4.26 M/UL (ref 4–5.2)
WBC # BLD AUTO: 7.6 K/UL (ref 4–11)

## 2025-07-01 RX ORDER — FERROUS SULFATE 325(65) MG
325 TABLET, DELAYED RELEASE (ENTERIC COATED) ORAL 2 TIMES DAILY
Qty: 90 TABLET | Refills: 0 | Status: SHIPPED | OUTPATIENT
Start: 2025-07-01

## 2025-07-01 RX ORDER — ERGOCALCIFEROL 1.25 MG/1
50000 CAPSULE, LIQUID FILLED ORAL WEEKLY
Qty: 12 CAPSULE | Refills: 1 | Status: SHIPPED | OUTPATIENT
Start: 2025-07-01

## 2025-07-07 ENCOUNTER — OFFICE VISIT (OUTPATIENT)
Dept: PRIMARY CARE CLINIC | Age: 65
End: 2025-07-07
Payer: COMMERCIAL

## 2025-07-07 VITALS
TEMPERATURE: 97.8 F | BODY MASS INDEX: 27.6 KG/M2 | WEIGHT: 203.8 LBS | SYSTOLIC BLOOD PRESSURE: 112 MMHG | HEIGHT: 72 IN | DIASTOLIC BLOOD PRESSURE: 76 MMHG | HEART RATE: 91 BPM | OXYGEN SATURATION: 97 %

## 2025-07-07 DIAGNOSIS — R06.09 DOE (DYSPNEA ON EXERTION): ICD-10-CM

## 2025-07-07 DIAGNOSIS — R13.19 ESOPHAGEAL DYSPHAGIA: ICD-10-CM

## 2025-07-07 DIAGNOSIS — R74.8 ELEVATED LIPASE: Primary | ICD-10-CM

## 2025-07-07 DIAGNOSIS — R63.4 WEIGHT LOSS: ICD-10-CM

## 2025-07-07 PROCEDURE — 3078F DIAST BP <80 MM HG: CPT | Performed by: INTERNAL MEDICINE

## 2025-07-07 PROCEDURE — 99214 OFFICE O/P EST MOD 30 MIN: CPT | Performed by: INTERNAL MEDICINE

## 2025-07-07 PROCEDURE — 3074F SYST BP LT 130 MM HG: CPT | Performed by: INTERNAL MEDICINE

## 2025-07-07 PROCEDURE — 1123F ACP DISCUSS/DSCN MKR DOCD: CPT | Performed by: INTERNAL MEDICINE

## 2025-07-07 RX ORDER — FOLIC ACID 0.4 MG
40 TABLET ORAL DAILY
COMMUNITY

## 2025-07-07 NOTE — PROGRESS NOTES
Haley Sharif (:  1960) is a 65 y.o. female,Established patient, here for evaluation of the following chief complaint(s):  Gastroesophageal Reflux (3 month follow up ), Discuss Labs, and Hip Pain (Pt states she went to [facility] and was given a Cortizone injection that didn't help that was back in may )    Wt Readings from Last 3 Encounters:   25 92.4 kg (203 lb 12.8 oz)   25 93 kg (205 lb)   25 94.3 kg (207 lb 12.8 oz)      Assessment & Plan   ASSESSMENT/PLAN:  1. Elevated lipase and tender on upper and lower abdominal exam today.  Decreased appetite and some weight loss.  Symptoms are very concerning.  Will get CT of abdomen and pelvis and refer to GI for EGD and colon.  -     CT ABDOMEN PELVIS W IV CONTRAST Additional Contrast? Oral; Future  -     Kwame Sheriff MD, Gastroenterology (ERCP & EUS[facility]  2. Weight loss: Same as problem #1  -     CT ABDOMEN PELVIS W IV CONTRAST Additional Contrast? Oral; Future  -     Kwame Sheriff MD, Gastroenterology (ERCP & EUS), [facility]  3. Esophageal dysphagia with history of benign esophageal stricture had dilatation years ago but recently symptoms have returned referred to GI for upper endoscopy.  -     Kwame Sheriff MD, Gastroenterology (ERCP & EUS), [facility]  4. GODOY (dyspnea on exertion) patient saw hematology and iron deficiency anemia.  Will undergo GI workup as above problem #2.  Also increased cardiovascular risk so we will check CT cardiac calcium scoring.  -     CT CARDIAC CALCIUM SCORING; Future      Return in about 6 weeks (around 2025).         Subjective   SUBJECTIVE/OBJECTIVE:  Abdominal Pain  This is a new problem. The current episode started more than 1 month ago (Symptoms present for the past 3 months). The onset quality is gradual. The problem occurs constantly. Duration: Episode also worse after eating and food gets stuck with swallowing mid chest.

## 2025-07-08 ENCOUNTER — RESULTS FOLLOW-UP (OUTPATIENT)
Dept: FAMILY MEDICINE | Age: 65
End: 2025-07-08

## 2025-07-08 ENCOUNTER — APPOINTMENT (OUTPATIENT)
Dept: LAB | Age: 65
End: 2025-07-08

## 2025-07-08 DIAGNOSIS — I67.9 SMALL VESSEL DISEASE, CEREBROVASCULAR: ICD-10-CM

## 2025-07-08 DIAGNOSIS — R73.9 HYPERGLYCEMIA: ICD-10-CM

## 2025-07-08 DIAGNOSIS — R73.01 IFG (IMPAIRED FASTING GLUCOSE): Primary | ICD-10-CM

## 2025-07-08 DIAGNOSIS — R73.01 IFG (IMPAIRED FASTING GLUCOSE): ICD-10-CM

## 2025-07-08 DIAGNOSIS — Z13.6 SCREENING FOR HEART DISEASE: ICD-10-CM

## 2025-07-08 DIAGNOSIS — H81.13 BENIGN PAROXYSMAL POSITIONAL VERTIGO DUE TO BILATERAL VESTIBULAR DISORDER: ICD-10-CM

## 2025-07-08 LAB
ALBUMIN SERPL-MCNC: 4.2 G/DL (ref 3.4–5)
ALBUMIN/GLOB SERPL: 1.4 {RATIO} (ref 1–2.4)
ALP SERPL-CCNC: 64 UNITS/L (ref 45–117)
ALT SERPL-CCNC: 28 UNITS/L
ANION GAP SERPL CALC-SCNC: 11 MMOL/L (ref 7–19)
AST SERPL-CCNC: 17 UNITS/L
BASOPHILS # BLD: 0 K/MCL (ref 0–0.3)
BASOPHILS NFR BLD: 1 %
BILIRUB SERPL-MCNC: 0.5 MG/DL (ref 0.2–1)
BUN SERPL-MCNC: 14 MG/DL (ref 6–20)
BUN/CREAT SERPL: 17 (ref 7–25)
CALCIUM SERPL-MCNC: 9.2 MG/DL (ref 8.4–10.2)
CHLORIDE SERPL-SCNC: 104 MMOL/L (ref 97–110)
CHOLEST SERPL-MCNC: 225 MG/DL
CHOLEST/HDLC SERPL: 2.6 {RATIO}
CO2 SERPL-SCNC: 26 MMOL/L (ref 21–32)
CREAT SERPL-MCNC: 0.84 MG/DL (ref 0.51–0.95)
DEPRECATED RDW RBC: 43.1 FL (ref 39–50)
EGFRCR SERPLBLD CKD-EPI 2021: 77 ML/MIN/{1.73_M2}
EOSINOPHIL # BLD: 0.1 K/MCL (ref 0–0.5)
EOSINOPHIL NFR BLD: 2 %
ERYTHROCYTE [DISTWIDTH] IN BLOOD: 13.2 % (ref 11–15)
FASTING DURATION TIME PATIENT: ABNORMAL H
GLOBULIN SER-MCNC: 2.9 G/DL (ref 2–4)
GLUCOSE SERPL-MCNC: 119 MG/DL (ref 70–99)
HBA1C MFR BLD: 6 % (ref 4.5–5.6)
HCT VFR BLD CALC: 41.8 % (ref 36–46.5)
HDLC SERPL-MCNC: 85 MG/DL
HGB BLD-MCNC: 14 G/DL (ref 12–15.5)
IMM GRANULOCYTES # BLD AUTO: 0 K/MCL (ref 0–0.2)
IMM GRANULOCYTES # BLD: 0 %
LDLC SERPL CALC-MCNC: 124 MG/DL
LYMPHOCYTES # BLD: 1.9 K/MCL (ref 1–4)
LYMPHOCYTES NFR BLD: 25 %
MCH RBC QN AUTO: 29.7 PG (ref 26–34)
MCHC RBC AUTO-ENTMCNC: 33.5 G/DL (ref 32–36.5)
MCV RBC AUTO: 88.6 FL (ref 78–100)
MONOCYTES # BLD: 0.6 K/MCL (ref 0.3–0.9)
MONOCYTES NFR BLD: 7 %
NEUTROPHILS # BLD: 5.1 K/MCL (ref 1.8–7.7)
NEUTROPHILS NFR BLD: 65 %
NONHDLC SERPL-MCNC: 140 MG/DL
NRBC BLD MANUAL-RTO: 0 /100 WBC
PLATELET # BLD AUTO: 271 K/MCL (ref 140–450)
POTASSIUM SERPL-SCNC: 4.1 MMOL/L (ref 3.4–5.1)
PROT SERPL-MCNC: 7.1 G/DL (ref 6.4–8.2)
RBC # BLD: 4.72 MIL/MCL (ref 4–5.2)
SODIUM SERPL-SCNC: 137 MMOL/L (ref 135–145)
TRIGL SERPL-MCNC: 79 MG/DL
WBC # BLD: 7.8 K/MCL (ref 4.2–11)

## 2025-07-08 PROCEDURE — 36415 COLL VENOUS BLD VENIPUNCTURE: CPT | Performed by: PHYSICIAN ASSISTANT

## 2025-07-08 PROCEDURE — 80061 LIPID PANEL: CPT | Performed by: CLINICAL MEDICAL LABORATORY

## 2025-07-08 PROCEDURE — 80053 COMPREHEN METABOLIC PANEL: CPT | Performed by: CLINICAL MEDICAL LABORATORY

## 2025-07-08 PROCEDURE — 85025 COMPLETE CBC W/AUTO DIFF WBC: CPT | Performed by: CLINICAL MEDICAL LABORATORY

## 2025-07-08 PROCEDURE — 83036 HEMOGLOBIN GLYCOSYLATED A1C: CPT | Performed by: CLINICAL MEDICAL LABORATORY

## 2025-07-08 ASSESSMENT — ENCOUNTER SYMPTOMS
HEMATOCHEZIA: 0
HEMOPTYSIS: 0
DIARRHEA: 0
ABDOMINAL PAIN: 1
WHEEZING: 0
SPUTUM PRODUCTION: 0
ORTHOPNEA: 0
SORE THROAT: 0
VOMITING: 0
SHORTNESS OF BREATH: 1
BELCHING: 0
CONSTIPATION: 0
RHINORRHEA: 0
NAUSEA: 0
SWOLLEN GLANDS: 0

## 2025-07-23 ENCOUNTER — HOSPITAL ENCOUNTER (OUTPATIENT)
Dept: CT IMAGING | Age: 65
Discharge: HOME | End: 2025-07-23
Attending: INTERNAL MEDICINE
Payer: COMMERCIAL

## 2025-07-23 DIAGNOSIS — R06.09 DOE (DYSPNEA ON EXERTION): ICD-10-CM

## 2025-07-23 DIAGNOSIS — R74.8 ELEVATED LIPASE: ICD-10-CM

## 2025-07-23 DIAGNOSIS — R63.4 WEIGHT LOSS: ICD-10-CM

## 2025-07-23 LAB
PERFORMED ON: NORMAL
POC CREATININE: 0.7 MG/DL (ref 0.6–1.2)
POC SAMPLE TYPE: NORMAL

## 2025-07-23 PROCEDURE — 6360000004 HC RX CONTRAST MEDICATION: Performed by: INTERNAL MEDICINE

## 2025-07-23 PROCEDURE — 82565 ASSAY OF CREATININE: CPT

## 2025-07-23 PROCEDURE — 74177 CT ABD & PELVIS W/CONTRAST: CPT

## 2025-07-23 PROCEDURE — 75571 CT HRT W/O DYE W/CA TEST: CPT

## 2025-07-23 RX ORDER — IOPAMIDOL 612 MG/ML
50 INJECTION, SOLUTION INTRAVASCULAR
Status: COMPLETED | OUTPATIENT
Start: 2025-07-23 | End: 2025-07-23

## 2025-07-23 RX ORDER — IOPAMIDOL 755 MG/ML
75 INJECTION, SOLUTION INTRAVASCULAR
Status: COMPLETED | OUTPATIENT
Start: 2025-07-23 | End: 2025-07-23

## 2025-07-23 RX ADMIN — IOPAMIDOL 50 ML: 612 INJECTION, SOLUTION INTRAVENOUS at 13:16

## 2025-07-23 RX ADMIN — IOPAMIDOL 75 ML: 755 INJECTION, SOLUTION INTRAVENOUS at 13:16

## 2025-07-30 ENCOUNTER — TELEPHONE (OUTPATIENT)
Dept: CARDIOLOGY CLINIC | Age: 65
End: 2025-07-30

## 2025-07-30 NOTE — TELEPHONE ENCOUNTER
Dr. Kuhn,    Dr. Almonte is requesting clearance for patient to undergo EGD on 9/12/2025.    He is requesting a 2 day hold on Xarelto.      Patient was last seen in office on 3/25/2025 for management of pacemaker, atrial fibrillation and atrial fibrillation     Stress test 2023 normal   Echo 2021 :VEF 60% normal wall motion, grade 1 diastolic dysfunction.   Elevated [id number] Score 3 (HTN, AGE, GENDER)      Please advise.    Thanks,  Faby Crawford RN

## 2025-07-30 NOTE — TELEPHONE ENCOUNTER
Contreras Kuhn MD  You38 minutes ago (2:10 PM)       Ok to hold anticoagulation for 48 hours, no further work up    Thanks    Hattie

## 2025-07-30 NOTE — TELEPHONE ENCOUNTER
CARDIAC CLEARANCE     What type of procedure are you having? EGD    Which physician is performing your procedure? Dr. Almonte    When is your procedure scheduled? 09/12/2025    Where are you having this [facility]    Are you taking Blood Thinners? Yes   If so what? (Name/dose/frequesncy) Xarelto 20 mg daily     Does the surgeon want you to stop your blood thinner?  Yes If so for how long? 2 days     Are you having any new or worsening cardiac symptoms? N/A    Phone Number and Contact Name for Physicians office: [phone]    Fax number to send information: [phone]    Cardiac History:  Last office visit with Cardiologist:03/25/25    Cardiac Procedures:    Cardiac Testing:

## 2025-08-07 ENCOUNTER — APPOINTMENT (OUTPATIENT)
Dept: GASTROENTEROLOGY | Age: 65
End: 2025-08-07
Attending: INTERNAL MEDICINE

## (undated) DEVICE — #1020 STERI DRAPE 41MM X 41MM SMALL: Brand: STERI-DRAPE™

## (undated) DEVICE — ENDOSCOPY KIT: Brand: MEDLINE INDUSTRIES, INC.

## (undated) DEVICE — CORD,CAUTERY,BIPOLAR,STERILE: Brand: MEDLINE

## (undated) DEVICE — SYRINGE MED 5ML STD CLR PLAS LUERLOCK TIP N CTRL DISP

## (undated) DEVICE — SYRINGE MED 10ML TRNSLUC BRL PLUNG BLK MRK POLYPR CTRL

## (undated) DEVICE — FORCEPS BX 240CM 2.4MM L NDL RAD JAW 4 M00513334

## (undated) DEVICE — MEDICINE CUP, GRADUATED, STER: Brand: MEDLINE

## (undated) DEVICE — TUBING, SUCTION, 1/4" X 10', STRAIGHT: Brand: MEDLINE

## (undated) DEVICE — ELECTRODE PT RET AD L9FT HI MOIST COND ADH HYDRGEL CORDED

## (undated) DEVICE — FORMALIN CLEAR VIAL 20 ML 10%

## (undated) DEVICE — MASTISOL ADHESIVE LIQ 2/3ML

## (undated) DEVICE — CATHETER,URETHRAL,REDRUBBER,STRL,12FR: Brand: MEDLINE INDUSTRIES, INC.

## (undated) DEVICE — HYPODERMIC SAFETY NEEDLE: Brand: MAGELLAN

## (undated) DEVICE — STERILE COTTON BALLS LARGE 5/P: Brand: MEDLINE

## (undated) DEVICE — PACK PROCEDURE SURG EXTREMITY MFFOP CUST

## (undated) DEVICE — GLOVE ORANGE PI 7 1/2   MSG9075

## (undated) DEVICE — MARKER,SKIN,WI/RULER AND LABELS: Brand: MEDLINE

## (undated) DEVICE — TRAY PREP DRY W/ PREM GLV 2 APPL 6 SPNG 2 UNDPD 1 OVERWRAP

## (undated) DEVICE — Device

## (undated) DEVICE — SHEET,DRAPE,53X77,STERILE: Brand: MEDLINE

## (undated) DEVICE — 3M™ STERI-DRAPE™ INSTRUMENT POUCH 1018: Brand: STERI-DRAPE™

## (undated) DEVICE — GOWN SIRUS NONREIN XL W/TWL: Brand: MEDLINE INDUSTRIES, INC.

## (undated) DEVICE — TOWEL,OR,DSP,ST,BLUE,STD,4/PK,20PK/CS: Brand: MEDLINE

## (undated) DEVICE — SNARES COLD OVAL 10MM THIN

## (undated) DEVICE — BITE BLOCK ENDOSCP AD 60 FR W/ ADJ STRP PLAS GRN BLOX

## (undated) DEVICE — DRAPE ADOLESCENT  LAPAROTOMY

## (undated) DEVICE — SYRINGE MED 3ML CLR PLAS STD N CTRL LUERLOCK TIP DISP

## (undated) DEVICE — CATHETER IV 14 GA TRPL BVL LUERLOCK TIP RADIOPAQUE ANGIOCATH

## (undated) DEVICE — DRAPE,LAP,ADOLESCENT,STERILE: Brand: MEDLINE

## (undated) DEVICE — BLANKET WRM W40.2XL55.9IN IORT LO BODY + MISTRAL AIR

## (undated) DEVICE — 3 ML SYRINGE LUER-LOCK TIP: Brand: MONOJECT

## (undated) DEVICE — SYRINGE MED 10ML LUERLOCK TIP W/O SFTY DISP

## (undated) DEVICE — YANKAUER,BULB TIP,W/O VENT,RIGID,STERILE: Brand: MEDLINE

## (undated) DEVICE — SOLUTION IV IRRIG 500ML 0.9% SODIUM CHL 2F7123

## (undated) DEVICE — CYSTO/BLADDER IRRIGATION SET, REGULATING CLAMP

## (undated) DEVICE — SYRINGE, LUER LOCK, 10ML: Brand: MEDLINE

## (undated) DEVICE — BLADE CLIPPER GEN PURP NS

## (undated) DEVICE — SUTURE CHROMIC GUT SZ 4-0 L18IN ABSRB BRN L19MM PS-2 3/8 1637G

## (undated) DEVICE — GLOVE ORANGE PI 7   MSG9070

## (undated) DEVICE — BITE BLK 60FR GRN ENDOSCP AD W STRP SLD DISPOSABLE

## (undated) DEVICE — WIPE INSTR W73XL73CM VISITEC

## (undated) DEVICE — COAGULATOR SUCT 10FR L6IN HND FT SWCH VALLEYLAB

## (undated) DEVICE — SET EXTN PRIMING 4.9ML L30IN INCL SLDE CLMP SPIN M LUERLOCK

## (undated) DEVICE — SURGICAL SUCTION CONNECTING TUBE WITH MALE CONNECTOR AND SUCTION CLAMP, 2 FT. LONG (.6 M), 5 MM I.D.: Brand: CONMED

## (undated) DEVICE — PAD N ADH W3XL4IN POLY COT SFT PERF FLM EASILY CUT ABSRB

## (undated) DEVICE — ESOPHAGEAL/PYLORIC/COLONIC/BILIARY WIREGUIDED BALLOON DILATATION CATHETER: Brand: CRE™ PRO

## (undated) DEVICE — CONTAINER,SPECIMEN,OR STERILE,4OZ: Brand: MEDLINE

## (undated) DEVICE — DEVICE INFL 60ML 15ATM DISPOSABLE STERIFLATE CRE

## (undated) DEVICE — GLOVE ORANGE PI 8   MSG9080

## (undated) DEVICE — TRAP POLYP ETRAP

## (undated) DEVICE — CONTAINER SPEC 480ML CLR POLYSTYR 10% NEUT BUFF FRMLN ZN

## (undated) DEVICE — GAUZE,SPONGE,4"X4",16PLY,XRAY,STRL,LF: Brand: MEDLINE

## (undated) DEVICE — NEEDLE FLTR 19GA L1.5IN WALL THK5UM BRN POLYPR HUB S STL

## (undated) DEVICE — COVER,MAYO STAND,STERILE: Brand: MEDLINE

## (undated) DEVICE — MASC TURNOVER KIT: Brand: MEDLINE INDUSTRIES, INC.

## (undated) DEVICE — COVER,TABLE,77X90,STERILE: Brand: MEDLINE

## (undated) DEVICE — SUTURE CHROMIC GUT SZ 3-0 L27IN ABSRB BRN L19MM FS-2 3/8 636H